# Patient Record
Sex: FEMALE | Race: BLACK OR AFRICAN AMERICAN | Employment: OTHER | URBAN - METROPOLITAN AREA
[De-identification: names, ages, dates, MRNs, and addresses within clinical notes are randomized per-mention and may not be internally consistent; named-entity substitution may affect disease eponyms.]

---

## 2017-01-18 RX ORDER — FENTANYL 25 UG/H
1 PATCH TRANSDERMAL
Qty: 10 PATCH | Refills: 0 | Status: SHIPPED | OUTPATIENT
Start: 2017-01-18 | End: 2017-03-03 | Stop reason: SDUPTHER

## 2017-01-30 ENCOUNTER — HOSPITAL ENCOUNTER (OUTPATIENT)
Dept: ENDOSCOPY | Age: 82
Discharge: HOME | End: 2017-01-30
Attending: INTERNAL MEDICINE | Admitting: INTERNAL MEDICINE

## 2017-01-30 VITALS
RESPIRATION RATE: 18 BRPM | SYSTOLIC BLOOD PRESSURE: 171 MMHG | OXYGEN SATURATION: 98 % | TEMPERATURE: 97.3 F | HEART RATE: 99 BPM | DIASTOLIC BLOOD PRESSURE: 63 MMHG | HEIGHT: 66 IN

## 2017-01-30 LAB
GLUCOSE BLD-MCNC: 71 MG/DL (ref 70–99)
PERFORMED ON: NORMAL

## 2017-01-30 RX ORDER — MULTIVIT WITH MINERALS/LUTEIN
500 TABLET ORAL DAILY
COMMUNITY

## 2017-01-30 RX ORDER — UBIDECARENONE 75 MG
50 CAPSULE ORAL DAILY
COMMUNITY

## 2017-01-30 RX ORDER — ACETAMINOPHEN 160 MG
1000 TABLET,DISINTEGRATING ORAL DAILY
COMMUNITY

## 2017-01-30 RX ORDER — OMEPRAZOLE 20 MG/1
20 CAPSULE, DELAYED RELEASE ORAL DAILY
Status: ON HOLD | COMMUNITY
End: 2020-07-14 | Stop reason: HOSPADM

## 2017-03-03 RX ORDER — FENTANYL 25 UG/H
1 PATCH TRANSDERMAL
Qty: 10 PATCH | Refills: 0 | Status: SHIPPED | OUTPATIENT
Start: 2017-03-03 | End: 2017-04-11 | Stop reason: SDUPTHER

## 2017-04-11 RX ORDER — FENTANYL 25 UG/H
1 PATCH TRANSDERMAL
Qty: 10 PATCH | Refills: 0 | Status: SHIPPED | OUTPATIENT
Start: 2017-04-11 | End: 2017-05-18 | Stop reason: SDUPTHER

## 2017-05-19 RX ORDER — FENTANYL 25 UG/H
1 PATCH TRANSDERMAL
Qty: 10 PATCH | Refills: 0 | Status: SHIPPED | OUTPATIENT
Start: 2017-05-19 | End: 2017-06-18

## 2017-06-29 ENCOUNTER — TELEPHONE (OUTPATIENT)
Dept: ORTHOPEDIC SURGERY | Age: 82
End: 2017-06-29

## 2017-07-11 ENCOUNTER — OFFICE VISIT (OUTPATIENT)
Dept: ORTHOPEDIC SURGERY | Age: 82
End: 2017-07-11

## 2017-07-11 VITALS — BODY MASS INDEX: 25.39 KG/M2 | TEMPERATURE: 97.9 F | WEIGHT: 158 LBS | HEIGHT: 66 IN

## 2017-07-11 DIAGNOSIS — Z96.651 STATUS POST TOTAL RIGHT KNEE REPLACEMENT: ICD-10-CM

## 2017-07-11 DIAGNOSIS — S82.001A CLOSED DISPLACED FRACTURE OF RIGHT PATELLA, UNSPECIFIED FRACTURE MORPHOLOGY, INITIAL ENCOUNTER: Primary | ICD-10-CM

## 2017-07-11 PROCEDURE — 99202 OFFICE O/P NEW SF 15 MIN: CPT | Performed by: PHYSICIAN ASSISTANT

## 2017-07-26 ENCOUNTER — OFFICE VISIT (OUTPATIENT)
Dept: ORTHOPEDIC SURGERY | Age: 82
End: 2017-07-26

## 2017-07-26 VITALS — BODY MASS INDEX: 25.39 KG/M2 | WEIGHT: 158 LBS | HEIGHT: 66 IN

## 2017-07-26 DIAGNOSIS — S82.001A CLOSED DISPLACED FRACTURE OF RIGHT PATELLA, UNSPECIFIED FRACTURE MORPHOLOGY, INITIAL ENCOUNTER: Primary | ICD-10-CM

## 2017-07-26 PROCEDURE — 99212 OFFICE O/P EST SF 10 MIN: CPT | Performed by: PHYSICIAN ASSISTANT

## 2017-07-27 ENCOUNTER — TELEPHONE (OUTPATIENT)
Dept: ORTHOPEDIC SURGERY | Age: 82
End: 2017-07-27

## 2017-08-01 RX ORDER — FENTANYL 25 UG/H
1 PATCH TRANSDERMAL
Qty: 10 PATCH | Refills: 0 | Status: SHIPPED | OUTPATIENT
Start: 2017-08-01 | End: 2017-10-20 | Stop reason: SDUPTHER

## 2017-08-15 ENCOUNTER — OFFICE VISIT (OUTPATIENT)
Dept: INTERNAL MEDICINE CLINIC | Age: 82
End: 2017-08-15

## 2017-08-15 DIAGNOSIS — F03.90 DEMENTIA WITHOUT BEHAVIORAL DISTURBANCE, UNSPECIFIED DEMENTIA TYPE: ICD-10-CM

## 2017-08-15 DIAGNOSIS — K92.2 GASTROINTESTINAL HEMORRHAGE, UNSPECIFIED GASTROINTESTINAL HEMORRHAGE TYPE: Primary | ICD-10-CM

## 2017-08-15 DIAGNOSIS — E11.9 TYPE 2 DIABETES MELLITUS WITHOUT COMPLICATION, WITHOUT LONG-TERM CURRENT USE OF INSULIN (HCC): ICD-10-CM

## 2017-08-15 DIAGNOSIS — I10 ESSENTIAL HYPERTENSION: ICD-10-CM

## 2017-08-15 PROCEDURE — 99309 SBSQ NF CARE MODERATE MDM 30: CPT | Performed by: INTERNAL MEDICINE

## 2017-08-21 ASSESSMENT — ENCOUNTER SYMPTOMS
RESPIRATORY NEGATIVE: 1
EYES NEGATIVE: 1
ALLERGIC/IMMUNOLOGIC NEGATIVE: 1
GASTROINTESTINAL NEGATIVE: 1

## 2017-10-02 ENCOUNTER — TELEPHONE (OUTPATIENT)
Dept: ORTHOPEDIC SURGERY | Age: 82
End: 2017-10-02

## 2017-10-02 NOTE — TELEPHONE ENCOUNTER
Chinmay is calling (nurse) to see if patient can be discharge from the brace.   Please call Chinmay at [phone]

## 2017-10-10 ENCOUNTER — OFFICE VISIT (OUTPATIENT)
Dept: INTERNAL MEDICINE CLINIC | Age: 82
End: 2017-10-10

## 2017-10-10 DIAGNOSIS — K92.2 GASTROINTESTINAL HEMORRHAGE, UNSPECIFIED GASTROINTESTINAL HEMORRHAGE TYPE: Primary | ICD-10-CM

## 2017-10-10 DIAGNOSIS — F03.90 DEMENTIA WITHOUT BEHAVIORAL DISTURBANCE, UNSPECIFIED DEMENTIA TYPE: ICD-10-CM

## 2017-10-10 DIAGNOSIS — E11.9 TYPE 2 DIABETES MELLITUS WITHOUT COMPLICATION, WITHOUT LONG-TERM CURRENT USE OF INSULIN (HCC): ICD-10-CM

## 2017-10-10 DIAGNOSIS — I10 ESSENTIAL HYPERTENSION: ICD-10-CM

## 2017-10-10 PROCEDURE — 99309 SBSQ NF CARE MODERATE MDM 30: CPT | Performed by: INTERNAL MEDICINE

## 2017-10-20 RX ORDER — FENTANYL 25 UG/H
1 PATCH TRANSDERMAL
Qty: 10 PATCH | Refills: 0 | Status: SHIPPED | OUTPATIENT
Start: 2017-10-20 | End: 2017-11-01 | Stop reason: SDUPTHER

## 2017-10-23 ASSESSMENT — ENCOUNTER SYMPTOMS
EYES NEGATIVE: 1
RESPIRATORY NEGATIVE: 1
ALLERGIC/IMMUNOLOGIC NEGATIVE: 1
GASTROINTESTINAL NEGATIVE: 1

## 2017-10-23 NOTE — PROGRESS NOTES
supple. Cardiovascular: Normal rate, regular rhythm, normal heart sounds and intact distal pulses. Pulmonary/Chest: Effort normal and breath sounds normal.   Abdominal: Soft. Bowel sounds are normal.   Musculoskeletal: Normal range of motion. Neurological: She is alert and oriented to person, place, and time. Skin: Skin is warm and dry. Psychiatric: She has a normal mood and affect. Assessment:            Plan:      Marc Barrera was seen today for 3 month follow-up. Diagnoses and all orders for this visit:    Gastrointestinal hemorrhage, unspecified gastrointestinal hemorrhage type - Resolved    Essential hypertension - Controlled well with current medications    Dementia without behavioral disturbance, unspecified dementia type - Stable    Type 2 diabetes mellitus without complication, without long-term current use of insulin ([address] - Controlled well with current medical regimen.

## 2017-10-30 ENCOUNTER — HOSPITAL ENCOUNTER (OUTPATIENT)
Dept: CT IMAGING | Age: 82
Discharge: HOME | End: 2017-10-30
Attending: INTERNAL MEDICINE | Admitting: INTERNAL MEDICINE

## 2017-10-30 DIAGNOSIS — R26.2 DIFFICULTY IN WALKING, NOT ELSEWHERE CLASSIFIED: ICD-10-CM

## 2017-10-30 DIAGNOSIS — R26.2 DIFFICULTY WALKING: ICD-10-CM

## 2017-11-01 RX ORDER — FENTANYL 25 UG/H
1 PATCH TRANSDERMAL
Qty: 10 PATCH | Refills: 0 | Status: SHIPPED | OUTPATIENT
Start: 2017-11-01

## 2018-02-28 ENCOUNTER — HOSPITAL ENCOUNTER (OUTPATIENT)
Dept: ENDOSCOPY | Age: 83
Discharge: HOME | End: 2018-02-28
Attending: INTERNAL MEDICINE | Admitting: INTERNAL MEDICINE

## 2018-02-28 VITALS
HEIGHT: 66 IN | HEART RATE: 60 BPM | DIASTOLIC BLOOD PRESSURE: 60 MMHG | RESPIRATION RATE: 16 BRPM | OXYGEN SATURATION: 100 % | TEMPERATURE: 96.3 F | WEIGHT: 163 LBS | SYSTOLIC BLOOD PRESSURE: 155 MMHG | BODY MASS INDEX: 26.2 KG/M2

## 2018-02-28 LAB
GLUCOSE BLD-MCNC: 87 MG/DL (ref 70–99)
PERFORMED ON: NORMAL

## 2018-02-28 ASSESSMENT — PAIN - FUNCTIONAL ASSESSMENT: PAIN_FUNCTIONAL_ASSESSMENT: ADVANCED DEMENTIA

## 2018-02-28 NOTE — BRIEF OP NOTE
Brief Postoperative Note    Shahriar Russell  YOB: 1933  [id number]    Pre-operative Diagnosis:  polyp    Post-operative Diagnosis: Same    Procedure: colon bx    Anesthesia: Moderate Sedation    Surgeons/Assistants: yenny    Estimated Blood Loss: less than 50     Complications: None    Specimens: Was Obtained:     Findings: polyp    Electronically signed by Radu Barraza MD on 2/28/2018 at 11:35 AM

## 2018-02-28 NOTE — PROGRESS NOTES
.History and Physical / Pre-Sedation Assessment    Patient:  Anand Bangura   :   1933     Intended Procedure:  colon  HPI: polyp    Nurses notes reviewed and agreed. Medications reviewed  Allergies: No Known Allergies    Physical Exam:  Vital Signs: BP (!) 155/60   Pulse 60   Temp 96.3 °F (35.7 °C) (Axillary)   Resp 16   Ht 5' 6\" (1.676 m)   Wt 163 lb (73.9 kg)   SpO2 100%   BMI 26.31 kg/m²    Airway: Mallampati: I (soft palate, uvula, fauces, tonsillar pillars visible)  Pulmonary:Normal  Cardiac:Normal  Abdomen:Normal    Pre-Procedure Assessment / Plan:  ASA: Class 2 - A normal healthy patient with mild systemic disease  Level of Sedation Plan: Moderate sedation  Post Procedure plan: Return to same level of care    I assessed the patient and find that the patient is in satisfactory condition to proceed with the planned procedure and sedation plan. I have explained the risk, benefits, and alternatives to the procedure; the patient understands and agrees to proceed.        Josie Steele  2018

## 2018-02-28 NOTE — PLAN OF CARE
ADMISSION PRE-PROCEDURE INTRA-PROCEDURE POST-PROCEDURE: RECOVERY/ DISCHARGE   ASSESSMENT &  EVALUATION CONSULTS [x] Verify patient identification, allergies, vital signs, NPO, IV, & SPO2  [x] Complete the EMILY SCORE  [x] Consent form to treat signed  [x] History and Physical [x] Reassess patient after pre- procedure medication given  [x] GI physician evaluates pt  [x] Verify patient's name, allergies [x] Continuous monitoring of vital  signs, SPO2, LOC  [x] Emotional status  [x] Patient comfort level [x] Total system admission assessment with appropriate intervention  [x] Pain evaluation and management  [x] Discharge criteria met  [x] Discharge assessment with appropriate intervention  [x] Compare with pre-procedure status  [x] Discharge by appropriate physician   DIAGNOSTIC / TESTS [x]  Lab work ordered  [x]  Obtain and attach lab work to patient's chart  [x]  Report abnormals and F/U with physician [x] Assure needed test results are present [x] Diagnostic testing as indicated  [x] Obtained specimens sent to lab [x] Diagnostic testing as indicated     MEDICATIONS [x]  Conscious sedation medications  explained to patient  [x]  Start IV per physician's order  and/or protocol  [x]  Verify compliance of the colon prep  []  Pre-procedure med. as ordered  [x] Verify compliance of colon prep. [x] Re-check IV access [x] Assist with administration of IV conscious sedation medication  [x] Start O2  per  nasal cannula, if needed   [x] IV fluids as indicated/ordered  [x] Administration of medications as ordered  [x] Medications as prescribed  [x] D/C O2 therapy as ordered   PROCEDURE/TREATMENT [x] Specific order by GI physician  [x] Specific procedure as scheduled  [x]  Verify procedure as ordered [x] Time out/procedure verification checklist complete.  [x] EGD/Colonoscopy and related procedures  [x] Assist physician with the procedure [x] Treatment as indicated   NUTRITION / DIET [x] NPO after midnight, as ordered [x] orientation   -  tolerate fluid with no nausea and vomiting  -  ambulate as pre-procedure ADL   - verbalize understanding of the discharge instructions     Amanda Patient  11:58 AM

## 2018-03-01 NOTE — PROCEDURES
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       Monica Jacobsen MD    D: 02/28/2018 12:22:03       T: 02/28/2018 14:12:24     [email or web address]  Job#: [id number]     Doc#: [id number]    CC:  Adela Palmer MD

## 2018-07-13 PROBLEM — K59.00 CONSTIPATION: Status: ACTIVE | Noted: 2018-07-13

## 2020-07-05 ENCOUNTER — HOSPITAL ENCOUNTER (INPATIENT)
Age: 85
LOS: 10 days | Discharge: OTHER INSTITUTION | DRG: 871 | End: 2020-07-15
Attending: EMERGENCY MEDICINE | Admitting: INTERNAL MEDICINE
Payer: MEDICARE

## 2020-07-05 ENCOUNTER — APPOINTMENT (OUTPATIENT)
Dept: GENERAL RADIOLOGY | Age: 85
DRG: 871 | End: 2020-07-05
Payer: MEDICARE

## 2020-07-05 PROBLEM — N17.9 ACUTE KIDNEY INJURY (HCC): Status: ACTIVE | Noted: 2020-07-05

## 2020-07-05 LAB
A/G RATIO: 0.7 (ref 1.1–2.2)
ALBUMIN SERPL-MCNC: 3.8 G/DL (ref 3.4–5)
ALP BLD-CCNC: 89 U/L (ref 40–129)
ALT SERPL-CCNC: 18 U/L (ref 10–40)
AMMONIA: 28 UMOL/L (ref 11–51)
ANION GAP SERPL CALCULATED.3IONS-SCNC: 26 MMOL/L (ref 3–16)
AST SERPL-CCNC: 52 U/L (ref 15–37)
BASE EXCESS VENOUS: -8.3 MMOL/L (ref -3–3)
BASOPHILS ABSOLUTE: 0.1 K/UL (ref 0–0.2)
BASOPHILS RELATIVE PERCENT: 0.5 %
BETA-HYDROXYBUTYRATE: 2 MMOL/L (ref 0–0.27)
BILIRUB SERPL-MCNC: 0.3 MG/DL (ref 0–1)
BUN BLDV-MCNC: 190 MG/DL (ref 7–20)
CALCIUM SERPL-MCNC: 10.6 MG/DL (ref 8.3–10.6)
CARBOXYHEMOGLOBIN: 3.1 % (ref 0–1.5)
CHLORIDE BLD-SCNC: 107 MMOL/L (ref 99–110)
CO2: 17 MMOL/L (ref 21–32)
CREAT SERPL-MCNC: 5 MG/DL (ref 0.6–1.2)
EOSINOPHILS ABSOLUTE: 0 K/UL (ref 0–0.6)
EOSINOPHILS RELATIVE PERCENT: 0 %
GFR AFRICAN AMERICAN: 10
GFR NON-AFRICAN AMERICAN: 8
GLOBULIN: 5.7 G/DL
GLUCOSE BLD-MCNC: 274 MG/DL (ref 70–99)
HCO3 VENOUS: 16.9 MMOL/L (ref 23–29)
HCT VFR BLD CALC: 38.9 % (ref 36–48)
HEMOGLOBIN: 12 G/DL (ref 12–16)
INR BLD: 1.14 (ref 0.86–1.14)
LACTATE DEHYDROGENASE: 552 U/L (ref 100–190)
LACTIC ACID, SEPSIS: 3.3 MMOL/L (ref 0.4–1.9)
LYMPHOCYTES ABSOLUTE: 0.6 K/UL (ref 1–5.1)
LYMPHOCYTES RELATIVE PERCENT: 2.3 %
MCH RBC QN AUTO: 28.6 PG (ref 26–34)
MCHC RBC AUTO-ENTMCNC: 30.9 G/DL (ref 31–36)
MCV RBC AUTO: 92.6 FL (ref 80–100)
METHEMOGLOBIN VENOUS: <0 %
MONOCYTES ABSOLUTE: 0.5 K/UL (ref 0–1.3)
MONOCYTES RELATIVE PERCENT: 2.1 %
NEUTROPHILS ABSOLUTE: 24.8 K/UL (ref 1.7–7.7)
NEUTROPHILS RELATIVE PERCENT: 95.1 %
O2 CONTENT, VEN: 17 VOL %
O2 SAT, VEN: 98 %
O2 THERAPY: ABNORMAL
PCO2, VEN: 33.2 MMHG (ref 40–50)
PDW BLD-RTO: 17.5 % (ref 12.4–15.4)
PH VENOUS: 7.32 (ref 7.35–7.45)
PLATELET # BLD: 407 K/UL (ref 135–450)
PMV BLD AUTO: 9.8 FL (ref 5–10.5)
PO2, VEN: 104 MMHG (ref 25–40)
POTASSIUM REFLEX MAGNESIUM: 5.4 MMOL/L (ref 3.5–5.1)
PRO-BNP: 5257 PG/ML (ref 0–449)
PROCALCITONIN: 0.85 NG/ML (ref 0–0.15)
PROTHROMBIN TIME: 13.3 SEC (ref 10–13.2)
RBC # BLD: 4.21 M/UL (ref 4–5.2)
SALICYLATE, SERUM: <0.3 MG/DL (ref 15–30)
SODIUM BLD-SCNC: 150 MMOL/L (ref 136–145)
TCO2 CALC VENOUS: 40 MMOL/L
TOTAL PROTEIN: 9.5 G/DL (ref 6.4–8.2)
TROPONIN: 0.34 NG/ML
WBC # BLD: 26.1 K/UL (ref 4–11)

## 2020-07-05 PROCEDURE — 83930 ASSAY OF BLOOD OSMOLALITY: CPT

## 2020-07-05 PROCEDURE — G0480 DRUG TEST DEF 1-7 CLASSES: HCPCS

## 2020-07-05 PROCEDURE — 87040 BLOOD CULTURE FOR BACTERIA: CPT

## 2020-07-05 PROCEDURE — 85025 COMPLETE CBC W/AUTO DIFF WBC: CPT

## 2020-07-05 PROCEDURE — 96374 THER/PROPH/DIAG INJ IV PUSH: CPT

## 2020-07-05 PROCEDURE — 93005 ELECTROCARDIOGRAM TRACING: CPT | Performed by: EMERGENCY MEDICINE

## 2020-07-05 PROCEDURE — 82010 KETONE BODYS QUAN: CPT

## 2020-07-05 PROCEDURE — 83605 ASSAY OF LACTIC ACID: CPT

## 2020-07-05 PROCEDURE — 83615 LACTATE (LD) (LDH) ENZYME: CPT

## 2020-07-05 PROCEDURE — 82140 ASSAY OF AMMONIA: CPT

## 2020-07-05 PROCEDURE — 71045 X-RAY EXAM CHEST 1 VIEW: CPT

## 2020-07-05 PROCEDURE — 84145 PROCALCITONIN (PCT): CPT

## 2020-07-05 PROCEDURE — 36415 COLL VENOUS BLD VENIPUNCTURE: CPT

## 2020-07-05 PROCEDURE — 85610 PROTHROMBIN TIME: CPT

## 2020-07-05 PROCEDURE — 6360000002 HC RX W HCPCS

## 2020-07-05 PROCEDURE — 0DB78ZX EXCISION OF STOMACH, PYLORUS, VIA NATURAL OR ARTIFICIAL OPENING ENDOSCOPIC, DIAGNOSTIC: ICD-10-PCS | Performed by: INTERNAL MEDICINE

## 2020-07-05 PROCEDURE — 99285 EMERGENCY DEPT VISIT HI MDM: CPT

## 2020-07-05 PROCEDURE — 2060000000 HC ICU INTERMEDIATE R&B

## 2020-07-05 PROCEDURE — 0DB68ZX EXCISION OF STOMACH, VIA NATURAL OR ARTIFICIAL OPENING ENDOSCOPIC, DIAGNOSTIC: ICD-10-PCS | Performed by: INTERNAL MEDICINE

## 2020-07-05 PROCEDURE — U0003 INFECTIOUS AGENT DETECTION BY NUCLEIC ACID (DNA OR RNA); SEVERE ACUTE RESPIRATORY SYNDROME CORONAVIRUS 2 (SARS-COV-2) (CORONAVIRUS DISEASE [COVID-19]), AMPLIFIED PROBE TECHNIQUE, MAKING USE OF HIGH THROUGHPUT TECHNOLOGIES AS DESCRIBED BY CMS-2020-01-R: HCPCS

## 2020-07-05 PROCEDURE — 80053 COMPREHEN METABOLIC PANEL: CPT

## 2020-07-05 PROCEDURE — 83880 ASSAY OF NATRIURETIC PEPTIDE: CPT

## 2020-07-05 PROCEDURE — 82803 BLOOD GASES ANY COMBINATION: CPT

## 2020-07-05 PROCEDURE — 84484 ASSAY OF TROPONIN QUANT: CPT

## 2020-07-05 RX ORDER — NALOXONE HYDROCHLORIDE 1 MG/ML
INJECTION INTRAMUSCULAR; INTRAVENOUS; SUBCUTANEOUS
Status: COMPLETED
Start: 2020-07-05 | End: 2020-07-05

## 2020-07-05 RX ORDER — SODIUM CHLORIDE 450 MG/100ML
INJECTION, SOLUTION INTRAVENOUS CONTINUOUS
Status: DISCONTINUED | OUTPATIENT
Start: 2020-07-05 | End: 2020-07-06

## 2020-07-05 RX ORDER — 0.9 % SODIUM CHLORIDE 0.9 %
1000 INTRAVENOUS SOLUTION INTRAVENOUS ONCE
Status: COMPLETED | OUTPATIENT
Start: 2020-07-05 | End: 2020-07-06

## 2020-07-05 RX ORDER — ASPIRIN 81 MG/1
324 TABLET, CHEWABLE ORAL ONCE
Status: DISCONTINUED | OUTPATIENT
Start: 2020-07-05 | End: 2020-07-15 | Stop reason: HOSPADM

## 2020-07-05 RX ORDER — NALOXONE HYDROCHLORIDE 1 MG/ML
2 INJECTION INTRAMUSCULAR; INTRAVENOUS; SUBCUTANEOUS ONCE
Status: COMPLETED | OUTPATIENT
Start: 2020-07-05 | End: 2020-07-05

## 2020-07-05 RX ORDER — LINEZOLID 2 MG/ML
600 INJECTION, SOLUTION INTRAVENOUS ONCE
Status: COMPLETED | OUTPATIENT
Start: 2020-07-05 | End: 2020-07-06

## 2020-07-05 RX ADMIN — NALOXONE HYDROCHLORIDE 2 MG: 1 INJECTION PARENTERAL at 21:35

## 2020-07-05 RX ADMIN — NALOXONE HYDROCHLORIDE 2 MG: 1 INJECTION INTRAMUSCULAR; INTRAVENOUS; SUBCUTANEOUS at 21:35

## 2020-07-06 PROBLEM — E10.10 DKA, TYPE 1, NOT AT GOAL (HCC): Status: ACTIVE | Noted: 2020-07-06

## 2020-07-06 LAB
A/G RATIO: 0.7 (ref 1.1–2.2)
ALBUMIN SERPL-MCNC: 3 G/DL (ref 3.4–5)
ALP BLD-CCNC: 122 U/L (ref 40–129)
ALT SERPL-CCNC: 18 U/L (ref 10–40)
ANION GAP SERPL CALCULATED.3IONS-SCNC: 21 MMOL/L (ref 3–16)
ANION GAP SERPL CALCULATED.3IONS-SCNC: 22 MMOL/L (ref 3–16)
ANION GAP SERPL CALCULATED.3IONS-SCNC: 25 MMOL/L (ref 3–16)
ANION GAP SERPL CALCULATED.3IONS-SCNC: 26 MMOL/L (ref 3–16)
ANION GAP SERPL CALCULATED.3IONS-SCNC: 30 MMOL/L (ref 3–16)
AST SERPL-CCNC: 46 U/L (ref 15–37)
BASOPHILS ABSOLUTE: 0.1 K/UL (ref 0–0.2)
BASOPHILS RELATIVE PERCENT: 0.3 %
BETA-HYDROXYBUTYRATE: 1 MMOL/L (ref 0–0.27)
BILIRUB SERPL-MCNC: 0.4 MG/DL (ref 0–1)
BILIRUBIN URINE: NEGATIVE
BLOOD, URINE: ABNORMAL
BUN BLDV-MCNC: 162 MG/DL (ref 7–20)
BUN BLDV-MCNC: 173 MG/DL (ref 7–20)
BUN BLDV-MCNC: 186 MG/DL (ref 7–20)
BUN BLDV-MCNC: 188 MG/DL (ref 7–20)
BUN BLDV-MCNC: 200 MG/DL (ref 7–20)
CALCIUM SERPL-MCNC: 7.9 MG/DL (ref 8.3–10.6)
CALCIUM SERPL-MCNC: 9.3 MG/DL (ref 8.3–10.6)
CALCIUM SERPL-MCNC: 9.3 MG/DL (ref 8.3–10.6)
CALCIUM SERPL-MCNC: 9.6 MG/DL (ref 8.3–10.6)
CALCIUM SERPL-MCNC: 9.6 MG/DL (ref 8.3–10.6)
CHLORIDE BLD-SCNC: 100 MMOL/L (ref 99–110)
CHLORIDE BLD-SCNC: 112 MMOL/L (ref 99–110)
CHLORIDE BLD-SCNC: 113 MMOL/L (ref 99–110)
CHLORIDE BLD-SCNC: 114 MMOL/L (ref 99–110)
CHLORIDE BLD-SCNC: 115 MMOL/L (ref 99–110)
CHOLESTEROL, TOTAL: 113 MG/DL (ref 0–199)
CLARITY: ABNORMAL
CO2: 13 MMOL/L (ref 21–32)
CO2: 15 MMOL/L (ref 21–32)
CO2: 16 MMOL/L (ref 21–32)
CO2: 16 MMOL/L (ref 21–32)
CO2: 18 MMOL/L (ref 21–32)
COLOR: YELLOW
CREAT SERPL-MCNC: 4.3 MG/DL (ref 0.6–1.2)
CREAT SERPL-MCNC: 4.5 MG/DL (ref 0.6–1.2)
CREAT SERPL-MCNC: 4.8 MG/DL (ref 0.6–1.2)
CREAT SERPL-MCNC: 5 MG/DL (ref 0.6–1.2)
CREAT SERPL-MCNC: 5 MG/DL (ref 0.6–1.2)
CREATININE URINE: 53.7 MG/DL (ref 28–259)
EKG ATRIAL RATE: 126 BPM
EKG DIAGNOSIS: NORMAL
EKG P AXIS: -9 DEGREES
EKG P-R INTERVAL: 134 MS
EKG Q-T INTERVAL: 340 MS
EKG QRS DURATION: 104 MS
EKG QTC CALCULATION (BAZETT): 492 MS
EKG R AXIS: 8 DEGREES
EKG T AXIS: 168 DEGREES
EKG VENTRICULAR RATE: 126 BPM
EOSINOPHILS ABSOLUTE: 0 K/UL (ref 0–0.6)
EOSINOPHILS RELATIVE PERCENT: 0 %
ESTIMATED AVERAGE GLUCOSE: 157.1 MG/DL
GFR AFRICAN AMERICAN: 10
GFR AFRICAN AMERICAN: 11
GFR AFRICAN AMERICAN: 12
GFR NON-AFRICAN AMERICAN: 10
GFR NON-AFRICAN AMERICAN: 8
GFR NON-AFRICAN AMERICAN: 8
GFR NON-AFRICAN AMERICAN: 9
GFR NON-AFRICAN AMERICAN: 9
GLOBULIN: 4.5 G/DL
GLUCOSE BLD-MCNC: 161 MG/DL (ref 70–99)
GLUCOSE BLD-MCNC: 187 MG/DL (ref 70–99)
GLUCOSE BLD-MCNC: 195 MG/DL (ref 70–99)
GLUCOSE BLD-MCNC: 202 MG/DL (ref 70–99)
GLUCOSE BLD-MCNC: 206 MG/DL (ref 70–99)
GLUCOSE BLD-MCNC: 208 MG/DL (ref 70–99)
GLUCOSE BLD-MCNC: 210 MG/DL (ref 70–99)
GLUCOSE BLD-MCNC: 212 MG/DL (ref 70–99)
GLUCOSE BLD-MCNC: 216 MG/DL (ref 70–99)
GLUCOSE BLD-MCNC: 218 MG/DL (ref 70–99)
GLUCOSE BLD-MCNC: 348 MG/DL (ref 70–99)
GLUCOSE BLD-MCNC: 384 MG/DL (ref 70–99)
GLUCOSE BLD-MCNC: 404 MG/DL (ref 70–99)
GLUCOSE BLD-MCNC: 427 MG/DL (ref 70–99)
GLUCOSE BLD-MCNC: 626 MG/DL (ref 70–99)
GLUCOSE URINE: NEGATIVE MG/DL
HBA1C MFR BLD: 7.1 %
HCT VFR BLD CALC: 37.2 % (ref 36–48)
HCT VFR BLD CALC: 39.5 % (ref 36–48)
HDLC SERPL-MCNC: 20 MG/DL (ref 40–60)
HEMOGLOBIN: 11.5 G/DL (ref 12–16)
HEMOGLOBIN: 11.5 G/DL (ref 12–16)
KETONES, URINE: NEGATIVE MG/DL
LACTIC ACID, SEPSIS: 3 MMOL/L (ref 0.4–1.9)
LACTIC ACID: 2.3 MMOL/L (ref 0.4–2)
LACTIC ACID: 3 MMOL/L (ref 0.4–2)
LACTIC ACID: 3 MMOL/L (ref 0.4–2)
LDL CHOLESTEROL CALCULATED: 49 MG/DL
LEUKOCYTE ESTERASE, URINE: ABNORMAL
LYMPHOCYTES ABSOLUTE: 0.7 K/UL (ref 1–5.1)
LYMPHOCYTES RELATIVE PERCENT: 2.4 %
MAGNESIUM: 2.4 MG/DL (ref 1.8–2.4)
MAGNESIUM: 2.4 MG/DL (ref 1.8–2.4)
MAGNESIUM: 2.6 MG/DL (ref 1.8–2.4)
MCH RBC QN AUTO: 29.2 PG (ref 26–34)
MCH RBC QN AUTO: 29.6 PG (ref 26–34)
MCHC RBC AUTO-ENTMCNC: 29.3 G/DL (ref 31–36)
MCHC RBC AUTO-ENTMCNC: 30.8 G/DL (ref 31–36)
MCV RBC AUTO: 96.1 FL (ref 80–100)
MCV RBC AUTO: 99.8 FL (ref 80–100)
MICROSCOPIC EXAMINATION: YES
MONOCYTES ABSOLUTE: 0.9 K/UL (ref 0–1.3)
MONOCYTES RELATIVE PERCENT: 3 %
NEUTROPHILS ABSOLUTE: 28.9 K/UL (ref 1.7–7.7)
NEUTROPHILS RELATIVE PERCENT: 94.3 %
NITRITE, URINE: NEGATIVE
OCCULT BLOOD DIAGNOSTIC: ABNORMAL
OSMOLALITY URINE: 440 MOSM/KG (ref 390–1070)
OSMOLALITY: 404 MOSM/KG (ref 280–301)
PDW BLD-RTO: 17.6 % (ref 12.4–15.4)
PDW BLD-RTO: 18.2 % (ref 12.4–15.4)
PERFORMED ON: ABNORMAL
PH UA: 5.5 (ref 5–8)
PHOSPHORUS: 5.1 MG/DL (ref 2.5–4.9)
PHOSPHORUS: 5.7 MG/DL (ref 2.5–4.9)
PHOSPHORUS: 6.4 MG/DL (ref 2.5–4.9)
PLATELET # BLD: 273 K/UL (ref 135–450)
PLATELET # BLD: 278 K/UL (ref 135–450)
PMV BLD AUTO: 10.3 FL (ref 5–10.5)
PMV BLD AUTO: 9.6 FL (ref 5–10.5)
POTASSIUM SERPL-SCNC: 2.9 MMOL/L (ref 3.5–5.1)
POTASSIUM SERPL-SCNC: 4.1 MMOL/L (ref 3.5–5.1)
POTASSIUM SERPL-SCNC: 4.1 MMOL/L (ref 3.5–5.1)
POTASSIUM SERPL-SCNC: 4.4 MMOL/L (ref 3.5–5.1)
POTASSIUM SERPL-SCNC: 4.8 MMOL/L (ref 3.5–5.1)
PROTEIN UA: 30 MG/DL
RBC # BLD: 3.87 M/UL (ref 4–5.2)
RBC # BLD: 3.96 M/UL (ref 4–5.2)
RBC UA: ABNORMAL /HPF (ref 0–4)
SARS-COV-2, PCR: NOT DETECTED
SODIUM BLD-SCNC: 143 MMOL/L (ref 136–145)
SODIUM BLD-SCNC: 152 MMOL/L (ref 136–145)
SODIUM BLD-SCNC: 153 MMOL/L (ref 136–145)
SODIUM BLD-SCNC: 154 MMOL/L (ref 136–145)
SODIUM BLD-SCNC: 154 MMOL/L (ref 136–145)
SODIUM URINE: 34 MMOL/L
SPECIFIC GRAVITY UA: 1.01 (ref 1–1.03)
TOTAL CK: 1552 U/L (ref 26–192)
TOTAL PROTEIN: 7.5 G/DL (ref 6.4–8.2)
TRIGL SERPL-MCNC: 218 MG/DL (ref 0–150)
TROPONIN: 0.22 NG/ML
TROPONIN: 0.22 NG/ML
TROPONIN: 0.36 NG/ML
URINE REFLEX TO CULTURE: YES
URINE TYPE: ABNORMAL
UROBILINOGEN, URINE: 0.2 E.U./DL
VLDLC SERPL CALC-MCNC: 44 MG/DL
WBC # BLD: 30.6 K/UL (ref 4–11)
WBC # BLD: 34.5 K/UL (ref 4–11)
WBC UA: >100 /HPF (ref 0–5)

## 2020-07-06 PROCEDURE — 2580000003 HC RX 258: Performed by: HOSPITALIST

## 2020-07-06 PROCEDURE — 87081 CULTURE SCREEN ONLY: CPT

## 2020-07-06 PROCEDURE — 83036 HEMOGLOBIN GLYCOSYLATED A1C: CPT

## 2020-07-06 PROCEDURE — 36569 INSJ PICC 5 YR+ W/O IMAGING: CPT

## 2020-07-06 PROCEDURE — 87186 SC STD MICRODIL/AGAR DIL: CPT

## 2020-07-06 PROCEDURE — 6360000002 HC RX W HCPCS: Performed by: INTERNAL MEDICINE

## 2020-07-06 PROCEDURE — 6370000000 HC RX 637 (ALT 250 FOR IP): Performed by: INTERNAL MEDICINE

## 2020-07-06 PROCEDURE — 36415 COLL VENOUS BLD VENIPUNCTURE: CPT

## 2020-07-06 PROCEDURE — 80061 LIPID PANEL: CPT

## 2020-07-06 PROCEDURE — 99291 CRITICAL CARE FIRST HOUR: CPT | Performed by: INTERNAL MEDICINE

## 2020-07-06 PROCEDURE — 83935 ASSAY OF URINE OSMOLALITY: CPT

## 2020-07-06 PROCEDURE — 2580000003 HC RX 258: Performed by: INTERNAL MEDICINE

## 2020-07-06 PROCEDURE — G0328 FECAL BLOOD SCRN IMMUNOASSAY: HCPCS

## 2020-07-06 PROCEDURE — 84300 ASSAY OF URINE SODIUM: CPT

## 2020-07-06 PROCEDURE — 81001 URINALYSIS AUTO W/SCOPE: CPT

## 2020-07-06 PROCEDURE — 82010 KETONE BODYS QUAN: CPT

## 2020-07-06 PROCEDURE — 80053 COMPREHEN METABOLIC PANEL: CPT

## 2020-07-06 PROCEDURE — 6370000000 HC RX 637 (ALT 250 FOR IP): Performed by: HOSPITALIST

## 2020-07-06 PROCEDURE — 02HV33Z INSERTION OF INFUSION DEVICE INTO SUPERIOR VENA CAVA, PERCUTANEOUS APPROACH: ICD-10-PCS | Performed by: HOSPITALIST

## 2020-07-06 PROCEDURE — 82570 ASSAY OF URINE CREATININE: CPT

## 2020-07-06 PROCEDURE — 2000000000 HC ICU R&B

## 2020-07-06 PROCEDURE — 2580000003 HC RX 258: Performed by: EMERGENCY MEDICINE

## 2020-07-06 PROCEDURE — 92610 EVALUATE SWALLOWING FUNCTION: CPT

## 2020-07-06 PROCEDURE — 93010 ELECTROCARDIOGRAM REPORT: CPT | Performed by: INTERNAL MEDICINE

## 2020-07-06 PROCEDURE — 83605 ASSAY OF LACTIC ACID: CPT

## 2020-07-06 PROCEDURE — 84100 ASSAY OF PHOSPHORUS: CPT

## 2020-07-06 PROCEDURE — C1751 CATH, INF, PER/CENT/MIDLINE: HCPCS

## 2020-07-06 PROCEDURE — 2500000003 HC RX 250 WO HCPCS: Performed by: HOSPITALIST

## 2020-07-06 PROCEDURE — 85027 COMPLETE CBC AUTOMATED: CPT

## 2020-07-06 PROCEDURE — 6360000002 HC RX W HCPCS: Performed by: EMERGENCY MEDICINE

## 2020-07-06 PROCEDURE — 83735 ASSAY OF MAGNESIUM: CPT

## 2020-07-06 PROCEDURE — 92526 ORAL FUNCTION THERAPY: CPT

## 2020-07-06 PROCEDURE — 84484 ASSAY OF TROPONIN QUANT: CPT

## 2020-07-06 PROCEDURE — 87086 URINE CULTURE/COLONY COUNT: CPT

## 2020-07-06 PROCEDURE — 82550 ASSAY OF CK (CPK): CPT

## 2020-07-06 PROCEDURE — 85025 COMPLETE CBC W/AUTO DIFF WBC: CPT

## 2020-07-06 PROCEDURE — 87077 CULTURE AEROBIC IDENTIFY: CPT

## 2020-07-06 RX ORDER — CALCIUM CARBONATE 500(1250)
500 TABLET ORAL DAILY
Status: DISCONTINUED | OUTPATIENT
Start: 2020-07-06 | End: 2020-07-15 | Stop reason: HOSPADM

## 2020-07-06 RX ORDER — SODIUM CHLORIDE 0.9 % (FLUSH) 0.9 %
10 SYRINGE (ML) INJECTION EVERY 12 HOURS SCHEDULED
Status: DISCONTINUED | OUTPATIENT
Start: 2020-07-06 | End: 2020-07-10 | Stop reason: SDUPTHER

## 2020-07-06 RX ORDER — 0.9 % SODIUM CHLORIDE 0.9 %
1000 INTRAVENOUS SOLUTION INTRAVENOUS ONCE
Status: COMPLETED | OUTPATIENT
Start: 2020-07-06 | End: 2020-07-06

## 2020-07-06 RX ORDER — ACETAMINOPHEN 325 MG/1
650 TABLET ORAL EVERY 6 HOURS PRN
Status: DISCONTINUED | OUTPATIENT
Start: 2020-07-06 | End: 2020-07-15 | Stop reason: HOSPADM

## 2020-07-06 RX ORDER — DONEPEZIL HYDROCHLORIDE 5 MG/1
10 TABLET, FILM COATED ORAL NIGHTLY
Status: DISCONTINUED | OUTPATIENT
Start: 2020-07-06 | End: 2020-07-15 | Stop reason: HOSPADM

## 2020-07-06 RX ORDER — POTASSIUM CHLORIDE 7.45 MG/ML
10 INJECTION INTRAVENOUS PRN
Status: DISCONTINUED | OUTPATIENT
Start: 2020-07-06 | End: 2020-07-07

## 2020-07-06 RX ORDER — PROMETHAZINE HYDROCHLORIDE 25 MG/1
12.5 TABLET ORAL EVERY 6 HOURS PRN
Status: DISCONTINUED | OUTPATIENT
Start: 2020-07-06 | End: 2020-07-15 | Stop reason: HOSPADM

## 2020-07-06 RX ORDER — SODIUM CHLORIDE 0.9 % (FLUSH) 0.9 %
10 SYRINGE (ML) INJECTION EVERY 12 HOURS SCHEDULED
Status: DISCONTINUED | OUTPATIENT
Start: 2020-07-06 | End: 2020-07-15 | Stop reason: HOSPADM

## 2020-07-06 RX ORDER — POLYETHYLENE GLYCOL 3350 17 G/17G
17 POWDER, FOR SOLUTION ORAL DAILY PRN
Status: DISCONTINUED | OUTPATIENT
Start: 2020-07-06 | End: 2020-07-15 | Stop reason: HOSPADM

## 2020-07-06 RX ORDER — ACETAMINOPHEN 650 MG/1
650 SUPPOSITORY RECTAL EVERY 6 HOURS PRN
Status: DISCONTINUED | OUTPATIENT
Start: 2020-07-06 | End: 2020-07-15 | Stop reason: HOSPADM

## 2020-07-06 RX ORDER — DEXTROSE MONOHYDRATE 50 MG/ML
INJECTION, SOLUTION INTRAVENOUS CONTINUOUS
Status: DISCONTINUED | OUTPATIENT
Start: 2020-07-06 | End: 2020-07-06

## 2020-07-06 RX ORDER — ALBUTEROL SULFATE 90 UG/1
2 AEROSOL, METERED RESPIRATORY (INHALATION) EVERY 4 HOURS PRN
Status: DISCONTINUED | OUTPATIENT
Start: 2020-07-06 | End: 2020-07-15 | Stop reason: HOSPADM

## 2020-07-06 RX ORDER — SODIUM CHLORIDE 9 MG/ML
INJECTION, SOLUTION INTRAVENOUS CONTINUOUS
Status: DISCONTINUED | OUTPATIENT
Start: 2020-07-06 | End: 2020-07-15 | Stop reason: HOSPADM

## 2020-07-06 RX ORDER — ACETAMINOPHEN 650 MG/1
650 SUPPOSITORY RECTAL EVERY 6 HOURS PRN
Status: DISCONTINUED | OUTPATIENT
Start: 2020-07-06 | End: 2020-07-14 | Stop reason: SDUPTHER

## 2020-07-06 RX ORDER — NICOTINE POLACRILEX 4 MG
15 LOZENGE BUCCAL PRN
Status: DISCONTINUED | OUTPATIENT
Start: 2020-07-06 | End: 2020-07-15 | Stop reason: HOSPADM

## 2020-07-06 RX ORDER — MAGNESIUM SULFATE 1 G/100ML
1 INJECTION INTRAVENOUS PRN
Status: DISCONTINUED | OUTPATIENT
Start: 2020-07-06 | End: 2020-07-07

## 2020-07-06 RX ORDER — DEXTROSE MONOHYDRATE 50 MG/ML
100 INJECTION, SOLUTION INTRAVENOUS PRN
Status: DISCONTINUED | OUTPATIENT
Start: 2020-07-06 | End: 2020-07-15 | Stop reason: HOSPADM

## 2020-07-06 RX ORDER — POTASSIUM CHLORIDE 7.45 MG/ML
10 INJECTION INTRAVENOUS
Status: COMPLETED | OUTPATIENT
Start: 2020-07-06 | End: 2020-07-06

## 2020-07-06 RX ORDER — HEPARIN SODIUM 5000 [USP'U]/ML
5000 INJECTION, SOLUTION INTRAVENOUS; SUBCUTANEOUS EVERY 8 HOURS SCHEDULED
Status: DISCONTINUED | OUTPATIENT
Start: 2020-07-06 | End: 2020-07-06 | Stop reason: SDUPTHER

## 2020-07-06 RX ORDER — HEPARIN SODIUM 5000 [USP'U]/ML
5000 INJECTION, SOLUTION INTRAVENOUS; SUBCUTANEOUS EVERY 8 HOURS SCHEDULED
Status: DISCONTINUED | OUTPATIENT
Start: 2020-07-06 | End: 2020-07-08

## 2020-07-06 RX ORDER — SODIUM CHLORIDE 0.9 % (FLUSH) 0.9 %
10 SYRINGE (ML) INJECTION PRN
Status: DISCONTINUED | OUTPATIENT
Start: 2020-07-06 | End: 2020-07-15 | Stop reason: HOSPADM

## 2020-07-06 RX ORDER — DULOXETIN HYDROCHLORIDE 30 MG/1
30 CAPSULE, DELAYED RELEASE ORAL DAILY
Status: DISCONTINUED | OUTPATIENT
Start: 2020-07-06 | End: 2020-07-15 | Stop reason: HOSPADM

## 2020-07-06 RX ORDER — LINEZOLID 2 MG/ML
600 INJECTION, SOLUTION INTRAVENOUS EVERY 12 HOURS
Status: DISCONTINUED | OUTPATIENT
Start: 2020-07-06 | End: 2020-07-07

## 2020-07-06 RX ORDER — LIDOCAINE HYDROCHLORIDE 10 MG/ML
5 INJECTION, SOLUTION EPIDURAL; INFILTRATION; INTRACAUDAL; PERINEURAL ONCE
Status: DISCONTINUED | OUTPATIENT
Start: 2020-07-06 | End: 2020-07-15 | Stop reason: HOSPADM

## 2020-07-06 RX ORDER — DEXTROSE MONOHYDRATE 25 G/50ML
12.5 INJECTION, SOLUTION INTRAVENOUS PRN
Status: DISCONTINUED | OUTPATIENT
Start: 2020-07-06 | End: 2020-07-15 | Stop reason: HOSPADM

## 2020-07-06 RX ORDER — BISACODYL 10 MG
10 SUPPOSITORY, RECTAL RECTAL DAILY PRN
Status: DISCONTINUED | OUTPATIENT
Start: 2020-07-06 | End: 2020-07-15 | Stop reason: HOSPADM

## 2020-07-06 RX ORDER — INSULIN LISPRO 100 [IU]/ML
0-12 INJECTION, SOLUTION INTRAVENOUS; SUBCUTANEOUS EVERY 4 HOURS
Status: DISCONTINUED | OUTPATIENT
Start: 2020-07-06 | End: 2020-07-12

## 2020-07-06 RX ORDER — SODIUM CHLORIDE 450 MG/100ML
INJECTION, SOLUTION INTRAVENOUS CONTINUOUS
Status: DISCONTINUED | OUTPATIENT
Start: 2020-07-06 | End: 2020-07-06

## 2020-07-06 RX ORDER — ASPIRIN 81 MG/1
81 TABLET ORAL DAILY
Status: DISCONTINUED | OUTPATIENT
Start: 2020-07-07 | End: 2020-07-15 | Stop reason: HOSPADM

## 2020-07-06 RX ORDER — ONDANSETRON 2 MG/ML
4 INJECTION INTRAMUSCULAR; INTRAVENOUS EVERY 6 HOURS PRN
Status: DISCONTINUED | OUTPATIENT
Start: 2020-07-06 | End: 2020-07-15 | Stop reason: HOSPADM

## 2020-07-06 RX ORDER — MEMANTINE HYDROCHLORIDE 14 MG/1
14 CAPSULE, EXTENDED RELEASE ORAL DAILY
Status: DISCONTINUED | OUTPATIENT
Start: 2020-07-06 | End: 2020-07-06

## 2020-07-06 RX ORDER — ACETAMINOPHEN 325 MG/1
650 TABLET ORAL EVERY 6 HOURS PRN
Status: DISCONTINUED | OUTPATIENT
Start: 2020-07-06 | End: 2020-07-14 | Stop reason: SDUPTHER

## 2020-07-06 RX ORDER — SODIUM CHLORIDE 0.9 % (FLUSH) 0.9 %
10 SYRINGE (ML) INJECTION PRN
Status: DISCONTINUED | OUTPATIENT
Start: 2020-07-06 | End: 2020-07-10 | Stop reason: SDUPTHER

## 2020-07-06 RX ORDER — VITAMIN B COMPLEX
1000 TABLET ORAL DAILY
Status: DISCONTINUED | OUTPATIENT
Start: 2020-07-06 | End: 2020-07-15 | Stop reason: HOSPADM

## 2020-07-06 RX ORDER — POLYVINYL ALCOHOL 14 MG/ML
2 SOLUTION/ DROPS OPHTHALMIC 2 TIMES DAILY
Status: DISCONTINUED | OUTPATIENT
Start: 2020-07-06 | End: 2020-07-06

## 2020-07-06 RX ORDER — UBIDECARENONE 75 MG
50 CAPSULE ORAL DAILY
Status: DISCONTINUED | OUTPATIENT
Start: 2020-07-06 | End: 2020-07-15 | Stop reason: HOSPADM

## 2020-07-06 RX ORDER — DEXTROSE, SODIUM CHLORIDE, AND POTASSIUM CHLORIDE 5; .45; .15 G/100ML; G/100ML; G/100ML
INJECTION INTRAVENOUS CONTINUOUS PRN
Status: DISCONTINUED | OUTPATIENT
Start: 2020-07-06 | End: 2020-07-07

## 2020-07-06 RX ORDER — SODIUM CHLORIDE 9 MG/ML
1000 INJECTION, SOLUTION INTRAVENOUS CONTINUOUS
Status: DISCONTINUED | OUTPATIENT
Start: 2020-07-06 | End: 2020-07-10

## 2020-07-06 RX ORDER — ATORVASTATIN CALCIUM 40 MG/1
40 TABLET, FILM COATED ORAL DAILY
Status: DISCONTINUED | OUTPATIENT
Start: 2020-07-06 | End: 2020-07-15 | Stop reason: HOSPADM

## 2020-07-06 RX ORDER — DEXTROSE MONOHYDRATE 25 G/50ML
12.5 INJECTION, SOLUTION INTRAVENOUS PRN
Status: DISCONTINUED | OUTPATIENT
Start: 2020-07-06 | End: 2020-07-06 | Stop reason: SDUPTHER

## 2020-07-06 RX ORDER — INSULIN LISPRO 100 [IU]/ML
0-6 INJECTION, SOLUTION INTRAVENOUS; SUBCUTANEOUS EVERY 4 HOURS
Status: DISCONTINUED | OUTPATIENT
Start: 2020-07-06 | End: 2020-07-06

## 2020-07-06 RX ORDER — 0.9 % SODIUM CHLORIDE 0.9 %
15 INTRAVENOUS SOLUTION INTRAVENOUS ONCE
Status: DISCONTINUED | OUTPATIENT
Start: 2020-07-06 | End: 2020-07-15 | Stop reason: HOSPADM

## 2020-07-06 RX ADMIN — LINEZOLID 600 MG: 600 INJECTION, SOLUTION INTRAVENOUS at 02:35

## 2020-07-06 RX ADMIN — Medication 10 ML: at 21:58

## 2020-07-06 RX ADMIN — POTASSIUM CHLORIDE 10 MEQ: 7.46 INJECTION, SOLUTION INTRAVENOUS at 15:55

## 2020-07-06 RX ADMIN — INSULIN LISPRO 6 UNITS: 100 INJECTION, SOLUTION INTRAVENOUS; SUBCUTANEOUS at 04:40

## 2020-07-06 RX ADMIN — Medication 10 ML: at 21:57

## 2020-07-06 RX ADMIN — SODIUM CHLORIDE 1000 ML: 9 INJECTION, SOLUTION INTRAVENOUS at 12:22

## 2020-07-06 RX ADMIN — SODIUM CHLORIDE: 4.5 INJECTION, SOLUTION INTRAVENOUS at 08:45

## 2020-07-06 RX ADMIN — POTASSIUM CHLORIDE 10 MEQ: 7.46 INJECTION, SOLUTION INTRAVENOUS at 15:12

## 2020-07-06 RX ADMIN — HEPARIN SODIUM 5000 UNITS: 5000 INJECTION INTRAVENOUS; SUBCUTANEOUS at 15:54

## 2020-07-06 RX ADMIN — SODIUM CHLORIDE 3.03 UNITS/HR: 9 INJECTION, SOLUTION INTRAVENOUS at 17:57

## 2020-07-06 RX ADMIN — SODIUM CHLORIDE 1000 ML: 9 INJECTION, SOLUTION INTRAVENOUS at 08:45

## 2020-07-06 RX ADMIN — INSULIN LISPRO 12 UNITS: 100 INJECTION, SOLUTION INTRAVENOUS; SUBCUTANEOUS at 11:56

## 2020-07-06 RX ADMIN — POTASSIUM CHLORIDE 10 MEQ: 7.46 INJECTION, SOLUTION INTRAVENOUS at 12:45

## 2020-07-06 RX ADMIN — SODIUM CHLORIDE: 4.5 INJECTION, SOLUTION INTRAVENOUS at 04:34

## 2020-07-06 RX ADMIN — HEPARIN SODIUM 5000 UNITS: 5000 INJECTION INTRAVENOUS; SUBCUTANEOUS at 06:31

## 2020-07-06 RX ADMIN — Medication 10 ML: at 08:50

## 2020-07-06 RX ADMIN — SODIUM CHLORIDE 1000 ML: 9 INJECTION, SOLUTION INTRAVENOUS at 01:16

## 2020-07-06 RX ADMIN — POTASSIUM CHLORIDE, DEXTROSE MONOHYDRATE AND SODIUM CHLORIDE: 150; 5; 450 INJECTION, SOLUTION INTRAVENOUS at 23:27

## 2020-07-06 RX ADMIN — INSULIN LISPRO 12 UNITS: 100 INJECTION, SOLUTION INTRAVENOUS; SUBCUTANEOUS at 08:50

## 2020-07-06 RX ADMIN — POTASSIUM CHLORIDE, DEXTROSE MONOHYDRATE AND SODIUM CHLORIDE: 150; 5; 450 INJECTION, SOLUTION INTRAVENOUS at 17:29

## 2020-07-06 RX ADMIN — LINEZOLID 600 MG: 600 INJECTION, SOLUTION INTRAVENOUS at 15:54

## 2020-07-06 RX ADMIN — PIPERACILLIN AND TAZOBACTAM 2.25 G: 2; .25 INJECTION, POWDER, LYOPHILIZED, FOR SOLUTION INTRAVENOUS at 19:07

## 2020-07-06 RX ADMIN — POTASSIUM CHLORIDE 10 MEQ: 7.46 INJECTION, SOLUTION INTRAVENOUS at 13:49

## 2020-07-06 RX ADMIN — HEPARIN SODIUM 5000 UNITS: 5000 INJECTION INTRAVENOUS; SUBCUTANEOUS at 21:46

## 2020-07-06 RX ADMIN — PIPERACILLIN AND TAZOBACTAM 2.25 G: 2; .25 INJECTION, POWDER, LYOPHILIZED, FOR SOLUTION INTRAVENOUS at 10:03

## 2020-07-06 RX ADMIN — PIPERACILLIN SODIUM AND TAZOBACTAM SODIUM 4.5 G: 4; .5 INJECTION, POWDER, LYOPHILIZED, FOR SOLUTION INTRAVENOUS at 01:23

## 2020-07-06 ASSESSMENT — PAIN SCALES - WONG BAKER
WONGBAKER_NUMERICALRESPONSE: 0

## 2020-07-06 NOTE — PROGRESS NOTES
CLINICAL BEDSIDE SWALLOWING EVALUATION  Speech Therapy Department    Patient Name:  Eulalia Walker  :  1933  Pain level:denies   Medical Diagnosis:   Acute kidney injury ([facility] [address] [N17.9]  Acute kidney injury ([facility] [address] [N17.9]    HPI:\"This is a pleasant 80 y.o. female with history of dementia (without behavioral disturbance), diabetes type 2 with hyperglycemia, gastroesophageal reflux disease, hyperlipidemia, essential hypertension, history of CVA, CKD stage III (baseline creatinine 1.2-1.3), who presents from nursing home with reports that patient was found unresponsive in bed. It is unclear how long patient had been unresponsive, suspected to have been anywhere from 1 to 1.5 hours prior to the time he was found. Paramedics were called and patient was brought to the emergency room, noted to respond to painful stimuli with eyes open in the emergency room, moving all extremities. He did not have much improvement with Narcan. Paramedics also report some abnormal ST depression on telemetry prior to arrival in the emergency room. Patient has multiple electrolyte abnormalities on presentation to the emergency room, notably creatinine of 5.0 (baseline 1.2-1.3), sodium 150, potassium 5.4, bicarb 17, anion gap 27, leukocytosis of 26.1, troponin 0 0.34, lactic acid 3.3, proBNP 5257. Chest x-ray did not reveal any consolidation; revealed increased paratracheal opacity for which recommendation was made to rule out malignancy. \"  CXR revealed:  Impression   1. No acute airspace consolidation. 2. Increased right paratracheal opacity, and enlargement of the right hilar   shadow, both possibly representing normal vascular anatomy.  However,   lymphadenopathy or mass lesions in these locations are less likely diagnostic   considerations, and recommend further characterization with a dedicated chest   CT with contrast.     SLP eval and treat orders received. Pt NPO pending assessment.  Per chart, pt with prior CVA and baseline dementia. It does not appear the pt was on a restricted diet prior to admission. Treatment Diagnosis:  Dysphagia    Impressions: Pt was positioned upright in bed on O2 via nasal cannula. She was lethargic and assessment was limited this date. Trials of liquids and puree were able to be provided. Pt demonstrated reduced oral cavity opening for PO assessment. Oral holding was assessed with decreased bolus manipulation. Pharyngeal pooling was suspected with delayed swallow initiation and decreased laryngeal elevation. With limited trials delayed pharyngeal clearing was suspected. No overt clinical s/s of aspiration/penetration were assessed however, pt demonstrates increased risk due to mental status. At this time recommend continuation of NPO with allowance of PO meds with puree as necessary. Dietary Recommendations: NPO with ongoing assessment of swallow function. Allow meds with puree when pt is awake and alert     Strategies: 90 degree positioning with all p.o. intake; small bites/sips; alternate textures through meal; reduce rate of intake    Treatment/Goals: Speech therapy for dysphagia tx 3-5 times per week. ST.) Pt will tolerate ongoing assessment of swallow function with diet to be determined as adequate     Oral motor Exam:  Dentition: adequate   Labial/Facial: limited ROM, decreased oral cavity opening   Lingual: decreased ROM   Voice: limited verbalizations     Oral Phase:   Oral holding  Limited bolus manipulation     Pharyngeal Phase:  Apparent pharyngeal pooling  Delayed swallow initiation  Decreased laryngeal elevation via palpation   ? decreased pharyngeal clearing    Patient/Family Education:Education, results and recommendations given to the Pt and nurse, who verbalized understanding    Timed Code Treatment: 0 minutes     Total Treatment Time: 30 minutes     Discharge Recommendations: Speech Therapy for Speech/Dysphagia treatment at discharge.     If patient discharges prior to next session this note will serve as a discharge summary.       Madeline Ricks, [facility]  Speech Language Pathologist

## 2020-07-06 NOTE — PROGRESS NOTES
Nephrology ok with PICC line placement. Unable to reach daughter for consent. Will place midline if unable to reach daughter for consent.

## 2020-07-06 NOTE — ED PROVIDER NOTES
 CHOLECYSTECTOMY      COLONOSCOPY      DILATATION, ESOPHAGUS      ENDOSCOPY, COLON, DIAGNOSTIC      EYE SURGERY      HYSTERECTOMY      JOINT REPLACEMENT      SIGMOIDOSCOPY  3/18/15    TONSILLECTOMY      UPPER GASTROINTESTINAL ENDOSCOPY  3/18/15         CURRENT MEDICATIONS       Previous Medications    ACETAMINOPHEN (TYLENOL) 500 MG TABLET    Take 500 mg by mouth 3 times daily. ALBUTEROL (PROVENTIL) (2.5 MG/3ML) 0.083% NEBULIZER SOLUTION    Take 2.5 mg by nebulization every 4 hours as needed for Wheezing. ASCORBIC ACID (VITAMIN C) 250 MG TABLET    Take 500 mg by mouth daily    ASPIRIN 81 MG EC TABLET    Take 81 mg by mouth daily. ATORVASTATIN (LIPITOR) 40 MG TABLET    Take 40 mg by mouth daily. BISACODYL (BISAC-EVAC) 10 MG SUPPOSITORY    Place 10 mg rectally daily as needed for Constipation. CHOLECALCIFEROL (VITAMIN D3) 2000 UNITS CAPS    Take 1,000 Units by mouth daily    DONEPEZIL (ARICEPT) 10 MG TABLET    Take 10 mg by mouth nightly. DULOXETINE (CYMBALTA) 30 MG CAPSULE    Take 30 mg by mouth daily. FENTANYL (DURAGESIC) 25 MCG/HR    Place 1 patch onto the skin every 72 hours . Earliest Fill Date: 11/1/17    FERROUS SULFATE 325 (65 FE) MG TABLET    Take 325 mg by mouth daily (with breakfast)    GUAIFENESIN (ROBITUSSIN) 100 MG/5ML LIQUID    Take 200 mg by mouth every 4 hours as needed for Cough. INSULIN DETEMIR (LEVEMIR) 100 UNIT/ML INJECTION    Inject 18 Units into the skin nightly. LOSARTAN (COZAAR) 50 MG TABLET    Take 1 tablet by mouth daily. MEMANTINE ER (NAMENDA XR) 14 MG CP24 EXTENDED RELEASE CAPSULE    Take 28 mg by mouth daily    METFORMIN (GLUCOPHAGE) 500 MG TABLET    Take 500 mg by mouth 2 times daily (with meals). METOPROLOL (LOPRESSOR) 25 MG TABLET    Take 25 mg by mouth 2 times daily. Hold for bp less than 110/60    NYSTATIN (MYCOSTATIN) 098165 UNIT/GM POWDER    Apply  topically 2 times daily.  Under bilateral breasts with peleverus spray    OMEPRAZOLE  None   Social History Narrative    None       SCREENINGS             PHYSICAL EXAM    (up to 7 for level 4, 8 or more for level 5)     ED Triage Vitals [07/05/20 2137]   BP Temp Temp src Pulse Resp SpO2 Height Weight   (!) 152/65 -- -- 117 (!) 32 100 % 5' 5\" (1.651 m) 170 lb (77.1 kg)       Physical Exam  Vitals signs and nursing note reviewed. Constitutional:       Appearance: Normal appearance. She is well-developed. She is not ill-appearing. HENT:      Head: Normocephalic and atraumatic. Right Ear: External ear normal.      Left Ear: External ear normal.      Nose: Nose normal.      Mouth/Throat:      Mouth: Mucous membranes are moist.   Eyes:      General: No scleral icterus. Right eye: No discharge. Left eye: No discharge. Conjunctiva/sclera: Conjunctivae normal.   Neck:      Musculoskeletal: Neck supple. Cardiovascular:      Rate and Rhythm: Tachycardia present. Pulmonary:      Effort: No respiratory distress. Comments: Rapid shallow respirations. Lungs are clear. No sonorous respirations. No stridor. No drooling. Abdominal:      Palpations: Abdomen is soft. Skin:     Coloration: Skin is not pale. Neurological:      Mental Status: She is alert. Comments: Patient is minimally responsive to painful stimuli. Psychiatric:         Mood and Affect: Mood normal.         Behavior: Behavior normal.             DIAGNOSTIC RESULTS     EKG: All EKG's are interpreted by the Emergency Department Physician who either signs or Co-signs this chart in the absence of a cardiologist.    12 lead EKG shows sinus tachycardia with premature atrial complexes with a rate of 126 bpm, OK interval QRS and QTc normal.  Is widespread ST segment depression with T wave inversion. Questionable ST segment elevation in aVR. When compared to the previous EKG 2018 similar morphology except the rate is increased.     RADIOLOGY:   Non-plain film images such as CT, Ultrasound and MRI are read by the radiologist. Plain radiographic images are visualized and preliminarily interpreted by the emergency physician with the below findings:      Interpretation per the Radiologist below, if available at the time of this note:    XR CHEST PORTABLE   Final Result   1. No acute airspace consolidation. 2. Increased right paratracheal opacity, and enlargement of the right hilar   shadow, both possibly representing normal vascular anatomy.   However,   lymphadenopathy or mass lesions in these locations are less likely diagnostic   considerations, and recommend further characterization with a dedicated chest   CT with contrast.               ED BEDSIDE ULTRASOUND:   Performed by ED Physician - none    LABS:  Labs Reviewed   CBC WITH AUTO DIFFERENTIAL - Abnormal; Notable for the following components:       Result Value    WBC 26.1 (*)     MCHC 30.9 (*)     RDW 17.5 (*)     Neutrophils Absolute 24.8 (*)     Lymphocytes Absolute 0.6 (*)     All other components within normal limits    Narrative:     Performed at:  [facility] [address],  [employer]   Phone [phone]   COMPREHENSIVE METABOLIC PANEL W/ REFLEX TO MG FOR LOW K - Abnormal; Notable for the following components:    Sodium 150 (*)     Potassium reflex Magnesium 5.4 (*)     CO2 17 (*)     Anion Gap 26 (*)     Glucose 274 (*)      (*)     CREATININE 5.0 (*)     GFR Non- 8 (*)     GFR  10 (*)     Total Protein 9.5 (*)     Albumin/Globulin Ratio 0.7 (*)     AST 52 (*)     All other components within normal limits    Narrative:     CALL  [facility] tel. [phone],  Chemistry results called to and read back by Dr. Lenka Chaudhary, 07/05/2020  22:36, by Esteban Collins  Performed at:  [facility]  [address],  [place], [employer]   Phone [phone]   LACTATE, SEPSIS - Abnormal; Notable for the following components:    Lactic Acid, Sepsis 3.3 (*)     All other components within normal limits    Narrative:     Performed at:  [facility] [address], [place]   Phone [phone]   BLOOD GAS, VENOUS - Abnormal; Notable for the following components:    pH, Mikey 7.316 (*)     pCO2, Mikey 33.2 (*)     pO2, Mikey 104.0 (*)     HCO3, Venous 16.9 (*)     Base Excess, Mikey -8.3 (*)     Carboxyhemoglobin 3.1 (*)     All other components within normal limits    Narrative:     Performed at:  [facility] [address],  [employer], [address] [employer]   Phone [phone]   BETA-HYDROXYBUTYRATE - Abnormal; Notable for the following components:    Beta-Hydroxybutyrate 2.00 (*)     All other components within normal limits    Narrative:     Performed at:  [facility] [address]  [employer], [place]   Phone [phone]   SALICYLATE LEVEL - Abnormal; Notable for the following components:    Salicylate, Serum <2.8 (*)     All other components within normal limits    Narrative:     Performed at:  [facility] [address]  [employer], [place]   Phone [phone]  - Abnormal; Notable for the following components:    Pro-BNP 5,257 (*)     All other components within normal limits    Narrative:     Performed at:  [facility] [address], [employer], [place]   Phone [phone]   TROPONIN - Abnormal; Notable for the following components:    Troponin 0.34 (*)     All other components within normal limits    Narrative:     Performed at:  [facility] [address]  [employer], [place]   Phone [phone]   PROTIME-INR - Abnormal; Notable for the following components:    Protime 13.3 (*)     All other components within normal limits    Narrative:     Performed at:  [facility]  [address] [facility], [facility], [address] [facility]   Phone [phone]   PROCALCITONIN - Abnormal; Notable for the following components:    Procalcitonin 0.85 (*)     All other components within normal limits    Narrative:     Performed at:  [facility]  [address] [facility], [address]   Phone [phone]   LACTATE DEHYDROGENASE - Abnormal; Notable for the following components:     (*)     All other components within normal limits    Narrative:     Performed at:  [facility]  [address] [facility], [address] [facility]   Phone [phone]   CULTURE, BLOOD 1   CULTURE, BLOOD 2   AMMONIA    Narrative:     Performed at:  [facility]  [address] [facility], [address] [facility]   Phone [phone]   LACTATE, SEPSIS   OSMOLALITY   [id number]   BASIC METABOLIC PANEL   URINE RT REFLEX TO CULTURE   LIPID PANEL       All other labs were within normal range or not returned as of this dictation. EMERGENCY DEPARTMENT COURSE and DIFFERENTIAL DIAGNOSIS/MDM:   Vitals:    Vitals:    07/05/20 2137 07/05/20 2200   BP: (!) 152/65 (!) 158/61   Pulse: 117 130   Resp: (!) 32 (!) 31   SpO2: 100% 99%   Weight: 170 lb (77.1 kg)    Height: 5' 5\" (1.651 m)      Elderly female who comes in of altered mental status. Laboratory studies revealed marked leukocytosis with neutrophilic predominance. She has significant electrolyte derangement of hypernatremia 150, she has an anion gap that is elevated. She has hyperglycemia. Decreased CO2. She has uremia with a creatinine of 5 which is increased from 1.2. She has lactic acidosis at 3.3. She has an elevated BNP as well as her troponin. LDH is elevated as well as. Patient was initially on a nonrebreather but her oxygen saturation and her venous blood gas is high therefore I believe that the patient can be weaned down. She is transition to a nasal cannula.   This patient will need to be admitted to the hospital for further care. She is DNR CC. She is given in the emergency room IV fluids as well as broad-spectrum antibiotics. CRITICAL CARE TIME   Total Critical Care time was 50 minutes, excluding separately reportable procedures. There was a high probability of clinically significant/life threatening deterioration in the patient's condition which required my urgent intervention. CONSULTS:  IP CONSULT TO HOSPITALIST  IP CONSULT TO NEPHROLOGY    PROCEDURES:  Unless otherwise noted above, none     Procedures    FINAL IMPRESSION      1. CYNTHIA (acute kidney injury) ([facility] [address]    2. Uremia    3. Septicemia ([facility] [address]    4.  Suspected COVID-19 virus infection          DISPOSITION/PLAN   DISPOSITION Admitted 07/05/2020 11:32:40 PM      PATIENT REFERREDTO:  Patricia Bravo MD  [address]            DISCHARGEMEDICATIONS:  New Prescriptions    No medications on file          (Please note that portions of this note were completed with a voice recognition program.  Efforts were made to edit the dictations but occasionally words are mis-transcribed.)    Hipolito Graves MD (electronically signed)  Attending Emergency Physician       Hipolito Graves MD  07/06/20 1119

## 2020-07-06 NOTE — H&P
Hospital Medicine History and Physical    7/5/2020    Date of Admission: 7/5/2020    Date of Service: Pt seen/examined on 7/5/2020 and admitted to inpatient. Assessment/plan:  1. Acute metabolic encephalopathy. Could be secondary to multiple electrolyte abnormalities, acute kidney injury, underlying infectious process. Will treat these underlying medical conditions and monitor for improvement. Patient does have underlying dementia at baseline. 2. Acute kidney injury on CKD stage III. Baseline creatinine 1.2-1.3; presents with creatinine 5.0. Suspect possible prerenal azotemia, cannot exclude obstructive nephropathy. Check CK, urinalysis, renal ultrasound. Will consult nephrology to assist with evaluation and management. Dose medications renally. Avoid nephrotoxic medications. 3. Sepsis, etiology currently unclear. Check urinalysis to rule out urinary source (urinalysis not available at the time of admission). Could also be underlying pneumonia, rule out COVID. COVID testing currently pending. Consider repeat chest x-ray in the morning (post hydration). Continue Zyvox and Zosyn. Continue gentle intravenous fluid. Follow cultures. 4. Acute respiratory failure with hypoxia. Suspect secondary to underlying pulmonary infectious process. As noted above, consider repeat chest x-ray in the morning (post hydration). Rule out COVID-19. PRN albuterol inhaler in place. Maintain on droplet precautions. 5. Elevated troponin of 0.34, abnormal EKG. Could be secondary to underlying sepsis. Will obtain serial troponin, echocardiogram.  Continue aspirin, statin. Check lipid profile in the morning. Depends on troponin trend, consider cardiology consult to assist with evaluation. 6. Hypernatremia. Could be secondary to dehydration. Continue 0.45% normal saline and recheck sodium in the morning. Nephrology has been consulted to assist with management. 7. Hyperkalemia, mild, potassium 5.4. Likely secondary to diminished renal excretion in the setting of acute kidney injury. Expect improvement with correction of underlying acute kidney injury. Continue gentle intravenous fluid. 8. Anion gap metabolic acidosis. Likely secondary to underlying kidney disease. Continue management of underlying acute kidney injury, monitor for improvement. 9. Lactic acidosis. Likely secondary to underlying sepsis (as noted above). On gentle intravenous fluid, antibiotics. Trend lactic acid level. Follow cultures. 10. Abnormal chest x-ray with increased paratracheal opacity, rule out malignancy. Consider CT- chest with contrast once renal function stable, especially if family willing to pursue further evaluation to rule out malignancy. 11. Other comorbidities: History of dementia without behavioral disturbance, diabetes type 2 with hyperglycemia, gastroesophageal reflux disease, hyperlipidemia, essential hypertension, history of CVA. Activities: Up with assist  Prophylaxis: Subcutaneous heparin  Code status: DNR-CC. I discussed with patient's sister at bedside was verified the patient's CODE STATUS is DNR CC, as supported by documents sent from nursing home.    ==========================================================  Chief complaint:  Chief Complaint   Patient presents with    Altered Mental Status     pt brought by [facility] from [facility] with being found unresponsive by nurse; last seen normal approximately 1.5 hours ago; nurse came in and found patient like this; 35 mcg Fentanyl patch on patient and removed by EMS; given Narcan 2 mg IV by EMS; per EMS, pt has ST-elevation in v2, v3, and aVf     History of Presenting Illness:   This is a pleasant 80 y.o. female with history of dementia (without behavioral disturbance), diabetes type 2 with hyperglycemia, gastroesophageal reflux disease, hyperlipidemia, essential hypertension, history of CVA, CKD stage III (baseline creatinine 1.2-1.3), who 2 times daily. Historical Provider, MD   senna-docusate (PERICOLACE) 8.6-50 MG per tablet Take 2 tablets by mouth daily. Historical Provider, MD   donepezil (ARICEPT) 10 MG tablet Take 10 mg by mouth nightly. Historical Provider, MD   metformin (GLUCOPHAGE) 500 MG tablet Take 500 mg by mouth 2 times daily (with meals). Historical Provider, MD   metoprolol (LOPRESSOR) 25 MG tablet Take 25 mg by mouth 2 times daily. Hold for bp less than 110/60    Historical Provider, MD   insulin detemir (LEVEMIR) 100 UNIT/ML injection Inject 18 Units into the skin nightly. Historical Provider, MD   TRAZODONE HCL Take 25 mg by mouth nightly     Historical Provider, MD   atorvastatin (LIPITOR) 40 MG tablet Take 40 mg by mouth daily. Historical Provider, MD   DULoxetine (CYMBALTA) 30 MG capsule Take 30 mg by mouth daily. Historical Provider, MD       Allergy(ies):  Patient has no known allergies. Social History:  TOBACCO:  reports that she has quit smoking. She has a 3.75 pack-year smoking history. She has never used smokeless tobacco.  ETOH:  reports no history of alcohol use. Family History:  Unable to obtain as patient has underlying dementia. Review of Systems:  Unable to obtain as patient has underlying dementia. Vitals and physical examination:  BP (!) 158/61   Pulse 130   Resp (!) 31   Ht 5' 5\" (1.651 m)   Wt 170 lb (77.1 kg)   SpO2 99%   BMI 28.29 kg/m²   Gen/overall appearance: Not in acute distress. Head: Normocephalic, atraumatic  Eyes: Difficult to assess as patient is somnolent. Neck: No JVD, thyromegaly  CVS: Nml S1S2, no MRG. Tachycardic. Pulm: Clear bilaterally. No crackles/wheezes  Gastrointestinal: Soft, NT/ND, +BS  Musculoskeletal: No edema. Warm  Neuro: Difficult to assess as patient is somnolent  Psychiatry: Unable to assess at this time. Skin: Warm, dry with normal turgor.  No rash  Capillary refill: Brisk,< 3 seconds   Peripheral Pulses: +2 palpable, equal bilaterally

## 2020-07-06 NOTE — PROGRESS NOTES
Pt. Sent to ICU for DKA protocol insulin gtt. Pt. With poor venous. Multiple lines needed. Labs not obtained, lab not able to draw blood poor veins. RN attempt to place a second PIV and draw blood unsuccessful X2.

## 2020-07-06 NOTE — PROGRESS NOTES
[facility]    7/6/2020 8:00 AM        Name: Sonia Leahy . Admitted: 7/5/2020  Primary Care Provider: Shannan Padgett MD (Tel: [phone])                        Hospital course:   80 y.o. female with history of dementia (without behavioral disturbance), diabetes type 2 with hyperglycemia, gastroesophageal reflux disease, hyperlipidemia, essential hypertension, history of CVA, CKD stage III (baseline creatinine 1.2-1.3), who presents from nursing home with reports that patient was found unresponsive in bed. It is unclear how long patient had been unresponsive, suspected to have been anywhere from 1 to 1.5 hours prior to the time he was found. Paramedics were called and patient was brought to the emergency room      Subjective: The patient making eye contact and answering questions however very limited    No acute events overnight. Resting well. Pain control. Diet ok. Labs reviewed  Denies any chest pain sob.      Reviewed interval ancillary notes    Current Medications  aspirin EC tablet 81 mg, Daily  atorvastatin (LIPITOR) tablet 40 mg, Daily  bisacodyl (DULCOLAX) suppository 10 mg, Daily PRN  Vitamin D (CHOLECALCIFEROL) tablet 1,000 Units, Daily  donepezil (ARICEPT) tablet 10 mg, Nightly  DULoxetine (CYMBALTA) extended release capsule 30 mg, Daily  memantine ER (NAMENDA XR) extended release capsule 14 mg, Daily  metoprolol tartrate (LOPRESSOR) tablet 25 mg, BID  oyster calcium (OYST-DICKSON) 500 MG tablet 500 mg, Daily  polyvinyl alcohol (LIQUIFILM TEARS) 1.4 % ophthalmic solution 2 drop, BID  psyllium (HYDROCIL) 95 % packet 1 packet, Daily  vitamin B-12 (CYANOCOBALAMIN) tablet 50 mcg, Daily  albuterol sulfate  (90 Base) MCG/ACT inhaler 2 puff, Q4H PRN  sodium chloride flush 0.9 % injection 10 mL, 2 times per day  sodium chloride flush 0.9 % injection 10 mL, PRN  acetaminophen (TYLENOL) tablet 650 mg, Q6H PRN    Or  acetaminophen (TYLENOL) suppository 650 mg, Q6H PRN  heparin (porcine) injection 5,000 Units, 3 times per day  piperacillin-tazobactam (ZOSYN) 2.25 g in dextrose 5 % 50 mL IVPB (mini-bag), Q8H  linezolid (ZYVOX) IVPB 600 mg, Q12H  glucose (GLUTOSE) 40 % oral gel 15 g, PRN  dextrose 50 % IV solution, PRN  glucagon (rDNA) injection 1 mg, PRN  dextrose 5 % solution, PRN  dextrose 5 % solution, Continuous  insulin lispro (1 Unit Dial) 0-12 Units, Q4H  aspirin chewable tablet 324 mg, Once        Objective:  /79   Pulse 114   Temp 97.4 °F (36.3 °C) (Temporal)   Resp 22   Ht 5' 5\" (1.651 m)   Wt 170 lb 3.2 oz (77.2 kg)   SpO2 99%   BMI 28.32 kg/m²   No intake or output data in the 24 hours ending 07/06/20 0800   Wt Readings from Last 3 Encounters:   07/06/20 170 lb 3.2 oz (77.2 kg)   07/13/18 170 lb 14.4 oz (77.5 kg)   02/28/18 163 lb (73.9 kg)       General appearance: -American elderly lady, making eye contact, not much verbal with me however in the morning, follow commands by nursing staff appears comfortable  Eyes: Sclera clear. Pupils equal.  ENT: Moist oral mucosa. Trachea midline, no adenopathy. Cardiovascular: Regular rhythm, normal S1, S2. No murmur. No edema in lower extremities  Respiratory: Not using accessory muscles. Good inspiratory effort. Clear to auscultation bilaterally, no wheeze or crackles. GI: Abdomen soft, no tenderness, not distended, normal bowel sounds, Garrett catheter intact  Musculoskeletal: No cyanosis in digits, neck supple  Neurology: CN 2-12 grossly intact. No speech or motor deficits  Psych: Normal affect.  Alert and oriented in time, place and person  Skin: Warm, dry, normal turgor  Extremities no peripheral edema noted  Labs and Tests:  CBC:   Recent Labs     07/05/20  2154 07/06/20  0438   WBC 26.1* 30.6*   HGB 12.0 11.5*   HCT 38.9 37.2   MCV 92.6 96.1    273     BMP:    Recent Labs 07/05/20 2154 07/06/20 0228 07/06/20  0438   * 154* 154*   K 5.4* 4.4 4.8    112* 113*   CO2 17* 16* 16*   * 186* 200*   CREATININE 5.0* 4.8* 5.0*   GLUCOSE 274* 348* 384*     Hepatic:   Recent Labs     07/05/20 2154 07/06/20  0438   AST 52* 46*   ALT 18 18   BILITOT 0.3 0.4   ALKPHOS 89 122         Problem List  Active Problems:    Acute kidney injury ([facility] [address]  Resolved Problems:    * No resolved hospital problems. *       Assessment & Plan:   1. Acute metabolic encephalopathy. Could be secondary to multiple electrolyte abnormalities, acute kidney injury, underlying infectious process. Will treat these underlying medical conditions and monitor for improvement. Patient does have underlying dementia at baseline. 2. Acute kidney injury on CKD stage III. Baseline creatinine 1.2-1.3; creatinine 5.0 today. Suspect possible prerenal azotemia, cannot exclude obstructive nephropathy. Nephrology service consulted   3. Urosepsis,significant pyuria noted, significant leukocytosis noted, COVID testing currently pending. Continue empiric Zyvox and Zosyn. Continue gentle intravenous fluid. Follow cultures. 4. Acute respiratory failure with hypoxia. Suspect secondary to underlying pulmonary infectious process. Currently 96% and room air, posterior right-sided crackles noted COVID-19 pending . Maintain on droplet precautions. 5. Elevated troponin of 0.34, abnormal EKG. Could be secondary to underlying sepsis and acute kidney injury. Downtrending, continue with aspirin, statin. Check lipid profile in the morning. Depends on troponin trend, consider cardiology consult to assist with evaluation. 6. Hypernatremia. Could be secondary to dehydration. Continue sodium chloride 0.45% 100 ml/h. Nephrology has been consulted to assist with management. 7. Hyperkalemia, resolved anion gap metabolic acidosis. Likely secondary to underlying kidney disease.   Continue management of underlying acute kidney injury, monitor for improvement. 8. Lactic acidosis. Likely secondary to underlying sepsis (as noted above). On gentle intravenous fluid, antibiotics. Lactic acid stable around 3.0   9. Abnormal chest x-ray with increased paratracheal opacity, rule out malignancy. Consider CT- chest with contrast once renal function stable, especially if family willing to pursue further evaluation to rule out malignancy. 10.  History of dementia without behavioral disturbance, continue donezepil  11.  diabetes type 2 with hyperglycemia: Blood sugar up to 400, we will stop D5 water switch to half-normal saline continue medium dose sliding scale insulin  12.  Other comorbidities include gastroesophageal reflux disease, hyperlipidemia, essential hypertension, history of CVA.         Diet: Diet NPO Effective Now  Code:DNR-CC  DVT PPX lovenox       Shobha Cha MD   7/6/2020 8:00 AM

## 2020-07-06 NOTE — PROGRESS NOTES
Admission assessment complete. Pt responsive to pain. Blood glucose 427, 6 units of lispro given subcutaneous, hospitalist notified. Pt had large dark formed BM, notified night shift hospitalist, occult stool collected and sent to lab. Vital signs stable.

## 2020-07-06 NOTE — CONSULTS
P Pulmonary and Critical Care   Consult Note      Reason for Consult: DKA, renal failure  Requesting Physician: Dr Hanson [facility]:   [address] [facility] / HPI:                The patient is a 80 y.o. female with significant past medical history of:      Diagnosis Date    Arthritis     Chronic kidney disease     Constipation     D[facility] ([facility] [address]     Depression     Diabetes mellitus ([facility] [address]     GERD (gastroesophageal reflux disease)     Glaucoma     Hyperlipidemia     Hypertension     MI (myocardial infarction) ([facility] [address]     Muscle weakness (generalized)     Unspecified cerebral artery occlusion with cerebral infarction     UTI (lower urinary tract infection)      Patient brought from the nursing home to the emergency department yesterday after being found unresponsive. It had been about 1.5 hours since the patient had been previously seen in her normal state. She does have a history of dementia, CVA and stage III kidney disease.     Past Surgical History:        Procedure Laterality Date    APPENDECTOMY      BREAST SURGERY      CHOLECYSTECTOMY      COLONOSCOPY      DILATATION, ESOPHAGUS      ENDOSCOPY, COLON, DIAGNOSTIC      EYE SURGERY      HYSTERECTOMY      JOINT REPLACEMENT      SIGMOIDOSCOPY  3/18/15    TONSILLECTOMY      UPPER GASTROINTESTINAL ENDOSCOPY  3/18/15     Current Medications:    Current Facility-Administered Medications: [START ON 7/7/2020] aspirin EC tablet 81 mg, 81 mg, Oral, Daily  atorvastatin (LIPITOR) tablet 40 mg, 40 mg, Oral, Daily  bisacodyl (DULCOLAX) suppository 10 mg, 10 mg, Rectal, Daily PRN  Vitamin D (CHOLECALCIFEROL) tablet 1,000 Units, 1,000 Units, Oral, Daily  [Held by provider] donepezil (ARICEPT) tablet 10 mg, 10 mg, Oral, Nightly  [Held by provider] DULoxetine (CYMBALTA) extended release capsule 30 mg, 30 mg, Oral, Daily  metoprolol tartrate (LOPRESSOR) tablet 25 mg, 25 mg, Oral, BID  calcium elemental (OSCAL) tablet 500 mg, 500 mg, Oral, Daily  vitamin hepatospleenomegaly  LYMPHADENOPATHY:  no axillary or supraclavicular adenopathy. No cervical adnenopathy  PSYCHIATRIC: Encephalopathic. MUSCULOSKELETAL: No obvious misalignment or effusion of the joints. No clubbing, cyanosis of the digits. SKIN:  normal skin color, texture, turgor and no redness, warmth, or swelling. No palpable nodules    DATA:    Old records have been reviewed    CBC:  Recent Labs     07/05/20  2154 07/06/20  0438   WBC 26.1* 30.6*   RBC 4.21 3.87*   HGB 12.0 11.5*   HCT 38.9 37.2    273   MCV 92.6 96.1   MCH 28.6 29.6   MCHC 30.9* 30.8*   RDW 17.5* 17.6*      BMP:  Recent Labs     07/06/20  0438 07/06/20  1056 07/06/20  1605   * 143 153*   K 4.8 2.9* 4.1   * 100 114*   CO2 16* 13* 18*   * 162* 188*   CREATININE 5.0* 4.3* 5.0*   CALCIUM 9.6 7.9* 9.6   GLUCOSE 384* 626* 218*      ABG:  No results for input(s): PHART, HMH9XFK, PO2ART, DYA6ZCT, P3VQZAZV, BEART in the last 72 hours. No results found for: BNP  Lab Results   Component Value Date    CKTOTAL 1,552 (H) 07/06/2020    TROPONINI 0.22 (H) 07/06/2020       Cultures:     Abx:    Radiology Review:  All pertinent images / reports were reviewed as a part of this visit. Assessment:     1. Acute encephalopathy  · I have reviewed laboratories, medical records and images for this visit  · Most likely metabolic with blood sugar that has gotten as high as 600, creatinine of 5 and pH 7.32.  · Also the possibility of sepsis exists as her procalcitonin is elevated and her white count is markedly elevated. · She did have fever as high as 102.6 degrees  · Serum lactate is also elevated at 3.3 and improving.     2.  Severe sepsis  · Elevated lactate  · Fever 102  · WBC 31,000  · Procalcitonin is elevated but chest x-ray is clear  · Clear chest x-ray could be on the basis of dehydration  · Cultures pending  · COVID negative  · Hemodynamically stable not requiring pressors  · She is on Zosyn/Zyvox, appropriate pending culture results    3. Acute on chronic kidney disease  · Serum creatinine was 5 on presentation. This came down to 4.3 but is back up to 5.0.  · However, she is not profoundly acidemic. Serum bicarb is increased to 18. Her potassium is 4.1. She does not appear to need urgent dialysis. · However she may ultimately need dialysis and nephrology is following her closely    3. DKA  · She did have a blood sugar as high as 626  · However, that is down to 218 just with fluid resuscitation  · Serum bicarb is up to 18  · I do not think she needs an insulin drip  · Could maintain her with sliding scale insulin  · Continue gentle rehydration as able    Total critical care time caring for this patient with life threatening illness, including direct patient contact, management of life support systems, review of data including imaging and labs, discussions with other team members and physicians is at least 32 minutes so far today, excluding procedures.

## 2020-07-06 NOTE — ED NOTES
Pt has remained the same as per initial note throughout ED stay. Opens eyes when oral care performed but then closes when not stimulated. Both arms drawing inward intermittently. Dr. Erika Lazo aware; no new orders received. Sinus tachycardia remains on monitor with rate in 110s with marked ST depression in lead II. Oxygen remains @ 4L/NC with Sp02 = 100%; turned down to 2L/NC. Other interventions as noted. Pt to be admitted. Pt's sister here earlier to visit with patient.      Jorge Snow RN  07/06/20 1793

## 2020-07-06 NOTE — PROGRESS NOTES
Pt. Transferred from . Poor venous access. Lab not able to draw serial labs for DKA protocol. HOspitalist Orlando notified that I need immediate IV access to carry out pt labs. Abx due as well as insulin, maintenance IVF;s and electrolyte replacement.   Thank-You

## 2020-07-06 NOTE — PROGRESS NOTES
[address] [facility] [facility]      Orders received for PT/OT evaluation. Chart reviewed. Patient currently being ruled out for COVID-19. Will hold therapy at this time per infection control and PPE conservation effort and will follow up with pt pending results of testing.  Thanks, Shannan De La Vega, PT, DPT [id number], Aleida Giles, OTR/L [id number]

## 2020-07-06 NOTE — CONSULTS
Office : [phone]     Fax :[phone]       Nephrology Consult Note      Patient's Name: Gustavo Hernandez  9:45 AM  7/6/2020    Reason for Consult:  CYNTHIA, hypernatremia       Requesting Physician:  Naya Floyd MD      Chief Complaint:    Chief Complaint   Patient presents with    Altered Mental Status     pt brought by [facility] from [facility] with being found unresponsive by nurse; last seen normal approximately 1.5 hours ago; nurse came in and found patient like this; 35 mcg Fentanyl patch on patient and removed by EMS; given Narcan 2 mg IV by EMS; per EMS, pt has ST-elevation in v2, v3, and aVf         History of Present Ilness:    Gustavo Hernandez is a 80 y.o. female with history of dementia  diabetes type 2 with hyperglycemia, gastroesophageal reflux disease, hyperlipidemia, essential hypertension, history of CVA, CKD stage III (baseline creatinine 1.2-1.3), who presents from nursing home with reports that patient was found unresponsive in bed. It is unclear how long patient had been unresponsive, suspected to have been anywhere from 1 to 1.5 hours prior to the time he was found. Paramedics were called and patient was brought to the emergency room, noted to respond to painful stimuli with eyes open in the emergency room, moving all extremities. He did not have much improvement with Narcan. Paramedics also report some abnormal ST depression on telemetry prior to arrival in the emergency room.     Patient has multiple electrolyte abnormalities on presentation to the emergency room, notably creatinine of 5.0 (baseline 1.2-1.3), sodium 150, potassium 5.4, bicarb 17, anion gap 27, leukocytosis of 26.1, troponin 0 0.34, lactic acid 3.3, proBNP 5257.   Chest x-ray did not reveal any consolidation; revealed increased paratracheal opacity for which recommendation was made to rule out malignancy. All history is gathered from medical records     Past Medical History:   Diagnosis Date    Arthritis     Chronic kidney disease     Constipation     [facility] [address]     Depression     Diabetes mellitus ([facility] [address]     GERD (gastroesophageal reflux disease)     Glaucoma     Hyperlipidemia     Hypertension     MI (myocardial infarction) ([facility] [address]     Muscle weakness (generalized)     Unspecified cerebral artery occlusion with cerebral infarction     UTI (lower urinary tract infection)        Past Surgical History:   Procedure Laterality Date    APPENDECTOMY      BREAST SURGERY      CHOLECYSTECTOMY      COLONOSCOPY      DILATATION, ESOPHAGUS      ENDOSCOPY, COLON, DIAGNOSTIC      EYE SURGERY      HYSTERECTOMY      JOINT REPLACEMENT      SIGMOIDOSCOPY  3/18/15    TONSILLECTOMY      UPPER GASTROINTESTINAL ENDOSCOPY  3/18/15       History reviewed. No pertinent family history. reports that she has quit smoking. She has a 3.75 pack-year smoking history. She has never used smokeless tobacco. She reports that she does not drink alcohol or use drugs. Allergies:  Patient has no known allergies.     Current Medications:    aspirin EC tablet 81 mg, Daily  atorvastatin (LIPITOR) tablet 40 mg, Daily  bisacodyl (DULCOLAX) suppository 10 mg, Daily PRN  Vitamin D (CHOLECALCIFEROL) tablet 1,000 Units, Daily  donepezil (ARICEPT) tablet 10 mg, Nightly  DULoxetine (CYMBALTA) extended release capsule 30 mg, Daily  memantine ER (NAMENDA XR) extended release capsule 14 mg, Daily  metoprolol tartrate (LOPRESSOR) tablet 25 mg, BID  oyster calcium (OYST-DICKSON) 500 MG tablet 500 mg, Daily  polyvinyl alcohol (LIQUIFILM TEARS) 1.4 % ophthalmic solution 2 drop, BID  psyllium (HYDROCIL) 95 % packet 1 packet, Daily  vitamin B-12 (CYANOCOBALAMIN) tablet 50 mcg, Daily  albuterol sulfate  (90 Base) MCG/ACT inhaler 2 puff, Q4H PRN  sodium chloride flush 0.9 % injection 10 mL, 2 times per day  sodium chloride flush 0.9 % injection 10 mL, PRN  acetaminophen (TYLENOL) tablet 650 mg, Q6H PRN    Or  acetaminophen (TYLENOL) suppository 650 mg, Q6H PRN  heparin (porcine) injection 5,000 Units, 3 times per day  piperacillin-tazobactam (ZOSYN) 2.25 g in dextrose 5 % 50 mL IVPB (mini-bag), Q8H  linezolid (ZYVOX) IVPB 600 mg, Q12H  glucose (GLUTOSE) 40 % oral gel 15 g, PRN  dextrose 50 % IV solution, PRN  glucagon (rDNA) injection 1 mg, PRN  dextrose 5 % solution, PRN  insulin lispro (1 Unit Dial) 0-12 Units, Q4H  0.45 % sodium chloride infusion, Continuous  0.9 % sodium chloride bolus, Once  aspirin chewable tablet 324 mg, Once        Review of Systems:   Could not be obtained because of AMS      Physical exam:     Vitals:  /78   Pulse 94   Temp 97.8 °F (36.6 °C) (Axillary)   Resp 20   Ht 5' 5\" (1.651 m)   Wt 170 lb 3.2 oz (77.2 kg)   SpO2 99%   BMI 28.32 kg/m²   Constitutional:  Spontaneous eye opening   Skin: no rash, turgor - oral mucosa dry   Heent:  eomi, mmm  Neck: no bruits or jvd noted  Cardiovascular:  S1, S2 without m/r/g  Respiratory: CTA B without w/r/r  Abdomen:  +bs, soft, nt, nd  Ext: no  lower extremity edema  Psychiatric: calm  Musculoskeletal:  Limited exam because of AMS.   Labs:  CBC:   Recent Labs     07/05/20 2154 07/06/20 0438   WBC 26.1* 30.6*   HGB 12.0 11.5*    273     BMP:    Recent Labs     07/05/20 2154 07/06/20 0228 07/06/20 0438   * 154* 154*   K 5.4* 4.4 4.8    112* 113*   CO2 17* 16* 16*   * 186* 200*   CREATININE 5.0* 4.8* 5.0*   GLUCOSE 274* 348* 384*     Ca/Mg/Phos:   Recent Labs     07/05/20 2154 07/06/20 0228 07/06/20  0438   CALCIUM 10.6 9.3 9.6   MG  --   --  2.60*   PHOS  --   --  6.4*     Hepatic:   Recent Labs     07/05/20 2154 07/06/20 0438   AST 52* 46*   ALT 18 18   BILITOT 0.3 0.4   ALKPHOS 89 122     Troponin:   Recent Labs 07/05/20 2154 07/06/20 0228 07/06/20  0702   TROPONINI 0.34* 0.36* 0.22*     BNP: No results for input(s): BNP in the last 72 hours. Lipids: No results for input(s): CHOL, TRIG, HDL, LDLCALC, LABVLDL in the last 72 hours. ABGs: No results for input(s): PHART, PO2ART, KGC9FMS in the last 72 hours. INR:   Recent Labs     07/05/20 2154   INR 1.14     UA:  Recent Labs     07/06/20 0228   COLORU YELLOW   CLARITYU TURBID*   GLUCOSEU Negative   BILIRUBINUR Negative   KETUA Negative   SPECGRAV 1.015   BLOODU MODERATE*   PHUR 5.5   PROTEINU 30*   UROBILINOGEN 0.2   NITRU Negative   LEUKOCYTESUR LARGE*   LABMICR YES   URINETYPE NotGiven      Urine Microscopic:   Recent Labs     07/06/20 0228   WBCUA >100*   RBCUA see below*     Urine Culture: No results for input(s): LABURIN in the last 72 hours. Urine Chemistry: No results for input(s): Jan Bunde, PROTEINUR, NAUR in the last 72 hours. IMAGING:  XR CHEST PORTABLE   Final Result   1. No acute airspace consolidation. 2. Increased right paratracheal opacity, and enlargement of the right hilar   shadow, both possibly representing normal vascular anatomy. However,   lymphadenopathy or mass lesions in these locations are less likely diagnostic   considerations, and recommend further characterization with a dedicated chest   CT with contrast.         US RENAL COMPLETE    (Results Pending)       No intake/output data recorded. Assessment/Plan :      1. CYNTHIA. likely pre renal   Hold losartan, metformin   Check urine studies   Check ck level   ? Hyperosmolar state / DKA  Beta hydroxy level elevated   Venous  ph 7.31     Recommend to dose adjust all medications  based on renal functions  Maintain SBP> 90 mmHg   Daily weights   AVOID NSAIDs  Avoid Nephrotoxins  Monitor Intake/Output  Call if significant decrease in urine output     2. HTN. Controlled BP     3. High anion gap acidosis 2/2 CYNTHIA   lactic acid level is 3  Hold metformin     4.   AMS 2/2 to

## 2020-07-06 NOTE — ED NOTES
Pt in from [facility] by [facility] being unresponsive for approximately 1.5 hours (approximate time given pt was last seen well although precise isn't known). Per EMS, nurse came and found pt in this state. Squad gave Narcan 2 mg IV with no response. Upon arrival, pt with eyes open briefly to painful stimulus; has some spontaneous movement of bilateral upper extremities. Respirations shallow and tachypneic @ high 20s - low 30s on 15L/NRB. Sinus tachycardia noted on monitor with rate in 130s. HOB elevated. On cyclical BPS every 15 minutes and continuous pulse oximetry.   BP stable     [address], RN  07/05/20 2207

## 2020-07-06 NOTE — DISCHARGE INSTR - COC
Continuity of Care Form    Patient Name: Caridad Ordaz   :  1933  MRN:  [id number]    Admit date:  2020  Discharge date:  7/15/20    Code Status Order: DNR-CC   Advance Directives:     Admitting Physician:  Argenis Dean MD  PCP: Karine Awan MD    Discharging Nurse: [facility] Unit/Room#: BJ-1153/4585-03  Discharging Unit Phone Number: [phone]    Emergency Contact:   Extended Emergency Contact Information  Primary Emergency Contact: Tayler Sims[address] Phone: [phone]  Mobile Phone: [phone]  Relation: Brother/Sister  Secondary Emergency Contact: [address] Phone: [phone]  Mobile Phone: [phone]  Relation: Child    Past Surgical History:  Past Surgical History:   Procedure Laterality Date    APPENDECTOMY      BREAST SURGERY      CHOLECYSTECTOMY      COLONOSCOPY      DILATATION, ESOPHAGUS      ENDOSCOPY, COLON, DIAGNOSTIC      EYE SURGERY      HYSTERECTOMY      JOINT REPLACEMENT      SIGMOIDOSCOPY  3/18/15    TONSILLECTOMY      UPPER GASTROINTESTINAL ENDOSCOPY  3/18/15       Immunization History: There is no immunization history on file for this patient.     Active Problems:  Patient Active Problem List   Diagnosis Code    Acute lower GI bleeding K92.2    Chronic blood loss anemia D50.0    Dementia (HCC) F03.90    Morbid obesity (HCC) E66.01    Diabetes mellitus (HCC) E11.9    Closed displaced fracture of right patella S82.001A    Status post total right knee replacement Z96.651    Constipation K59.00    Acute kidney injury ([facility] [address] N17.9    DKA, type 1, not at goal [facility]) E10.10       Isolation/Infection:   Isolation          C Diff Contact        Patient Infection Status     Infection Onset Added Last Indicated Last Indicated By Review Planned Expiration Resolved Resolved By    C-diff Rule Out 20 Clostridium difficile toxin/antigen (Ordered)        Resolved    COVID-19 Rule Out 07/05/20 07/05/20 07/05/20 COVID-19 (Ordered)   07/06/20 Rule-Out Test Resulted          Nurse Assessment:  Last Vital Signs: BP (!) 126/54   Pulse 110   Temp 97.4 °F (36.3 °C) (Axillary)   Resp 16   Ht 5' 5\" (1.651 m)   Wt 165 lb 9.1 oz (75.1 kg)   SpO2 98%   BMI 27.55 kg/m²     Last documented pain score (0-10 scale):    Last Weight:   Wt Readings from Last 1 Encounters:   07/07/20 165 lb 9.1 oz (75.1 kg)     Mental Status:  disoriented and alert    IV Access:  - None    Nursing Mobility/ADLs:  Walking   Dependent  Transfer  Dependent  Bathing  Dependent  Dressing  Dependent  Toileting  Dependent  Feeding  Dependent  Med Admin  Dependent  Med Delivery   crushed and prefers mixed with applesauce    Wound Care Documentation and Therapy:  Wound 07/06/20 Buttocks Right skin tear 1 cm x 2.5 cm (Active)   Wound Pressure Stage  2 7/7/2020  8:00 AM   Dressing Status Clean;Dry; Intact 7/7/2020  8:00 AM   Dressing Changed Changed/New 7/6/2020  8:00 PM   Dressing/Treatment Foam 7/7/2020  8:00 AM   Wound Length (cm) 2.5 cm 7/7/2020  8:00 AM   Wound Width (cm) 1 cm 7/7/2020  8:00 AM   Wound Surface Area (cm^2) 2.5 cm^2 7/7/2020  8:00 AM   Change in Wound Size % (l*w) 0 7/7/2020  8:00 AM   Wound Assessment Clean;Pink 7/7/2020  8:00 AM   Drainage Amount None 7/7/2020  8:00 AM   Odor None 7/7/2020  8:00 AM   Margins Defined edges 7/7/2020  8:00 AM   June-wound Assessment Intact; Clean 7/7/2020  8:00 AM   Red%Wound Bed 100 7/6/2020  4:00 PM   Number of days: 0        Elimination:  Continence:   · Bowel: No  · Bladder: No  Urinary Catheter: None   Colostomy/Ileostomy/Ileal Conduit: No       Date of Last BM: 7/14/2020    Intake/Output Summary (Last 24 hours) at 7/7/2020 1118  Last data filed at 7/7/2020 0614  Gross per 24 hour   Intake 4873 ml   Output 875 ml   Net 3998 ml     I/O last 3 completed shifts:   In: 3469 [I.V.:3783; IV Piggyback:2090]  Out: 318 [Urine:875]    Safety Concerns: At Risk for Falls and Aspiration Risk    Impairments/Disabilities:      Speech and Contractures - hands    Nutrition Therapy:  Current Nutrition Therapy:   - Oral Diet:  Carb Control 4 carbs/meal (1800kcals/day) and Dysphagia 1 pureed    Routes of Feeding: Oral  Liquids: Nectar Thick- no drinking straws  Daily Fluid Restriction: no  Last Modified Barium Swallow with Video (Video Swallowing Test): not done    Treatments at the Time of Hospital Discharge:   Respiratory Treatments: none  Oxygen Therapy:  is not on home oxygen therapy. Ventilator:    - No ventilator support    Rehab Therapies: Physical Therapy, Occupational Therapy and Speech/Language Therapy  Weight Bearing Status/Restrictions: No weight bearing restirctions  Other Medical Equipment (for information only, NOT a DME order):  Caroline Daniel with sling  Other Treatments: none      Patient's personal belongings (please select all that are sent with patient):  clothes    RN SIGNATURE:  Electronically signed by Mandi Estrada RN on 7/15/20 at 1:07 PM EDT    CASE MANAGEMENT/SOCIAL WORK SECTION    Inpatient Status Date: 7-6-2020    Readmission Risk Assessment Score:  Readmission Risk              Risk of Unplanned Readmission:        24           Discharging to [facility]  Phone: [phone]  Fax: [phone]      / signature:  Electronically signed by Lore Bernard RN on 7/14/2020 at 3:44 PM    PHYSICIAN SECTION    Prognosis: Good    Condition at Discharge: Stable    Rehab Potential (if transferring to Rehab): Good    Recommended Labs or Other Treatments After Discharge: BMP in 1 week- fax it to dr Eric bhat's office(nephrology)   use SSI only for now.  F/u BS closely and pt may need to be started back on lantus nightly dose    Physician Certification: I certify the above information and transfer of Tony Bitters  is necessary for the continuing treatment of the diagnosis listed and that she requires [facility] for less 30 days.      Update Admission H&P: No change in H&P    PHYSICIAN SIGNATURE:  Electronically signed by Alicia Johns MD on 7/14/20 at 3:36 PM EDT

## 2020-07-07 ENCOUNTER — APPOINTMENT (OUTPATIENT)
Dept: ULTRASOUND IMAGING | Age: 85
DRG: 871 | End: 2020-07-07
Payer: MEDICARE

## 2020-07-07 LAB
ANION GAP SERPL CALCULATED.3IONS-SCNC: 16 MMOL/L (ref 3–16)
ANION GAP SERPL CALCULATED.3IONS-SCNC: 17 MMOL/L (ref 3–16)
BASOPHILS ABSOLUTE: 0 K/UL (ref 0–0.2)
BASOPHILS RELATIVE PERCENT: 0 %
BUN BLDV-MCNC: 144 MG/DL (ref 7–20)
BUN BLDV-MCNC: 150 MG/DL (ref 7–20)
BUN BLDV-MCNC: 156 MG/DL (ref 7–20)
BUN BLDV-MCNC: 164 MG/DL (ref 7–20)
C DIFF TOXIN/ANTIGEN: NORMAL
CALCIUM SERPL-MCNC: 9.1 MG/DL (ref 8.3–10.6)
CALCIUM SERPL-MCNC: 9.2 MG/DL (ref 8.3–10.6)
CALCIUM SERPL-MCNC: 9.3 MG/DL (ref 8.3–10.6)
CALCIUM SERPL-MCNC: 9.4 MG/DL (ref 8.3–10.6)
CHLORIDE BLD-SCNC: 117 MMOL/L (ref 99–110)
CHLORIDE BLD-SCNC: 118 MMOL/L (ref 99–110)
CHLORIDE BLD-SCNC: 118 MMOL/L (ref 99–110)
CHLORIDE BLD-SCNC: 119 MMOL/L (ref 99–110)
CO2: 14 MMOL/L (ref 21–32)
CO2: 15 MMOL/L (ref 21–32)
CO2: 17 MMOL/L (ref 21–32)
CO2: 17 MMOL/L (ref 21–32)
CREAT SERPL-MCNC: 4.2 MG/DL (ref 0.6–1.2)
CREAT SERPL-MCNC: 4.3 MG/DL (ref 0.6–1.2)
CREAT SERPL-MCNC: 4.4 MG/DL (ref 0.6–1.2)
CREAT SERPL-MCNC: 4.5 MG/DL (ref 0.6–1.2)
EOSINOPHILS ABSOLUTE: 0 K/UL (ref 0–0.6)
EOSINOPHILS RELATIVE PERCENT: 0 %
GFR AFRICAN AMERICAN: 11
GFR AFRICAN AMERICAN: 11
GFR AFRICAN AMERICAN: 12
GFR AFRICAN AMERICAN: 12
GFR NON-AFRICAN AMERICAN: 10
GFR NON-AFRICAN AMERICAN: 10
GFR NON-AFRICAN AMERICAN: 9
GFR NON-AFRICAN AMERICAN: 9
GLUCOSE BLD-MCNC: 104 MG/DL (ref 70–99)
GLUCOSE BLD-MCNC: 113 MG/DL (ref 70–99)
GLUCOSE BLD-MCNC: 123 MG/DL (ref 70–99)
GLUCOSE BLD-MCNC: 131 MG/DL (ref 70–99)
GLUCOSE BLD-MCNC: 134 MG/DL (ref 70–99)
GLUCOSE BLD-MCNC: 134 MG/DL (ref 70–99)
GLUCOSE BLD-MCNC: 137 MG/DL (ref 70–99)
GLUCOSE BLD-MCNC: 140 MG/DL (ref 70–99)
GLUCOSE BLD-MCNC: 143 MG/DL (ref 70–99)
GLUCOSE BLD-MCNC: 151 MG/DL (ref 70–99)
GLUCOSE BLD-MCNC: 152 MG/DL (ref 70–99)
GLUCOSE BLD-MCNC: 185 MG/DL (ref 70–99)
GLUCOSE BLD-MCNC: 86 MG/DL (ref 70–99)
GLUCOSE BLD-MCNC: 93 MG/DL (ref 70–99)
GLUCOSE BLD-MCNC: 98 MG/DL (ref 70–99)
HCT VFR BLD CALC: 30.9 % (ref 36–48)
HEMOGLOBIN: 9.4 G/DL (ref 12–16)
LACTIC ACID: 1.5 MMOL/L (ref 0.4–2)
LEFT VENTRICULAR EJECTION FRACTION MODE: NORMAL
LV EF: 60 %
LV EF: 60 %
LVEF MODALITY: NORMAL
LYMPHOCYTES ABSOLUTE: 0.6 K/UL (ref 1–5.1)
LYMPHOCYTES RELATIVE PERCENT: 1.8 %
MAGNESIUM: 2.1 MG/DL (ref 1.8–2.4)
MAGNESIUM: 2.2 MG/DL (ref 1.8–2.4)
MCH RBC QN AUTO: 29.3 PG (ref 26–34)
MCHC RBC AUTO-ENTMCNC: 30.3 G/DL (ref 31–36)
MCV RBC AUTO: 96.9 FL (ref 80–100)
MONOCYTES ABSOLUTE: 0.6 K/UL (ref 0–1.3)
MONOCYTES RELATIVE PERCENT: 1.9 %
NEUTROPHILS ABSOLUTE: 31.7 K/UL (ref 1.7–7.7)
NEUTROPHILS RELATIVE PERCENT: 96.3 %
PDW BLD-RTO: 18.3 % (ref 12.4–15.4)
PERFORMED ON: ABNORMAL
PERFORMED ON: NORMAL
PERFORMED ON: NORMAL
PHOSPHORUS: 4.1 MG/DL (ref 2.5–4.9)
PHOSPHORUS: 4.2 MG/DL (ref 2.5–4.9)
PLATELET # BLD: 227 K/UL (ref 135–450)
PMV BLD AUTO: 9.3 FL (ref 5–10.5)
POTASSIUM SERPL-SCNC: 3.7 MMOL/L (ref 3.5–5.1)
POTASSIUM SERPL-SCNC: 4.2 MMOL/L (ref 3.5–5.1)
POTASSIUM SERPL-SCNC: 4.3 MMOL/L (ref 3.5–5.1)
POTASSIUM SERPL-SCNC: 4.8 MMOL/L (ref 3.5–5.1)
RBC # BLD: 3.19 M/UL (ref 4–5.2)
SODIUM BLD-SCNC: 150 MMOL/L (ref 136–145)
SODIUM BLD-SCNC: 152 MMOL/L (ref 136–145)
WBC # BLD: 33 K/UL (ref 4–11)

## 2020-07-07 PROCEDURE — 97162 PT EVAL MOD COMPLEX 30 MIN: CPT

## 2020-07-07 PROCEDURE — 93306 TTE W/DOPPLER COMPLETE: CPT

## 2020-07-07 PROCEDURE — 83605 ASSAY OF LACTIC ACID: CPT

## 2020-07-07 PROCEDURE — 2000000000 HC ICU R&B

## 2020-07-07 PROCEDURE — 97535 SELF CARE MNGMENT TRAINING: CPT

## 2020-07-07 PROCEDURE — 87324 CLOSTRIDIUM AG IA: CPT

## 2020-07-07 PROCEDURE — 2580000003 HC RX 258: Performed by: INTERNAL MEDICINE

## 2020-07-07 PROCEDURE — 97530 THERAPEUTIC ACTIVITIES: CPT

## 2020-07-07 PROCEDURE — 36592 COLLECT BLOOD FROM PICC: CPT

## 2020-07-07 PROCEDURE — 87040 BLOOD CULTURE FOR BACTERIA: CPT

## 2020-07-07 PROCEDURE — 87449 NOS EACH ORGANISM AG IA: CPT

## 2020-07-07 PROCEDURE — 76770 US EXAM ABDO BACK WALL COMP: CPT

## 2020-07-07 PROCEDURE — 83735 ASSAY OF MAGNESIUM: CPT

## 2020-07-07 PROCEDURE — 6360000002 HC RX W HCPCS: Performed by: HOSPITALIST

## 2020-07-07 PROCEDURE — 6360000002 HC RX W HCPCS: Performed by: PHYSICIAN ASSISTANT

## 2020-07-07 PROCEDURE — 6360000002 HC RX W HCPCS: Performed by: INTERNAL MEDICINE

## 2020-07-07 PROCEDURE — 99223 1ST HOSP IP/OBS HIGH 75: CPT | Performed by: INTERNAL MEDICINE

## 2020-07-07 PROCEDURE — 94760 N-INVAS EAR/PLS OXIMETRY 1: CPT

## 2020-07-07 PROCEDURE — C9113 INJ PANTOPRAZOLE SODIUM, VIA: HCPCS | Performed by: PHYSICIAN ASSISTANT

## 2020-07-07 PROCEDURE — 2580000003 HC RX 258: Performed by: HOSPITALIST

## 2020-07-07 PROCEDURE — 97166 OT EVAL MOD COMPLEX 45 MIN: CPT

## 2020-07-07 PROCEDURE — 84100 ASSAY OF PHOSPHORUS: CPT

## 2020-07-07 PROCEDURE — 99232 SBSQ HOSP IP/OBS MODERATE 35: CPT | Performed by: INTERNAL MEDICINE

## 2020-07-07 PROCEDURE — 80048 BASIC METABOLIC PNL TOTAL CA: CPT

## 2020-07-07 PROCEDURE — 87081 CULTURE SCREEN ONLY: CPT

## 2020-07-07 PROCEDURE — 36415 COLL VENOUS BLD VENIPUNCTURE: CPT

## 2020-07-07 PROCEDURE — 85025 COMPLETE CBC W/AUTO DIFF WBC: CPT

## 2020-07-07 RX ORDER — SODIUM CHLORIDE 450 MG/100ML
INJECTION, SOLUTION INTRAVENOUS CONTINUOUS
Status: DISCONTINUED | OUTPATIENT
Start: 2020-07-07 | End: 2020-07-10

## 2020-07-07 RX ORDER — PANTOPRAZOLE SODIUM 40 MG/10ML
40 INJECTION, POWDER, LYOPHILIZED, FOR SOLUTION INTRAVENOUS 2 TIMES DAILY
Status: DISCONTINUED | OUTPATIENT
Start: 2020-07-07 | End: 2020-07-15 | Stop reason: HOSPADM

## 2020-07-07 RX ADMIN — PANTOPRAZOLE SODIUM 40 MG: 40 INJECTION, POWDER, FOR SOLUTION INTRAVENOUS at 20:13

## 2020-07-07 RX ADMIN — PANTOPRAZOLE SODIUM 40 MG: 40 INJECTION, POWDER, FOR SOLUTION INTRAVENOUS at 14:04

## 2020-07-07 RX ADMIN — POTASSIUM CHLORIDE 10 MEQ: 7.46 INJECTION, SOLUTION INTRAVENOUS at 03:04

## 2020-07-07 RX ADMIN — SODIUM CHLORIDE: 4.5 INJECTION, SOLUTION INTRAVENOUS at 20:15

## 2020-07-07 RX ADMIN — INSULIN LISPRO 2 UNITS: 100 INJECTION, SOLUTION INTRAVENOUS; SUBCUTANEOUS at 05:57

## 2020-07-07 RX ADMIN — LINEZOLID 600 MG: 600 INJECTION, SOLUTION INTRAVENOUS at 03:55

## 2020-07-07 RX ADMIN — PIPERACILLIN AND TAZOBACTAM 3.38 G: 3; .375 INJECTION, POWDER, LYOPHILIZED, FOR SOLUTION INTRAVENOUS at 22:42

## 2020-07-07 RX ADMIN — SODIUM CHLORIDE: 4.5 INJECTION, SOLUTION INTRAVENOUS at 08:58

## 2020-07-07 RX ADMIN — Medication 10 ML: at 20:14

## 2020-07-07 RX ADMIN — POTASSIUM CHLORIDE 10 MEQ: 7.46 INJECTION, SOLUTION INTRAVENOUS at 01:34

## 2020-07-07 RX ADMIN — PIPERACILLIN AND TAZOBACTAM 2.25 G: 2; .25 INJECTION, POWDER, LYOPHILIZED, FOR SOLUTION INTRAVENOUS at 03:03

## 2020-07-07 RX ADMIN — PIPERACILLIN AND TAZOBACTAM 3.38 G: 3; .375 INJECTION, POWDER, LYOPHILIZED, FOR SOLUTION INTRAVENOUS at 14:10

## 2020-07-07 RX ADMIN — INSULIN LISPRO 2 UNITS: 100 INJECTION, SOLUTION INTRAVENOUS; SUBCUTANEOUS at 14:27

## 2020-07-07 RX ADMIN — HEPARIN SODIUM 5000 UNITS: 5000 INJECTION INTRAVENOUS; SUBCUTANEOUS at 05:57

## 2020-07-07 RX ADMIN — Medication 10 ML: at 20:13

## 2020-07-07 RX ADMIN — HEPARIN SODIUM 5000 UNITS: 5000 INJECTION INTRAVENOUS; SUBCUTANEOUS at 21:32

## 2020-07-07 RX ADMIN — INSULIN LISPRO 2 UNITS: 100 INJECTION, SOLUTION INTRAVENOUS; SUBCUTANEOUS at 18:36

## 2020-07-07 RX ADMIN — Medication 10 ML: at 09:05

## 2020-07-07 ASSESSMENT — PAIN SCALES - WONG BAKER
WONGBAKER_NUMERICALRESPONSE: 0

## 2020-07-07 ASSESSMENT — PAIN SCALES - GENERAL
PAINLEVEL_OUTOF10: 0

## 2020-07-07 NOTE — PROGRESS NOTES
response, occasional head nod with max stimulation. Vitals seated in recliner: 133/45, 97%, 102 BPM       Patient Diagnosis(es): The primary encounter diagnosis was CYNTHIA (acute kidney injury) ([facility] [address] Diagnoses of Uremia, Septicemia ([address] and Suspected COVID-19 virus infection were also pertinent to this visit. has a past medical history of Arthritis, Chronic kidney disease, Constipation, Dementia ([address] Depression, Diabetes mellitus ([address] GERD (gastroesophageal reflux disease), Glaucoma, Hyperlipidemia, Hypertension, MI (myocardial infarction) ([address] Muscle weakness (generalized), Unspecified cerebral artery occlusion with cerebral infarction, and UTI (lower urinary tract infection). has a past surgical history that includes Appendectomy; Cholecystectomy; Breast surgery; Colonoscopy; Endoscopy, colon, diagnostic; eye surgery; joint replacement; Dilatation, esophagus; Hysterectomy; Tonsillectomy; Upper gastrointestinal endoscopy (3/18/15); and Sigmoidoscopy (3/18/15). Restrictions  Restrictions/Precautions  Restrictions/Precautions: Contact Precautions, Fall Risk(c-diff; high fall risk)  Position Activity Restriction  Other position/activity restrictions: This is a pleasant 80 y.o. female with history of dementia (without behavioral disturbance), diabetes type 2 with hyperglycemia, gastroesophageal reflux disease, hyperlipidemia, essential hypertension, history of CVA, CKD stage III (baseline creatinine 1.2-1.3), who presents from nursing home with reports that patient was found unresponsive in bed. It is unclear how long patient had been unresponsive, suspected to have been anywhere from 1 to 1.5 hours prior to the time he was found. Paramedics were called and patient was brought to the emergency room, noted to respond to painful stimuli with eyes open in the emergency room, moving all extremities. He did not have much improvement with Narcan.   Paramedics also report some abnormal ST depression on telemetry prior to arrival in the emergency room. Vision/Hearing  Vision: (Unable to formally test vision secondary to level of arousal)  Hearing: Within functional limits(eyes consistently open to verbal stimulation. Unable to formally test secondary to lack of verbal response)       Subjective  General  Chart Reviewed: Yes  Patient assessed for rehabilitation services?: Yes  Family / Caregiver Present: No  Referral Date : 07/06/20  Diagnosis: Acute Kidney Injury  Follows Commands: Within Functional Limits  General Comment  Comments: Patient supine in bed upon arrival on room air. Subjective  Subjective: Patient non verbal throughout session. Pain Screening  Patient Currently in Pain: No     Orientation  Orientation  Overall Orientation Status: Impaired  Orientation Level: Unable to assess    Social/Functional History  Social/Functional History  Type of Home: Facility([facility])  ADL Assistance: Needs assistance  Homemaking Assistance: Needs assistance  Ambulation Assistance: Needs assistance  Transfer Assistance: Needs assistance  Additional Comments: Patient non verbal, unable to provided detailed report on baseline level. Cognition   Cognition  Overall Cognitive Status: Exceptions  Arousal/Alertness: Inconsistent responses to stimuli  Following Commands: Does not follow commands  Cognition Comment: non verbal, pt opens eyes when name called. inconsistent and minimal yes-nodding    Objective  Observation/Palpation  Observation: Patient non verbal throughout session. No AROM to stimulation in supine position. Brief (R) LE muscle activation in response to verbal commands upon initial transition to seated position but quickly returns to inability to follow commands.     PROM RLE (degrees)  RLE PROM: Exceptions  RLE General PROM: (R) ankle lacking ~ 20* DF from neutral position  PROM LLE (degrees)  LLE PROM: Exceptions  LLE General PROM: (L) ankle lacking ~ 30* DF from neutral position  Strength Minutes   Total Treatment Time: 39 Minutes     co-tx completed on this date to maximize functional mobility and maintain patient safety    Saul Moser PT, DPT - [id number]

## 2020-07-07 NOTE — CONSULTS
Gastroenterology Consult Note      Patient: Zamzam Zamudio  : 1933  Acct#:      Date:  2020    Subjective:       History of Present Illness  Patient is a 80 y.o.  female admitted with Acute kidney injury ([facility] [address] [N17.9]  Acute kidney injury ([facility] [address] [N17.9]  DKA, type 1, not at goal [facility]) [E10.10] who is seen in consult for melena. H/o dementia  And cannot provide any history. She was admitted with altered mental status, CNYTHIA on CKD, sepsis from [facility] UTI. She had a black stool today. No vomiting. She is on PO iron outpatient as well as daily prilosec. Past Medical History:   Diagnosis Date    Arthritis     Chronic kidney disease     Constipation     Dementia ([facility] [address] [facility]     Depression     Diabetes mellitus (HCC)     GERD (gastroesophageal reflux disease)     Glaucoma     Hyperlipidemia     Hypertension     MI (myocardial infarction) ([facility] [address] [facility]     Muscle weakness (generalized)     Unspecified cerebral artery occlusion with cerebral infarction     UTI (lower urinary tract infection)       Past Surgical History:   Procedure Laterality Date    APPENDECTOMY      BREAST SURGERY      CHOLECYSTECTOMY      COLONOSCOPY      DILATATION, ESOPHAGUS      ENDOSCOPY, COLON, DIAGNOSTIC      EYE SURGERY      HYSTERECTOMY      JOINT REPLACEMENT      SIGMOIDOSCOPY  3/18/15    TONSILLECTOMY      UPPER GASTROINTESTINAL ENDOSCOPY  3/18/15      Past Endoscopic History:  Colonoscopy with Dr Mary Jane Aguayo 2018 for h/o polyps     POSTOPERATIVE DIAGNOSES:  1. Ascending colon polyps. 2.  Transverse colon polyps. 3.  Rectal polyps. EGD with Dr Ellin Hodgkins  for GI bleed  Impression:  -Normal upper endoscopy, with no endoscopic evidence of neoplasia or mucosal abnormality. Admission Meds  No current facility-administered medications on file prior to encounter.       Current Outpatient Medications on File Prior to Encounter   Medication Sig Dispense Refill    psyllium for bp less than 110/60      insulin detemir (LEVEMIR) 100 UNIT/ML injection Inject 18 Units into the skin nightly.  TRAZODONE HCL Take 25 mg by mouth nightly       atorvastatin (LIPITOR) 40 MG tablet Take 40 mg by mouth daily.  DULoxetine (CYMBALTA) 30 MG capsule Take 30 mg by mouth daily. Allergies  No Known Allergies   Social   Social History     Tobacco Use    Smoking status: Former Smoker     Packs/day: 0.25     Years: 15.00     Pack years: 3.75    Smokeless tobacco: Never Used   Substance Use Topics    Alcohol use: No        History reviewed. No pertinent family history. Review of Systems  Review of systems not obtained due to patient factors. Physical Exam  Blood pressure (!) 126/54, pulse 110, temperature 97.4 °F (36.3 °C), temperature source Axillary, resp. rate 16, height 5' 5\" (1.651 m), weight 165 lb 9.1 oz (75.1 kg), SpO2 98 %, not currently breastfeeding. General appearance: opens eyes, does not follow commands, no distress, appears stated age  Eyes: Anicteric  Head: Normocephalic, without obvious abnormality  Lungs: clear to auscultation bilaterally, Normal Effort  Heart: regular rate and rhythm, normal S1 and S2, no murmurs or rubs  Abdomen: soft, non-tender. Bowel sounds normal. No masses,  no organomegaly.    Extremities: atraumatic, no cyanosis or edema  Skin: warm and dry, no jaundice  Neuro: awakens   Musculoskeletal: no muscle wasting      Data Review:    Recent Labs     07/05/20 2154 07/06/20  0438 07/06/20  0838   WBC 26.1* 30.6* 34.5*   HGB 12.0 11.5* 11.5*   HCT 38.9 37.2 39.5   MCV 92.6 96.1 99.8    273 278     Recent Labs     07/06/20 2133 07/07/20  0059 07/07/20  0500   * 152* 150*   K 4.1 3.7 4.8   * 118* 117*   CO2 15* 17* 17*   PHOS 5.1* 4.2 4.1   * 164* 156*   CREATININE 4.5* 4.5* 4.4*     Recent Labs     07/05/20 2154 07/06/20  0438   AST 52* 46*   ALT 18 18   BILITOT 0.3 0.4   ALKPHOS 89 122     No results for input(s): LIPASE, AMYLASE in the last 72 hours. Recent Labs     20  2154   PROTIME 13.3*   INR 1.14     No results for input(s): PTT in the last 72 hours. No results for input(s): OCCULTBLD in the last 72 hours. Assessment:     Active Problems:    Acute kidney injury ([address]    DKA, type 1, not at goal [facility])  Resolved Problems:    * No resolved hospital problems. *    Black stool - she is on iron outpatient which can cause black stools. Awaiting repeat hgb. Sepsis - E.coli UTI. CYNTHIA on CKD    Recommendations:   - Protonix 40 mg IV BID  - repeat H/H  - monitor for GI bleeding    Discussed with Dr. Jamaica Russell, PA-C  [address]    I have personally performed a face to face diagnostic evaluation on this patient. I have interviewed and examined the patient and I agree with the findings and recommended plan of care. In summary, my findings and plan are the followin/F with dementia, CYNTHIA on CKD, E.coli urosepsis. GI was consulted for black stools for 1 day. She is on oral iron at home. No abdominal pain. VSS abdomen soft and nontender. Hb dropped from 11.5 --> 9.4  A/P> Melena and anemia. She could have an upper GI bleed. With her advanced age and comorbidities, we will manage her conservatively. IV PPI, monitor H/H and for further bleeding. Depending on clinical course, we can potentially do an EGD tomorrow (if patient/family consent to the procedure).      Perez Navarro MD, MSc  [address]

## 2020-07-07 NOTE — PROGRESS NOTES
notified; Left in chair;Call light within reach; Chair alarm in place; All fall risk precautions in place           Patient Diagnosis(es): The primary encounter diagnosis was CYNTHIA (acute kidney injury) ([facility] [address] Diagnoses of Uremia, Septicemia ([address] and Suspected COVID-19 virus infection were also pertinent to this visit. has a past medical history of Arthritis, Chronic kidney disease, Constipation, Dementia ([facility] [address] Depression, Diabetes mellitus ([address] GERD (gastroesophageal reflux disease), Glaucoma, Hyperlipidemia, Hypertension, MI (myocardial infarction) ([facility] [address] Muscle weakness (generalized), Unspecified cerebral artery occlusion with cerebral infarction, and UTI (lower urinary tract infection). has a past surgical history that includes Appendectomy; Cholecystectomy; Breast surgery; Colonoscopy; Endoscopy, colon, diagnostic; eye surgery; joint replacement; Dilatation, esophagus; Hysterectomy; Tonsillectomy; Upper gastrointestinal endoscopy (3/18/15); and Sigmoidoscopy (3/18/15). Treatment Diagnosis: multiple performance deficits associated w/ renal failure and AMS      Restrictions  Restrictions/Precautions  Restrictions/Precautions: Contact Precautions, Fall Risk(c-diff; high fall risk)  Position Activity Restriction  Other position/activity restrictions: This is a pleasant 80 y.o. female with history of dementia (without behavioral disturbance), diabetes type 2 with hyperglycemia, gastroesophageal reflux disease, hyperlipidemia, essential hypertension, history of CVA, CKD stage III (baseline creatinine 1.2-1.3), who presents from nursing home with reports that patient was found unresponsive in bed. It is unclear how long patient had been unresponsive, suspected to have been anywhere from 1 to 1.5 hours prior to the time he was found. Paramedics were called and patient was brought to the emergency room, noted to respond to painful stimuli with eyes open in the emergency room, moving all extremities.   He did not have much improvement with Narcan. Paramedics also report some abnormal ST depression on telemetry prior to arrival in the emergency room. Subjective   General  Chart Reviewed: Yes  Patient assessed for rehabilitation services?: Yes  Family / Caregiver Present: No  Diagnosis: renal failure; AMS  Subjective  Subjective: RN okay for therapy reporting MD requesting increased stimuli and okay for getting OOB. Pt w/ no vocalizations and very limited responses, opening eyes for name and inconsistently nodding yes/no to questions. Patient Currently in Pain: No  Pain Assessment  Pain Assessment: Faces(Simultaneous filing. User may not have seen previous data.)  Hilario-Baker Pain Rating: No hurt(Simultaneous filing. User may not have seen previous data.)  Vital Signs  Patient Currently in Pain: No    Social/Functional History  Social/Functional History  Type of Home: Facility[facility])  ADL Assistance: Needs assistance  Homemaking Assistance: Needs assistance  Ambulation Assistance: Needs assistance  Transfer Assistance: Needs assistance  Additional Comments: Patient non verbal, unable to provided detailed report on baseline level. Objective           Observation/Palpation  Observation: Patient non verbal throughout session. No AROM to stimulation in supine position. Brief (R) LE muscle activation in response to verbal commands upon initial transition to seated position but quickly returns to inability to follow commands. Balance  Sitting Balance: Unable to assess(comment)(unsafe to attempt EOB d/t arousal level)  Functional Mobility  Assist Level: Dependent/Total  Functional Mobility Comments: use of maxi sunny lift for bed to recliner  ADL  UE Bathing: Dependent/Total  Toileting: Dependent/Total(2 person assist)  Additional Comments: bed level fito hygiene and brief change- pt incontinent of BM. fp;ey cath presents. TD for washing arms w/ warm wipes bed level.    Tone RUE  RUE Tone: Hypotonic  Tone LUE  LUE Tone: (appears normotonic w/ exception of minor tone in hand and elbow)  Coordination  Movements Are Fluid And Coordinated: No  Quality of Movement Other  Comment: pt with limited responses to stimuli and very limitied purposeful movements at this time. Transfers  Transfer Comments: use of maxi-sunny for EOB to recliner     Cognition  Overall Cognitive Status: Exceptions  Arousal/Alertness: Inconsistent responses to stimuli  Following Commands: Does not follow commands  Cognition Comment: non verbal, pt opens eyes when name called.  inconsistent and minimal yes-nodding        Sensation  Overall Sensation Status: Impaired(unable to foramlly assess d/t cognition/arousal)        LUE PROM (degrees)  LUE PROM: Exceptions  LUE General PROM: shoulder ROM ~100*  Left Hand PROM (degrees)  Left Hand PROM: WFL  RUE PROM (degrees)  RUE PROM: Exceptions  Right Hand PROM (degrees)  Right Hand PROM: Exceptions  Right Hand General PROM: unable to achieve full extension of digits at PIPs/DIPs  LUE Strength  LUE Strength Comment: unable to foramlly assess d/t level of arousal   RUE Strength  RUE Strength Comment: unable to foramlly assess d/t level of arousal                    Plan   Plan  Times per week: 3-5x  Times per day: Daily  Specific instructions for Next Treatment: cotx  Current Treatment Recommendations: Strengthening, Cognitive Reorientation, Functional Mobility Training, Self-Care / ADL, Positioning, Neuromuscular Re-education, ROM      AM-PAC Score        -[facility] Inpatient Daily Activity Raw Score: 6 (07/07/20 1201)  AM-PAC Inpatient ADL T-Scale Score : 17.07 (07/07/20 1201)  ADL Inpatient CMS 0-100% Score: 100 (07/07/20 1201)  ADL Inpatient CMS G-Code Modifier : CN (07/07/20 1201)    Goals  Short term goals  Time Frame for Short term goals: discharge  Short term goal 1: Grooming w/ ModA  Short term goal 2: EOB sitting w/ ModA for functional task  Short term goal 3: EOB sitting for 5+mins during functional

## 2020-07-07 NOTE — CARE COORDINATION
Discharge planning note:    Patient is in ICU on IV Zosyn at this time. Consults with Pulmonary & Nephrology. Patient is from [facility] and can return there at discharge.     Raheel Noble RN BSN  Case Management  [phone]

## 2020-07-07 NOTE — PLAN OF CARE
Problem: Falls - Risk of:  Goal: Will remain free from falls  Description: Will remain free from falls  7/7/2020 1941 by Yan Aguayo RN  Outcome: Ongoing  7/7/2020 0953 by Shelah Bosworth, RN  Outcome: Ongoing  Goal: Absence of physical injury  Description: Absence of physical injury  7/7/2020 1941 by Yan Aguayo RN  Outcome: Ongoing  7/7/2020 0953 by Shelah Bosworth, RN  Outcome: Ongoing     Problem: Skin Integrity:  Goal: Will show no infection signs and symptoms  Description: Will show no infection signs and symptoms  7/7/2020 1941 by Yan Aguayo RN  Outcome: Ongoing  7/7/2020 0953 by Shelah Bosworth, RN  Outcome: Ongoing  Goal: Absence of new skin breakdown  Description: Absence of new skin breakdown  7/7/2020 1941 by Yan Aguayo RN  Outcome: Ongoing  7/7/2020 0953 by Shelah Bosworth, RN  Outcome: Ongoing     Problem: Confusion - Acute:  Goal: Absence of continued neurological deterioration signs and symptoms  Description: Absence of continued neurological deterioration signs and symptoms  7/7/2020 1941 by Yan Aguayo RN  Outcome: Ongoing  7/7/2020 0953 by Shelah Bosworth, RN  Outcome: Ongoing  Goal: Mental status will be restored to baseline  Description: Mental status will be restored to baseline  7/7/2020 1941 by Yan Aguayo RN  Outcome: Ongoing  7/7/2020 0953 by Shelah Bosworth, RN  Outcome: Ongoing     Problem: Discharge Planning:  Goal: Ability to perform activities of daily living will improve  Description: Ability to perform activities of daily living will improve  7/7/2020 1941 by Yan Aguayo RN  Outcome: Ongoing  7/7/2020 0953 by Shelah Bosworth, RN  Outcome: Ongoing  Goal: Participates in care planning  Description: Participates in care planning  7/7/2020 1941 by Yan Aguayo RN  Outcome: Ongoing  7/7/2020 0953 by Shelah Bosworth, RN  Outcome: Ongoing     Problem: Injury - Risk of, Physical Injury:  Goal: Will remain free from falls  Description: Will remain free from Adryan Cui RN  Outcome: Ongoing  7/7/2020 0953 by Swapnil Reeves RN  Outcome: Ongoing  Goal: Demonstrates accurate environmental perceptions  Description: Demonstrates accurate environmental perceptions  7/7/2020 1941 by Hipolito Greenberg RN  Outcome: Ongoing  7/7/2020 0953 by Swapnil Reeves RN  Outcome: Ongoing  Goal: Able to distinguish between reality-based and nonreality-based thinking  Description: Able to distinguish between reality-based and nonreality-based thinking  7/7/2020 1941 by Hipolito Greenberg RN  Outcome: Ongoing  7/7/2020 0953 by Swapnil Reeves RN  Outcome: Ongoing  Goal: Able to interrupt nonreality-based thinking  Description: Able to interrupt nonreality-based thinking  7/7/2020 1941 by Hipolito Greenberg RN  Outcome: Ongoing  7/7/2020 0953 by Swapnil Reeves RN  Outcome: Ongoing     Problem: Sleep Pattern Disturbance:  Goal: Appears well-rested  Description: Appears well-rested  7/7/2020 1941 by Hipolito Greenberg RN  Outcome: Ongoing  7/7/2020 0953 by Swapnil Reeves RN  Outcome: Ongoing

## 2020-07-07 NOTE — PROGRESS NOTES
Pt up in chair per PT/OT  Reassessed, no acute changes. Pt now following simple commands, lightly squeezing with hands & following commands. VSS  Monitor NSR  Lungs remain diminished. Nods head no when asked if having pain. See flow sheets for complete assessment.

## 2020-07-07 NOTE — PROGRESS NOTES
Back from US  Pt reassessed. Alert to name, following simple commands. VSS  Monitor ST  Lungs remain diminished t/o. Nods head no when asked if having any pain. See flow sheets for complete assessment.

## 2020-07-07 NOTE — FLOWSHEET NOTE
Dr Laura Pearce updated on latest BUN and creatinine, as well as K+ of 4.1 which per DKA protocol would require 30 meq KCL to be given. Will monitor for now and if next K+ is less than 4 will given replacement then.

## 2020-07-07 NOTE — CONSULTS
P Pulmonary and Critical Care   Consult Note      Reason for Consult: DKA, renal failure  Requesting Physician: Dr Cyn Day:   [address] [facility] / HPI:                The patient is a 80 y.o. female with significant past medical history of:      Diagnosis Date    Arthritis     Chronic kidney disease     Constipation     Dementia ([facility] [address]     Depression     [facility]     GERD (gastroesophageal reflux disease)     Glaucoma     Hyperlipidemia     Hypertension     MI [facility]     Muscle weakness (generalized)     Unspecified cerebral artery occlusion with cerebral infarction     UTI (lower urinary tract infection)      Interval history: Patient remains unresponsive. Blood sugar is better. Kidney function is poor but stable. .    Past Surgical History:        Procedure Laterality Date    APPENDECTOMY      BREAST SURGERY      CHOLECYSTECTOMY      COLONOSCOPY      DILATATION, ESOPHAGUS      ENDOSCOPY, COLON, DIAGNOSTIC      EYE SURGERY      HYSTERECTOMY      JOINT REPLACEMENT      SIGMOIDOSCOPY  3/18/15    TONSILLECTOMY      UPPER GASTROINTESTINAL ENDOSCOPY  3/18/15     Current Medications:    Current Facility-Administered Medications: 0.45 % sodium chloride infusion, , Intravenous, Continuous  aspirin EC tablet 81 mg, 81 mg, Oral, Daily  atorvastatin (LIPITOR) tablet 40 mg, 40 mg, Oral, Daily  bisacodyl (DULCOLAX) suppository 10 mg, 10 mg, Rectal, Daily PRN  Vitamin D (CHOLECALCIFEROL) tablet 1,000 Units, 1,000 Units, Oral, Daily  [Held by provider] donepezil (ARICEPT) tablet 10 mg, 10 mg, Oral, Nightly  [Held by provider] DULoxetine (CYMBALTA) extended release capsule 30 mg, 30 mg, Oral, Daily  metoprolol tartrate (LOPRESSOR) tablet 25 mg, 25 mg, Oral, BID  calcium elemental (OSCAL) tablet 500 mg, 500 mg, Oral, Daily  vitamin B-12 (CYANOCOBALAMIN) tablet 50 mcg, 50 mcg, Oral, Daily  albuterol sulfate  (90 Base) MCG/ACT inhaler 2 puff, 2 puff, Intravenous, 2 times per day  sodium chloride flush 0.9 % injection 10 mL, 10 mL, Intravenous, PRN  aspirin chewable tablet 324 mg, 324 mg, Oral, Once    No Known Allergies    Social History:    TOBACCO:   reports that she has quit smoking. She has a 3.75 pack-year smoking history. She has never used smokeless tobacco.  ETOH:   reports no history of alcohol use. Patient currently lives independently  Environmental/chemical exposure: None known    Family History:   History reviewed. No pertinent family history. REVIEW OF SYSTEMS:    ALLYN is unobtainable due to his critical illness. Objective:   PHYSICAL EXAM:      VITALS:  BP (!) 126/54   Pulse 110   Temp 97.4 °F (36.3 °C) (Axillary)   Resp 16   Ht 5' 5\" (1.651 m)   Wt 165 lb 9.1 oz (75.1 kg)   SpO2 98%   BMI 27.55 kg/m²      24HR INTAKE/OUTPUT:      Intake/Output Summary (Last 24 hours) at 7/7/2020 1033  Last data filed at 7/7/2020 5375  Gross per 24 hour   Intake 4873 ml   Output 875 ml   Net 3998 ml     CONSTITUTIONAL: Awake canal appears arousable   nECK:  Supple, symmetrical, trachea midline, no adenopathy, thyroid symmetric, not enlarged and no tenderness, skin normal  LUNGS:  no increased work of breathing and clear to auscultation. No accessory muscle use  CARDIOVASCULAR: S1 and S2, no edema and no JVD  ABDOMEN:  normal bowel sounds, non-distended and no masses palpated, and no tenderness to palpation. No hepatospleenomegaly  LYMPHADENOPATHY:  no axillary or supraclavicular adenopathy. No cervical adnenopathy  PSYCHIATRIC: Encephalopathic. MUSCULOSKELETAL: No obvious misalignment or effusion of the joints. No clubbing, cyanosis of the digits. SKIN:  normal skin color, texture, turgor and no redness, warmth, or swelling.  No palpable nodules    DATA:    Old records have been reviewed    CBC:  Recent Labs     07/05/20  2154 07/06/20  0438 07/06/20  0838   WBC 26.1* 30.6* 34.5*   RBC 4.21 3.87* 3.96*   HGB 12.0 11.5* 11.5*   HCT 38.9 37.2 39.5  273 278   MCV 92.6 96.1 99.8   MCH 28.6 29.6 29.2   MCHC 30.9* 30.8* 29.3*   RDW 17.5* 17.6* 18.2*      BMP:  Recent Labs     07/06/20  2133 07/07/20  0059 07/07/20  0500   * 152* 150*   K 4.1 3.7 4.8   * 118* 117*   CO2 15* 17* 17*   * 164* 156*   CREATININE 4.5* 4.5* 4.4*   CALCIUM 9.3 9.3 9.4   GLUCOSE 212* 134* 93      ABG:  No results for input(s): PHART, QNA5MWJ, PO2ART, KOV4SNU, K9BFGJTL, BEART in the last 72 hours. No results found for: BNP  Lab Results   Component Value Date    CKTOTAL 1,552 (H) 07/06/2020    TROPONINI 0.22 (H) 07/06/2020       Cultures:     Abx:    Radiology Review:  All pertinent images / reports were reviewed as a part of this visit. Assessment:     1. Acute encephalopathy  · I have reviewed laboratories, medical records and images for this visit  · Most likely metabolic with blood sugar that has gotten as high as 600, creatinine of 5 and pH 7.32.  · Also the possibility of sepsis exists as her procalcitonin is elevated and her white count is markedly elevated. · She did have fever as high as 102.6 degrees  · Serum lactate is also elevated at 3.3 and improving. 2.  Severe sepsis  · Hemodynamically stable  · Growing E. coli from urine  · Can stop Zyvox    3. Acute on chronic kidney disease  · Creatinine down to 4.4  · She is making some urine  · Nephrology is following  · Sodium is up to 150  · Likely needs a bit of free water.     3.  DKA  · Not in DKA  · Sliding scale insulin    I will sign off

## 2020-07-07 NOTE — PLAN OF CARE
Problem: Falls - Risk of:  Goal: Will remain free from falls  Description: Will remain free from falls  Outcome: Ongoing   Fall risk protocol in place  Traceell Fell fall risk assessed q shift      Problem: Skin Integrity:  Goal: Will show no infection signs and symptoms  Description: Will show no infection signs and symptoms  Outcome: Ongoing  Wound prevention protocol in place  Walt assessed q shift

## 2020-07-07 NOTE — CONSULTS
Infectious Diseases   Consult Note        Admission Date: 7/5/2020  Hospital Day: Hospital Day: 3   Attending: Gillian Paulino MD  Date of service: 7/7/20     Reason for admission: Acute kidney injury [facility]) [N17.9]  Acute kidney injury ([facility] [N17.9]  DKA, type 1, not at goal [facility]) [E10.10]    Chief complaint/ Reason for consult: Severe sepsis, complicated E. coli UTI    Microbiology:        I have reviewed allavailable micro lab data and cultures    · Blood culture (2/2) - collected on 7/5/2020: In process    · Urine culture  - collected on 7/6/2020: 50,000 CFU per mL of E. coli      Antibiotics and immunizations:       Current antibiotics: All antibiotics and their doses were reviewed by me    Recent Abx Admin                   piperacillin-tazobactam (ZOSYN) 3.375 g in dextrose 5 % 50 mL IVPB extended infusion (mini-bag) (g) 3.375 g New Bag 07/07/20 1410    linezolid (ZYVOX) IVPB 600 mg (mg) 600 mg New Bag 07/07/20 0355    piperacillin-tazobactam (ZOSYN) 2.25 g in dextrose 5 % 50 mL IVPB (mini-bag) (g) 2.25 g New Bag 07/07/20 0303     2.25 g New Bag 07/06/20 1907                  Immunization History: All immunization history was reviewed by me today. There is no immunization history on file for this patient. Known drug allergies: All allergies were reviewed and updated    No Known Allergies    Social history:     Social History:  All social andepidemiologic history was reviewed and updated by me today as needed. · Tobacco use:   reports that she has quit smoking. She has a 3.75 pack-year smoking history. She has never used smokeless tobacco.  · Alcohol use:   reports no history of alcohol use. · Currently lives in: [address] [place] [address] Road  ·  reports no history of drug use.        Assessment:     The patient is a 80 y.o. old female who  has a past medical history of Arthritis, Chronic kidney disease, Constipation, Dementia ([facility] Depression, Diabetes mellitus ([facility] GERD (gastroesophageal reflux disease), Glaucoma, Hyperlipidemia, Hypertension, MI (myocardial infarction) ([address] Muscle weakness (generalized), Unspecified cerebral artery occlusion with cerebral infarction, and UTI (lower urinary tract infection). with following problems:    · Severe sepsis with high fever, metabolic encephalopathy, acute renal failure  · Complicated E. coli UTI  · Right renal cyst on ultrasound  · Hypernatremia  · History of stroke  · Nursing home resident  · Essential hypertension  · Mixed hyperlipidemia  · History of depression  · Diarrhea      Discussion:      Her blood cultures from 7/5/2020 are negative so far. Urine culture is growing 50,000 CFU per mL of E. coli, susceptibilities pending. The patient has been on IV linezolid and IV Zosyn. White cell count is 33,000 today. It was 34,500 yesterday    She had a COVID 19 PCR test done on 7/5/2020, which was negative    Plan:     Diagnostic Workup:    · Follow-up on E. coli susceptibility  · Follow up on liver and renal function  · Continue to follow fever curve, WBC count and blood cultures  · Follow up on liverand renal functions closely  · Follow-up on stool for C. difficile   · Monitor hemoglobin hematocrit  · Repeat portable chest x-ray tomorrow  · Nasal MRSA screen    Antimicrobials:    · Will continue IV Zosyn 3.375 g at renal adjusted dose of every 12 hours  · Continue to monitor her vitals closely  · Will we will follow-up on E. coli susceptibility data and will make further changes in the antibiotics accordingly  · Continue to watch for new fever or diarrhea  · Aspiration precaution  · Fall precaution  · Maintain good glycemic control  · Had some melena.   GI following  · DVT prophylaxis  · Discussed the above plan with ICU RN       Drug Monitoring:    · Continue serial monitoring for antibiotic toxicity as follows: CBC, CMP  · Continue to watch for following: new or worsening fever, hypotension, hives, lip swelling and redness or purulence at vascular access sites.    I/v access Management:    · Continue to monitor i.v access sites for erythema, induration, discharge or tenderness. · As always, continue efforts to minimizetubes/lines/drains as clinically appropriate to reduce chances of line associated infections. Current isolation precautions:    Currently active isolation(s): C Diff Contact       Level of complexity of consult: High     Risk of Complications/Morbidity: High     · Illness(es)/ Infection present that pose threat to life/bodily function. · There is potential for severe exacerbation of infection/side effects of treatment. · Therapy requires intensive monitoring for antimicrobial agent toxicity. Thank you for involving me in the care of your patient. I will continue to follow. If you have any additional questions, please do not hesitate to contact me. Subjective:     Presenting complaint in ER:     Chief Complaint   Patient presents with    Altered Mental Status     pt brought by [facility] from [facility] with being found unresponsive by nurse; last seen normal approximately 1.5 hours ago; nurse came in and found patient like this; 35 mcg Fentanyl patch on patient and removed by EMS; given Narcan 2 mg IV by EMS; per EMS, pt has ST-elevation in v2, v3, and aVf        HPI: Elda Puentes is a 80 y.o. female patient, who was seen at the request of Dr. Ludy Barcenas MD.    History was obtained from chart review as the patient is encephalopathic    The patient was admitted on 7/5/2020. I have been consulted to see the patient for above mentioned reason(s). The patient has multiple medical comorbidities, and presented to the ER for altered mental status. The patient is a nursing home resident and has chronic dementia. He was noted to have a high fever of 102.6 on presentation to the ER with white cell count of 30,600. The patient was admitted. She was started on IV linezolid and IV Zosyn empirically by primary.   Blood cultures and urine culture were sent. Urine cultures come back positive for 50,000 CFU per mL of E. coli    I have been asked for my opinion for management for this patient. Past Medical History: All past medical history reviewed today. Past Medical History:   Diagnosis Date    Arthritis     Chronic kidney disease     Constipation     Dementia ([facility] [address]     Depression     [facility]     GERD (gastroesophageal reflux disease)     Glaucoma     Hyperlipidemia     Hypertension     [facility]     Muscle weakness (generalized)     Unspecified cerebral artery occlusion with cerebral infarction     UTI (lower urinary tract infection)          Past Surgical History: All pastsurgical history was reviewed today. Past Surgical History:   Procedure Laterality Date    APPENDECTOMY      BREAST SURGERY      CHOLECYSTECTOMY      COLONOSCOPY      DILATATION, ESOPHAGUS      ENDOSCOPY, COLON, DIAGNOSTIC      EYE SURGERY      HYSTERECTOMY      JOINT REPLACEMENT      SIGMOIDOSCOPY  3/18/15    TONSILLECTOMY      UPPER GASTROINTESTINAL ENDOSCOPY  3/18/15         Family History: All family history was reviewed today. History reviewed. No pertinent family history. Medications: All current and past medications were reviewed. Medications Prior to Admission: psyllium (HYDROCIL) 95 % PACK packet, Take 1 packet by mouth daily  fentaNYL (DURAGESIC) 25 MCG/HR, Place 1 patch onto the skin every 72 hours .  Earliest Fill Date: 11/1/17  ferrous sulfate 325 (65 Fe) MG tablet, Take 325 mg by mouth daily (with breakfast)  memantine ER (NAMENDA XR) 14 MG CP24 extended release capsule, Take 28 mg by mouth daily  oyster calcium (OYST-DICKSON) 500 MG TABS, Take 500 mg by mouth daily  omeprazole (PRILOSEC) 20 MG delayed release capsule, Take 20 mg by mouth daily  Cholecalciferol (VITAMIN D3) 2000 UNITS CAPS, Take 1,000 Units by mouth daily  Ascorbic Acid (VITAMIN C) 250 MG tablet, Take 500 mg by mouth daily  vitamin B-12 (CYANOCOBALAMIN) 100 MCG tablet, Take 50 mcg by mouth daily  losartan (COZAAR) 50 MG tablet, Take 1 tablet by mouth daily. acetaminophen (TYLENOL) 500 MG tablet, Take 500 mg by mouth 3 times daily. polyethylene glycol (GAVILAX) powder, Take 17 g by mouth daily. nystatin (MYCOSTATIN) 928830 UNIT/GM powder, Apply  topically 2 times daily. Under bilateral breasts with peleverus spray  bisacodyl (BISAC-EVAC) 10 MG suppository, Place 10 mg rectally daily as needed for Constipation. aspirin 81 MG EC tablet, Take 81 mg by mouth daily. albuterol (PROVENTIL) (2.5 MG/3ML) 0.083% nebulizer solution, Take 2.5 mg by nebulization every 4 hours as needed for Wheezing. guaiFENesin (ROBITUSSIN) 100 MG/5ML liquid, Take 200 mg by mouth every 4 hours as needed for Cough. polyvinyl alcohol (LIQUIFILM TEARS) 1.4 % ophthalmic solution, Place 2 drops into the right eye 2 times daily. senna-docusate (PERICOLACE) 8.6-50 MG per tablet, Take 2 tablets by mouth daily. donepezil (ARICEPT) 10 MG tablet, Take 10 mg by mouth nightly. metformin (GLUCOPHAGE) 500 MG tablet, Take 500 mg by mouth 2 times daily (with meals). metoprolol (LOPRESSOR) 25 MG tablet, Take 25 mg by mouth 2 times daily. Hold for bp less than 110/60  insulin detemir (LEVEMIR) 100 UNIT/ML injection, Inject 18 Units into the skin nightly. TRAZODONE HCL, Take 25 mg by mouth nightly   atorvastatin (LIPITOR) 40 MG tablet, Take 40 mg by mouth daily. DULoxetine (CYMBALTA) 30 MG capsule, Take 30 mg by mouth daily.      piperacillin-tazobactam  3.375 g Intravenous Q12H    pantoprazole  40 mg Intravenous BID    aspirin  81 mg Oral Daily    atorvastatin  40 mg Oral Daily    Vitamin D  1,000 Units Oral Daily    [Held by provider] donepezil  10 mg Oral Nightly    [Held by provider] DULoxetine  30 mg Oral Daily    metoprolol tartrate  25 mg Oral BID    calcium elemental  500 mg Oral Daily    vitamin B-12  50 mcg Oral Daily  sodium chloride flush  10 mL Intravenous 2 times per day    heparin (porcine)  5,000 Units Subcutaneous 3 times per day    insulin lispro  0-12 Units Subcutaneous Q4H    sodium chloride flush  10 mL Intravenous 2 times per day    sodium chloride  15 mL/kg Intravenous Once    insulin regular  0.1 Units/kg Intravenous Once    lidocaine 1 % injection  5 mL Intradermal Once    sodium chloride flush  10 mL Intravenous 2 times per day    lidocaine 1 % injection  5 mL Intradermal Once    sodium chloride flush  10 mL Intravenous 2 times per day    aspirin  324 mg Oral Once          REVIEW OF SYSTEMS:       Review of Systems   Unable to perform ROS: Mental status change          Objective:       PHYSICAL EXAM:      Vitals:   Vitals:    07/07/20 0800 07/07/20 1000 07/07/20 1200 07/07/20 1400   BP: (!) 126/54 129/82 (!) 124/39 (!) 129/44   Pulse: 110 100 99 110   Resp: 16 15 14 14   Temp: 97.4 °F (36.3 °C)  97 °F (36.1 °C)    TempSrc: Axillary  Axillary    SpO2: 98% 100% 100%    Weight:       Height:           Physical Exam     General: Encephalopathic but protecting the airway so far,   HEENT: normocephalic, atraumatic, sclera clear, pupils equal, light reflex preserved bilaterally  Cardiovascular: RRR, no murmurs/rubs/gallops detected  Pulmonary: CTABL, no rhonchi/rales   Abdomen/GI: soft, no organomegaly, bowel sounds positive  Neuro: Encephalopathic, pupils are equal and reactive to light, moves all extremities  Skin: no rash,  Musculoskeletal:  No obvious joint swelling, redness. No limitation of range of passive motion  Genitourinary: Garrett's catheter in place   Psych: could not assess   Lymphatic/Immunologic: No obvious bruising, no cervical lymphadenopathy    Lines: All vascular access sites are healthy with no local erythema, discharge or tenderness    Intake and output:     I/O last 3 completed shifts:   In: 8506 [I.V.:3859; IV Piggyback:1090]  Out: 950 [Urine:950]    Lab Data:   All available labs were reviewed by me today. CBC:   Recent Labs     07/06/20  0438 07/06/20  0838 07/07/20  1421   WBC 30.6* 34.5* 33.0*   RBC 3.87* 3.96* 3.19*   HGB 11.5* 11.5* 9.4*   HCT 37.2 39.5 30.9*    278 227   MCV 96.1 99.8 96.9   MCH 29.6 29.2 29.3   MCHC 30.8* 29.3* 30.3*   RDW 17.6* 18.2* 18.3*        BMP:  Recent Labs     07/07/20  0059 07/07/20  0500 07/07/20  1420   * 150* 150*   K 3.7 4.8 4.2   * 117* 119*   CO2 17* 17* 14*   * 156* 150*   CREATININE 4.5* 4.4* 4.3*   CALCIUM 9.3 9.4 9.1   GLUCOSE 134* 93 143*        Hepatic FunctionPanel:   Lab Results   Component Value Date    ALKPHOS 122 07/06/2020    ALT 18 07/06/2020    AST 46 07/06/2020    PROT 7.5 07/06/2020    BILITOT 0.4 07/06/2020    LABALBU 3.0 07/06/2020       CPK:   Lab Results   Component Value Date    CKTOTAL 1,552 (H) 07/06/2020     ESR:   Lab Results   Component Value Date    SEDRATE 105 (H) 06/29/2017     CRP: No results found for: CRP      Imaging: All pertinent images and reports for the current visit were reviewed by meduring this visit. US RENAL COMPLETE   Final Result   Kidneys are bilaterally small. No gross hydronephrosis. 2.2 cm complex appearing cyst at the lower pole of the right kidney. XR CHEST PORTABLE   Final Result   1. No acute airspace consolidation. 2. Increased right paratracheal opacity, and enlargement of the right hilar   shadow, both possibly representing normal vascular anatomy. However,   lymphadenopathy or mass lesions in these locations are less likely diagnostic   considerations, and recommend further characterization with a dedicated chest   CT with contrast.             Outside records:    Labs, Microbiology, Radiology and pertinent results from Care everywhere, if available, were reviewed as a part ofthe consultation.       Problem list:       Patient Active Problem List   Diagnosis Code    Acute lower GI bleeding K92.2    Chronic blood loss anemia D50.0    Dementia ([facility] F03.90    Morbid obesity (HCC) E66.01    Diabetes mellitus ([facility] E11.9    Closed displaced fracture of right patella S82.001A    Status post total right knee replacement Z96.651    Constipation K59.00    CYNTHIA (acute kidney injury) ([facility] N17.9    DKA, type 1, not at goal [facility]) E10.10    Suspected COVID-19 virus infection Z20.828    Severe sepsis (HCC) A41.9, D87.61    Metabolic encephalopathy Z27.55    Hypernatremia E87.0    Nursing home resident Z59.3    History of arterial ischemic stroke Z86.73    Essential hypertension I10    Mixed hyperlipidemia E78.2    History of depression Z86.59    Diarrhea R19.7         Please note that this chart was generated using Dragon dictation software. Although every effort was made to ensure the accuracy of this automated transcription, some errors in transcription may have occurred inadvertently. If you may need any clarification, please do not hesitate to contact me through EPIC or at the phone number provided below with my electronic signature. Any pictures or media included in this note were obtained after taking informed verbal consent from the patient and with their approval to include those in the patient's medical record.       Sussy Ramos MD, MPH  7/7/20, 5:18 PM EDT   [facility] Infectious Disease   Office: [phone]  Fax: [phone]  Tuesday AM clinic:   [address] [address]  Thursday AM clinic: [address] [facility]

## 2020-07-07 NOTE — PROGRESS NOTES
Office : [phone]     Fax :[phone]       Nephrology  Note          Chief Complaint:    Chief Complaint   Patient presents with    Altered Mental Status     pt brought by [facility] from [facility] with being found unresponsive by nurse; last seen normal approximately 1.5 hours ago; nurse came in and found patient like this; 35 mcg Fentanyl patch on patient and removed by EMS; given Narcan 2 mg IV by EMS; per EMS, pt has ST-elevation in v2, v3, and aVf         History of Present Ilness:    Sri Perez is a 80 y.o. female with history of dementia  diabetes type 2 with hyperglycemia, gastroesophageal reflux disease, hyperlipidemia, essential hypertension, history of CVA, CKD stage III (baseline creatinine 1.2-1.3), who presents from nursing home with reports that patient was found unresponsive in bed. It is unclear how long patient had been unresponsive, suspected to have been anywhere from 1 to 1.5 hours prior to the time he was found. Paramedics were called and patient was brought to the emergency room, noted to respond to painful stimuli with eyes open in the emergency room, moving all extremities. He did not have much improvement with Narcan. Paramedics also report some abnormal ST depression on telemetry prior to arrival in the emergency room.     Patient has multiple electrolyte abnormalities on presentation to the emergency room, notably creatinine of 5.0 (baseline 1.2-1.3), sodium 150, potassium 5.4, bicarb 17, anion gap 27, leukocytosis of 26.1, troponin 0 0.34, lactic acid 3.3, proBNP 5257. Chest x-ray did not reveal any consolidation; revealed increased paratracheal opacity for which recommendation was made to rule out malignancy.     All history is gathered Q8H  linezolid (ZYVOX) IVPB 600 mg, Q12H  glucose (GLUTOSE) 40 % oral gel 15 g, PRN  dextrose 50 % IV solution, PRN  glucagon (rDNA) injection 1 mg, PRN  dextrose 5 % solution, PRN  insulin lispro (1 Unit Dial) 0-12 Units, Q4H  0.9 % sodium chloride infusion, Continuous  sodium chloride flush 0.9 % injection 10 mL, 2 times per day  sodium chloride flush 0.9 % injection 10 mL, PRN  acetaminophen (TYLENOL) tablet 650 mg, Q6H PRN    Or  acetaminophen (TYLENOL) suppository 650 mg, Q6H PRN  polyethylene glycol (GLYCOLAX) packet 17 g, Daily PRN  promethazine (PHENERGAN) tablet 12.5 mg, Q6H PRN    Or  ondansetron (ZOFRAN) injection 4 mg, Q6H PRN  0.9 % sodium chloride bolus, Once    Followed by  0.9 % sodium chloride infusion, Continuous  insulin regular (HUMULIN R;NOVOLIN R) injection 8 Units, Once  lidocaine PF 1 % injection 5 mL, Once  sodium chloride flush 0.9 % injection 10 mL, 2 times per day  sodium chloride flush 0.9 % injection 10 mL, PRN  lidocaine PF 1 % injection 5 mL, Once  sodium chloride flush 0.9 % injection 10 mL, 2 times per day  sodium chloride flush 0.9 % injection 10 mL, PRN  aspirin chewable tablet 324 mg, Once      Physical exam:     Vitals:  BP (!) 126/54   Pulse 110   Temp 97.4 °F (36.3 °C) (Axillary)   Resp 16   Ht 5' 5\" (1.651 m)   Wt 165 lb 9.1 oz (75.1 kg)   SpO2 98%   BMI 27.55 kg/m²   Constitutional:  Spontaneous eye opening , responding to simple commands   Skin: no rash, turgor - oral mucosa dry   Heent:  eomi, mmm  Neck: no bruits or jvd noted  Cardiovascular:  S1, S2 without m/r/g  Respiratory: CTA B without w/r/r  Abdomen:  +bs, soft, nt, nd  Ext: no  lower extremity edema    Labs:  CBC:   Recent Labs     07/05/20  2154 07/06/20  0438 07/06/20  0838   WBC 26.1* 30.6* 34.5*   HGB 12.0 11.5* 11.5*    273 278     BMP:    Recent Labs     07/06/20  2133 07/07/20  0059 07/07/20  0500   * 152* 150*   K 4.1 3.7 4.8   * 118* 117*   CO2 15* 17* 17*   * 164* 156* fluids   AG is better  UOP is improving   Recommend to dose adjust all medications  based on renal functions  Maintain SBP> 90 mmHg   Daily weights   AVOID NSAIDs  Avoid Nephrotoxins  Monitor Intake/Output      2. HTN. Controlled BP     3. High anion gap acidosis 2/2 CYNTHIA   AG  30----> 16   lactic acid level is 3  Hold metformin     4. AMS 2/2 to metabolic encephalopathy   Hold namenda , aricept , cymbalata till more awake and alert     5. Hypernatremia - 2/2 hypovolemia.  Continue iv fluids     Strict I/O    continue 1/2 NS   Check BMP every 6 hrs             Thank you for allowing us to participate in care of Anderson Toro         Electronically signed by: Latasha Freeman MD, 7/7/2020, 10:08 AM    Cc time 35 minutes     [facility] [address]  Office : [phone]  Fax :[phone]

## 2020-07-07 NOTE — PROGRESS NOTES
Assisted back to bed using lift. Incontinent of medium amt of loose black stool. June care, vgea care provided. Repositioned.

## 2020-07-07 NOTE — PROGRESS NOTES
[facility]    7/7/2020 8:50 AM        Name: Louise Kelly . Admitted: 7/5/2020  Primary Care Provider: Luis E Morales MD (Tel: [phone])                        Hospital course:   80 y. o. female with history of dementia (without behavioral disturbance), diabetes type 2 with hyperglycemia, gastroesophageal reflux disease,PUD, colon ploys,  hyperlipidemia, essential hypertension, history of CVA, CKD stage III (baseline creatinine 1.2-1.3), who presents from nursing home with reports that patient was found unresponsive in bed.  It is unclear how long patient had been unresponsive, suspected to have been anywhere from 1 to 1.5 hours prior to the time he was found.  Paramedics were called and patient was brought to the emergency room. The patient on presentation was acute encephalopathy due to metabolic problems such as hypernatremia and acute kidney injury. And also developed increased beta hydroxybutyrate and hyperglycemia and developed DKA and transferred to ICU on 7/6/2020, started insulin drip. Anion gap closed. Insulin drip stopped on 7/7/2020     Subjective: Patient remained in acute encephalopathy but answering my questions today. No acute events overnight. Resting well. Pain control. Diet ok. Labs reviewed  Denies any chest pain sob.      Reviewed interval ancillary notes    Current Medications  0.45 % sodium chloride infusion, Continuous  aspirin EC tablet 81 mg, Daily  atorvastatin (LIPITOR) tablet 40 mg, Daily  bisacodyl (DULCOLAX) suppository 10 mg, Daily PRN  Vitamin D (CHOLECALCIFEROL) tablet 1,000 Units, Daily  [Held by provider] donepezil (ARICEPT) tablet 10 mg, Nightly  [Held by provider] DULoxetine (CYMBALTA) extended release capsule 30 mg, Daily  metoprolol tartrate (LOPRESSOR) tablet 25 mg, BID  calcium elemental (OSCAL) tablet 500 mg, Daily  vitamin B-12 (CYANOCOBALAMIN) tablet 50 mcg, Daily  albuterol sulfate  (90 Base) MCG/ACT inhaler 2 puff, Q4H PRN  sodium chloride flush 0.9 % injection 10 mL, 2 times per day  sodium chloride flush 0.9 % injection 10 mL, PRN  acetaminophen (TYLENOL) tablet 650 mg, Q6H PRN    Or  acetaminophen (TYLENOL) suppository 650 mg, Q6H PRN  heparin (porcine) injection 5,000 Units, 3 times per day  piperacillin-tazobactam (ZOSYN) 2.25 g in dextrose 5 % 50 mL IVPB (mini-bag), Q8H  linezolid (ZYVOX) IVPB 600 mg, Q12H  glucose (GLUTOSE) 40 % oral gel 15 g, PRN  dextrose 50 % IV solution, PRN  glucagon (rDNA) injection 1 mg, PRN  dextrose 5 % solution, PRN  insulin lispro (1 Unit Dial) 0-12 Units, Q4H  0.9 % sodium chloride infusion, Continuous  sodium chloride flush 0.9 % injection 10 mL, 2 times per day  sodium chloride flush 0.9 % injection 10 mL, PRN  acetaminophen (TYLENOL) tablet 650 mg, Q6H PRN    Or  acetaminophen (TYLENOL) suppository 650 mg, Q6H PRN  polyethylene glycol (GLYCOLAX) packet 17 g, Daily PRN  promethazine (PHENERGAN) tablet 12.5 mg, Q6H PRN    Or  ondansetron (ZOFRAN) injection 4 mg, Q6H PRN  0.9 % sodium chloride bolus, Once    Followed by  0.9 % sodium chloride infusion, Continuous  insulin regular (HUMULIN R;NOVOLIN R) injection 8 Units, Once  lidocaine PF 1 % injection 5 mL, Once  sodium chloride flush 0.9 % injection 10 mL, 2 times per day  sodium chloride flush 0.9 % injection 10 mL, PRN  lidocaine PF 1 % injection 5 mL, Once  sodium chloride flush 0.9 % injection 10 mL, 2 times per day  sodium chloride flush 0.9 % injection 10 mL, PRN  aspirin chewable tablet 324 mg, Once        Objective:  BP (!) 118/45   Pulse 102   Temp 98.1 °F (36.7 °C) (Axillary)   Resp 14   Ht 5' 5\" (1.651 m)   Wt 165 lb 9.1 oz (75.1 kg)   SpO2 98%   BMI 27.55 kg/m²     Intake/Output Summary (Last 24 hours) at 7/7/2020 0850  Last data filed at 7/7/2020 0614  Gross per 24 hour   Intake 4873 ml   Output 875 ml   Net 3998 ml      Wt Readings from Last 3 Encounters:   07/07/20 165 lb 9.1 oz (75.1 kg)   07/13/18 170 lb 14.4 oz (77.5 kg)   02/28/18 163 lb (73.9 kg)       General appearance: -American elderly lady, making eye contact, not much verbal with me however in the morning, follow commands by nursing staff appears comfortable  Eyes: Sclera clear. Pupils equal.  ENT: Moist oral mucosa. Trachea midline, no adenopathy. Cardiovascular: Regular rhythm, normal S1, S2. No murmur. No edema in lower extremities  Respiratory: Not using accessory muscles. Good inspiratory effort. Clear to auscultation bilaterally, no wheeze or crackles. GI: Abdomen soft, no tenderness, not distended, normal bowel sounds, Garrett catheter intact  Musculoskeletal: No cyanosis in digits, neck supple  Neurology: CN 2-12 grossly intact. No speech or motor deficits  Psych: Normal affect. Alert and oriented in time, place and person  Skin: Warm, dry, normal turgor  Extremities no peripheral edema noted    Labs and Tests:  CBC:   Recent Labs     07/06/20  0438 07/06/20  0838 07/07/20  1421   WBC 30.6* 34.5* 33.0*   HGB 11.5* 11.5* 9.4*   HCT 37.2 39.5 30.9*   MCV 96.1 99.8 96.9    278 227     BMP:    Recent Labs     07/07/20  0059 07/07/20  0500 07/07/20  1420   * 150* 150*   K 3.7 4.8 4.2   * 117* 119*   CO2 17* 17* 14*   * 156* 150*   CREATININE 4.5* 4.4* 4.3*   GLUCOSE 134* 93 143*     Hepatic:   Recent Labs     07/05/20  2154 07/06/20  0438   AST 52* 46*   ALT 18 18   BILITOT 0.3 0.4   ALKPHOS 89 122         Problem List  Active Problems:    Acute kidney injury ([facility] [address]    DKA, type 1, not at goal [facility])  Resolved Problems:    * No resolved hospital problems. *       Assessment & Plan:   1. Acute metabolic encephalopathy.  Could be secondary to multiple electrolyte abnormalities, acute kidney injury, underlying infectious process.   I am able to make a conversation with her she is sleepy however she is answering has history of remote peptic ulcer disease, multiple colonoscopies due to colon polyps will consult gastroenterology for recommendations, continue Protonix intravenously  14. Other comorbidities include gastroesophageal reflux disease, hyperlipidemia, essential hypertension, history of CVA.                Diet: Diet NPO Effective Now  Code:DNR-CC  DVT PPX lovenox       Rony Holbrook MD   7/7/2020 8:50 AM

## 2020-07-08 ENCOUNTER — ANESTHESIA EVENT (OUTPATIENT)
Dept: ENDOSCOPY | Age: 85
DRG: 871 | End: 2020-07-08
Payer: MEDICARE

## 2020-07-08 ENCOUNTER — ANESTHESIA (OUTPATIENT)
Dept: ENDOSCOPY | Age: 85
DRG: 871 | End: 2020-07-08
Payer: MEDICARE

## 2020-07-08 ENCOUNTER — APPOINTMENT (OUTPATIENT)
Dept: CT IMAGING | Age: 85
DRG: 871 | End: 2020-07-08
Payer: MEDICARE

## 2020-07-08 ENCOUNTER — APPOINTMENT (OUTPATIENT)
Dept: GENERAL RADIOLOGY | Age: 85
DRG: 871 | End: 2020-07-08
Payer: MEDICARE

## 2020-07-08 VITALS
DIASTOLIC BLOOD PRESSURE: 52 MMHG | SYSTOLIC BLOOD PRESSURE: 102 MMHG | RESPIRATION RATE: 16 BRPM | OXYGEN SATURATION: 99 %

## 2020-07-08 LAB
ANION GAP SERPL CALCULATED.3IONS-SCNC: 17 MMOL/L (ref 3–16)
ANION GAP SERPL CALCULATED.3IONS-SCNC: 17 MMOL/L (ref 3–16)
BUN BLDV-MCNC: 123 MG/DL (ref 7–20)
BUN BLDV-MCNC: 138 MG/DL (ref 7–20)
CALCIUM SERPL-MCNC: 9.1 MG/DL (ref 8.3–10.6)
CALCIUM SERPL-MCNC: 9.3 MG/DL (ref 8.3–10.6)
CHLORIDE BLD-SCNC: 115 MMOL/L (ref 99–110)
CHLORIDE BLD-SCNC: 115 MMOL/L (ref 99–110)
CO2: 15 MMOL/L (ref 21–32)
CO2: 16 MMOL/L (ref 21–32)
CREAT SERPL-MCNC: 3.7 MG/DL (ref 0.6–1.2)
CREAT SERPL-MCNC: 4.2 MG/DL (ref 0.6–1.2)
GFR AFRICAN AMERICAN: 12
GFR AFRICAN AMERICAN: 14
GFR NON-AFRICAN AMERICAN: 10
GFR NON-AFRICAN AMERICAN: 12
GLUCOSE BLD-MCNC: 120 MG/DL (ref 70–99)
GLUCOSE BLD-MCNC: 126 MG/DL (ref 70–99)
GLUCOSE BLD-MCNC: 128 MG/DL (ref 70–99)
GLUCOSE BLD-MCNC: 166 MG/DL (ref 70–99)
GLUCOSE BLD-MCNC: 166 MG/DL (ref 70–99)
GLUCOSE BLD-MCNC: 177 MG/DL (ref 70–99)
HCT VFR BLD CALC: 26.4 % (ref 36–48)
HCT VFR BLD CALC: 27.4 % (ref 36–48)
HEMOGLOBIN: 8.2 G/DL (ref 12–16)
HEMOGLOBIN: 8.4 G/DL (ref 12–16)
MAGNESIUM: 1.8 MG/DL (ref 1.8–2.4)
MCH RBC QN AUTO: 28.3 PG (ref 26–34)
MCHC RBC AUTO-ENTMCNC: 30.6 G/DL (ref 31–36)
MCV RBC AUTO: 92.5 FL (ref 80–100)
MRSA CULTURE ONLY: NORMAL
ORGANISM: ABNORMAL
PDW BLD-RTO: 17.1 % (ref 12.4–15.4)
PERFORMED ON: ABNORMAL
PHOSPHORUS: 4.4 MG/DL (ref 2.5–4.9)
PLATELET # BLD: 226 K/UL (ref 135–450)
PMV BLD AUTO: 9.3 FL (ref 5–10.5)
POTASSIUM SERPL-SCNC: 3.6 MMOL/L (ref 3.5–5.1)
POTASSIUM SERPL-SCNC: 3.9 MMOL/L (ref 3.5–5.1)
RBC # BLD: 2.96 M/UL (ref 4–5.2)
SODIUM BLD-SCNC: 147 MMOL/L (ref 136–145)
SODIUM BLD-SCNC: 148 MMOL/L (ref 136–145)
URINE CULTURE, ROUTINE: ABNORMAL
WBC # BLD: 32.2 K/UL (ref 4–11)

## 2020-07-08 PROCEDURE — 80048 BASIC METABOLIC PNL TOTAL CA: CPT

## 2020-07-08 PROCEDURE — 3700000000 HC ANESTHESIA ATTENDED CARE: Performed by: INTERNAL MEDICINE

## 2020-07-08 PROCEDURE — 6360000002 HC RX W HCPCS: Performed by: INTERNAL MEDICINE

## 2020-07-08 PROCEDURE — 71045 X-RAY EXAM CHEST 1 VIEW: CPT

## 2020-07-08 PROCEDURE — 1200000000 HC SEMI PRIVATE

## 2020-07-08 PROCEDURE — 2580000003 HC RX 258: Performed by: HOSPITALIST

## 2020-07-08 PROCEDURE — C9113 INJ PANTOPRAZOLE SODIUM, VIA: HCPCS | Performed by: PHYSICIAN ASSISTANT

## 2020-07-08 PROCEDURE — 71250 CT THORAX DX C-: CPT

## 2020-07-08 PROCEDURE — 85014 HEMATOCRIT: CPT

## 2020-07-08 PROCEDURE — 99233 SBSQ HOSP IP/OBS HIGH 50: CPT | Performed by: INTERNAL MEDICINE

## 2020-07-08 PROCEDURE — 6370000000 HC RX 637 (ALT 250 FOR IP): Performed by: INTERNAL MEDICINE

## 2020-07-08 PROCEDURE — 3700000001 HC ADD 15 MINUTES (ANESTHESIA): Performed by: INTERNAL MEDICINE

## 2020-07-08 PROCEDURE — 7100000000 HC PACU RECOVERY - FIRST 15 MIN: Performed by: INTERNAL MEDICINE

## 2020-07-08 PROCEDURE — 7100000001 HC PACU RECOVERY - ADDTL 15 MIN: Performed by: INTERNAL MEDICINE

## 2020-07-08 PROCEDURE — 84100 ASSAY OF PHOSPHORUS: CPT

## 2020-07-08 PROCEDURE — 83735 ASSAY OF MAGNESIUM: CPT

## 2020-07-08 PROCEDURE — 2580000003 HC RX 258: Performed by: NURSE ANESTHETIST, CERTIFIED REGISTERED

## 2020-07-08 PROCEDURE — 85018 HEMOGLOBIN: CPT

## 2020-07-08 PROCEDURE — 85027 COMPLETE CBC AUTOMATED: CPT

## 2020-07-08 PROCEDURE — 88305 TISSUE EXAM BY PATHOLOGIST: CPT

## 2020-07-08 PROCEDURE — 3609012400 HC EGD TRANSORAL BIOPSY SINGLE/MULTIPLE: Performed by: INTERNAL MEDICINE

## 2020-07-08 PROCEDURE — 6360000002 HC RX W HCPCS: Performed by: PHYSICIAN ASSISTANT

## 2020-07-08 PROCEDURE — 2580000003 HC RX 258: Performed by: INTERNAL MEDICINE

## 2020-07-08 PROCEDURE — 92526 ORAL FUNCTION THERAPY: CPT

## 2020-07-08 PROCEDURE — 6360000002 HC RX W HCPCS: Performed by: NURSE ANESTHETIST, CERTIFIED REGISTERED

## 2020-07-08 PROCEDURE — 97129 THER IVNTJ 1ST 15 MIN: CPT

## 2020-07-08 PROCEDURE — 36592 COLLECT BLOOD FROM PICC: CPT

## 2020-07-08 PROCEDURE — 2709999900 HC NON-CHARGEABLE SUPPLY: Performed by: INTERNAL MEDICINE

## 2020-07-08 PROCEDURE — 2000000000 HC ICU R&B

## 2020-07-08 PROCEDURE — 36415 COLL VENOUS BLD VENIPUNCTURE: CPT

## 2020-07-08 RX ORDER — SODIUM CHLORIDE 9 MG/ML
INJECTION, SOLUTION INTRAVENOUS CONTINUOUS PRN
Status: DISCONTINUED | OUTPATIENT
Start: 2020-07-08 | End: 2020-07-08 | Stop reason: SDUPTHER

## 2020-07-08 RX ORDER — PROPOFOL 10 MG/ML
INJECTION, EMULSION INTRAVENOUS CONTINUOUS PRN
Status: DISCONTINUED | OUTPATIENT
Start: 2020-07-08 | End: 2020-07-08 | Stop reason: SDUPTHER

## 2020-07-08 RX ORDER — PROPOFOL 10 MG/ML
INJECTION, EMULSION INTRAVENOUS PRN
Status: DISCONTINUED | OUTPATIENT
Start: 2020-07-08 | End: 2020-07-08 | Stop reason: SDUPTHER

## 2020-07-08 RX ADMIN — Medication 10 ML: at 20:53

## 2020-07-08 RX ADMIN — PROPOFOL 50 MCG/KG/MIN: 10 INJECTION, EMULSION INTRAVENOUS at 10:05

## 2020-07-08 RX ADMIN — Medication 10 ML: at 20:51

## 2020-07-08 RX ADMIN — PANTOPRAZOLE SODIUM 40 MG: 40 INJECTION, POWDER, FOR SOLUTION INTRAVENOUS at 12:47

## 2020-07-08 RX ADMIN — HEPARIN SODIUM 5000 UNITS: 5000 INJECTION INTRAVENOUS; SUBCUTANEOUS at 06:14

## 2020-07-08 RX ADMIN — PIPERACILLIN AND TAZOBACTAM 3.38 G: 3; .375 INJECTION, POWDER, LYOPHILIZED, FOR SOLUTION INTRAVENOUS at 22:25

## 2020-07-08 RX ADMIN — INSULIN LISPRO 2 UNITS: 100 INJECTION, SOLUTION INTRAVENOUS; SUBCUTANEOUS at 21:40

## 2020-07-08 RX ADMIN — INSULIN LISPRO 2 UNITS: 100 INJECTION, SOLUTION INTRAVENOUS; SUBCUTANEOUS at 17:17

## 2020-07-08 RX ADMIN — SODIUM CHLORIDE: 4.5 INJECTION, SOLUTION INTRAVENOUS at 06:15

## 2020-07-08 RX ADMIN — PROPOFOL 30 MG: 10 INJECTION, EMULSION INTRAVENOUS at 10:04

## 2020-07-08 RX ADMIN — PANTOPRAZOLE SODIUM 40 MG: 40 INJECTION, POWDER, FOR SOLUTION INTRAVENOUS at 20:51

## 2020-07-08 RX ADMIN — METOPROLOL TARTRATE 25 MG: 25 TABLET, FILM COATED ORAL at 20:51

## 2020-07-08 RX ADMIN — Medication 10 ML: at 20:52

## 2020-07-08 RX ADMIN — Medication 10 ML: at 12:47

## 2020-07-08 RX ADMIN — PIPERACILLIN AND TAZOBACTAM 3.38 G: 3; .375 INJECTION, POWDER, LYOPHILIZED, FOR SOLUTION INTRAVENOUS at 12:46

## 2020-07-08 RX ADMIN — SODIUM CHLORIDE: 9 INJECTION, SOLUTION INTRAVENOUS at 09:37

## 2020-07-08 RX ADMIN — Medication 10 ML: at 12:51

## 2020-07-08 ASSESSMENT — PAIN SCALES - GENERAL
PAINLEVEL_OUTOF10: 0

## 2020-07-08 ASSESSMENT — ENCOUNTER SYMPTOMS: SHORTNESS OF BREATH: 1

## 2020-07-08 NOTE — ANESTHESIA POSTPROCEDURE EVALUATION
Department of Anesthesiology  Postprocedure Note    Patient: Sri Perez  MRN: [id number]  YOB: 1933  Date of evaluation: 7/8/2020  Time:  10:25 AM     Procedure Summary     Date:  07/08/20 Room / Location:  [address] [place] [address]    Anesthesia Start:  4506 Anesthesia Stop:  6962    Procedure:  EGD BIOPSY (N/A Abdomen) Diagnosis:  (Anemia)    Surgeon:  Pam Alatorre MD Responsible Provider:  Cherie Guan MD    Anesthesia Type:  MAC ASA Status:  4          Anesthesia Type: MAC    Chaim Phase I:      Chaim Phase II:      Last vitals: Reviewed and per EMR flowsheets.        Anesthesia Post Evaluation    Patient location during evaluation: PACU  Patient participation: complete - patient participated  Level of consciousness: awake  Airway patency: patent  Nausea & Vomiting: no vomiting and no nausea  Complications: no  Cardiovascular status: hemodynamically stable  Respiratory status: acceptable  Hydration status: stable

## 2020-07-08 NOTE — ANESTHESIA PRE PROCEDURE
Department of Anesthesiology  Preprocedure Note       Name:  Jessica Etienne   Age:  80 y.o.  :  1933                                          MRN:  [id number]         Date:  2020      Surgeon: Franco Frost):  Clarence Amador MD    Procedure: Procedure(s):  EGD DIAGNOSTIC ONLY    Medications prior to admission:   Prior to Admission medications    Medication Sig Start Date End Date Taking? Authorizing Provider   psyllium (HYDROCIL) 95 % PACK packet Take 1 packet by mouth daily 18   LEROY May - CNP   fentaNYL (DURAGESIC) 25 MCG/HR Place 1 patch onto the skin every 72 hours . Earliest Fill Date: 17   Poncho San MD   ferrous sulfate 325 (65 Fe) MG tablet Take 325 mg by mouth daily (with breakfast)    Historical Provider, MD   memantine ER (NAMENDA XR) 14 MG CP24 extended release capsule Take 28 mg by mouth daily    Historical Provider, MD   oyster calcium (OYST-DICKSON) 500 MG TABS Take 500 mg by mouth daily    Historical Provider, MD   omeprazole (PRILOSEC) 20 MG delayed release capsule Take 20 mg by mouth daily    Historical Provider, MD   Cholecalciferol (VITAMIN D3) 2000 UNITS CAPS Take 1,000 Units by mouth daily    Historical Provider, MD   Ascorbic Acid (VITAMIN C) 250 MG tablet Take 500 mg by mouth daily    Historical Provider, MD   vitamin B-12 (CYANOCOBALAMIN) 100 MCG tablet Take 50 mcg by mouth daily    Historical Provider, MD   losartan (COZAAR) 50 MG tablet Take 1 tablet by mouth daily. 3/19/15   Yamel Jackson MD   acetaminophen (TYLENOL) 500 MG tablet Take 500 mg by mouth 3 times daily. Historical Provider, MD   polyethylene glycol (GAVILAX) powder Take 17 g by mouth daily. Historical Provider, MD   nystatin (MYCOSTATIN) 132513 UNIT/GM powder Apply  topically 2 times daily. Under bilateral breasts with peleverus spray    Historical Provider, MD   bisacodyl (BISAC-EVAC) 10 MG suppository Place 10 mg rectally daily as needed for Constipation.     Historical Provider, MD   aspirin 81 MG EC tablet Take 81 mg by mouth daily. Historical Provider, MD   albuterol (PROVENTIL) (2.5 MG/3ML) 0.083% nebulizer solution Take 2.5 mg by nebulization every 4 hours as needed for Wheezing. Historical Provider, MD   guaiFENesin (ROBITUSSIN) 100 MG/5ML liquid Take 200 mg by mouth every 4 hours as needed for Cough. Historical Provider, MD   polyvinyl alcohol (LIQUIFILM TEARS) 1.4 % ophthalmic solution Place 2 drops into the right eye 2 times daily. Historical Provider, MD   senna-docusate (PERICOLACE) 8.6-50 MG per tablet Take 2 tablets by mouth daily. Historical Provider, MD   donepezil (ARICEPT) 10 MG tablet Take 10 mg by mouth nightly. Historical Provider, MD   metformin (GLUCOPHAGE) 500 MG tablet Take 500 mg by mouth 2 times daily (with meals). Historical Provider, MD   metoprolol (LOPRESSOR) 25 MG tablet Take 25 mg by mouth 2 times daily. Hold for bp less than 110/60    Historical Provider, MD   insulin detemir (LEVEMIR) 100 UNIT/ML injection Inject 18 Units into the skin nightly. Historical Provider, MD   TRAZODONE HCL Take 25 mg by mouth nightly     Historical Provider, MD   atorvastatin (LIPITOR) 40 MG tablet Take 40 mg by mouth daily. Historical Provider, MD   DULoxetine (CYMBALTA) 30 MG capsule Take 30 mg by mouth daily.     Historical Provider, MD       Current medications:    Current Facility-Administered Medications   Medication Dose Route Frequency Provider Last Rate Last Dose    0.45 % sodium chloride infusion   Intravenous Continuous Adan Lei  mL/hr at 07/08/20 0615      piperacillin-tazobactam (ZOSYN) 3.375 g in dextrose 5 % 50 mL IVPB extended infusion (mini-bag)  3.375 g Intravenous Q12H Soco Pichardo MD   Stopped at 07/08/20 0245    pantoprazole (PROTONIX) injection 40 mg  40 mg Intravenous BID Chloe Saldaña PA-C   40 mg at 07/07/20 2013    aspirin EC tablet 81 mg  81 mg Oral Daily MD Celsa Blanchard atorvastatin (LIPITOR) tablet 40 mg  40 mg Oral Daily Taylor Sotomayor MD        bisacodyl (DULCOLAX) suppository 10 mg  10 mg Rectal Daily PRN Taylor Sotomayor MD        Vitamin D (CHOLECALCIFEROL) tablet 1,000 Units  1,000 Units Oral Daily Taylor Sotomayor MD        [Held by provider] donepezil (ARICEPT) tablet 10 mg  10 mg Oral Nightly Taylor Sotomayor MD        [Held by provider] DULoxetine (CYMBALTA) extended release capsule 30 mg  30 mg Oral Daily Taylor Sotomayor MD        metoprolol tartrate (LOPRESSOR) tablet 25 mg  25 mg Oral BID Taylor Sotomayor MD        calcium elemental (OSCAL) tablet 500 mg  500 mg Oral Daily Taylor Sotomayor MD        vitamin B-12 (CYANOCOBALAMIN) tablet 50 mcg  50 mcg Oral Daily Taylor Sotomayor MD        albuterol sulfate  (90 Base) MCG/ACT inhaler 2 puff  2 puff Inhalation Q4H PRN Taylor Sotomayor MD        sodium chloride flush 0.9 % injection 10 mL  10 mL Intravenous 2 times per day Taylor Sotomayor MD   10 mL at 07/07/20 2014    sodium chloride flush 0.9 % injection 10 mL  10 mL Intravenous PRN Taylor Sotomayor MD        acetaminophen (TYLENOL) tablet 650 mg  650 mg Oral Q6H PRN Taylor Sotomayor MD        Or    acetaminophen (TYLENOL) suppository 650 mg  650 mg Rectal Q6H PRN Taylor Sotomayor MD        glucose (GLUTOSE) 40 % oral gel 15 g  15 g Oral PRN Taylor Sotomayor MD        dextrose 50 % IV solution  12.5 g Intravenous PRN Taylor Sotomayor MD        glucagon (rDNA) injection 1 mg  1 mg Intramuscular PRN Taylor Sotomayor MD        dextrose 5 % solution  100 mL/hr Intravenous PRN Taylor Sotomayor MD   Stopped at 07/06/20 0625    insulin lispro (1 Unit Dial) 0-12 Units  0-12 Units Subcutaneous Q4H Taylor Sotomayor MD   2 Units at 07/07/20 1836    0.9 % sodium chloride infusion  1,000 mL Intravenous Keron Renner  mL/hr at 07/06/20 1222 1,000 mL at 07/06/20 1222    sodium chloride flush 0.9 % injection 10 mL  10 mL Intravenous 2 times per day Daryn Corrigan MD   10 mL at 07/07/20 2013    sodium chloride flush 0.9 % injection 10 mL  10 mL Intravenous PRN Daryn Corrigan MD        acetaminophen (TYLENOL) tablet 650 mg  650 mg Oral Q6H PRN Daryn Corrigan MD        Or   Serra acetaminophen (TYLENOL) suppository 650 mg  650 mg Rectal Q6H PRN Daryn Corrigan MD        polyethylene glycol (GLYCOLAX) packet 17 g  17 g Oral Daily PRN Daryn Corrigan MD        promethazine (PHENERGAN) tablet 12.5 mg  12.5 mg Oral Q6H PRN Daryn Corrigan MD        Or    ondansetron (ZOFRAN) injection 4 mg  4 mg Intravenous Q6H PRN Daryn Corrigan MD        0.9 % sodium chloride bolus  15 mL/kg Intravenous Once Daryn Corrigan MD        Followed by   Serra 0.9 % sodium chloride infusion   Intravenous Continuous Daryn Corrigan MD        insulin regular (HUMULIN R;NOVOLIN R) injection 8 Units  0.1 Units/kg Intravenous Once Daryn Corrigan MD        lidocaine PF 1 % injection 5 mL  5 mL Intradermal Once Daryn Corrigan MD        sodium chloride flush 0.9 % injection 10 mL  10 mL Intravenous 2 times per day Daryn Corrigan MD   10 mL at 07/07/20 2013    sodium chloride flush 0.9 % injection 10 mL  10 mL Intravenous PRN Daryn Corrigan MD        lidocaine PF 1 % injection 5 mL  5 mL Intradermal Once Charles Jang MD        sodium chloride flush 0.9 % injection 10 mL  10 mL Intravenous 2 times per day Charles Jang MD   10 mL at 07/07/20 2013    sodium chloride flush 0.9 % injection 10 mL  10 mL Intravenous PRN Charles Jang MD        aspirin chewable tablet 324 mg  324 mg Oral Once Domenica Sears MD           Allergies:  No Known Allergies    Problem List:    Patient Active Problem List   Diagnosis Code    Acute lower GI bleeding K92.2    Chronic blood loss anemia D50.0    Dementia ([facility] [address] F03.90    Morbid obesity ([facility] [address] E66.01    Diabetes mellitus ([facility] [address] E11.9    Closed displaced fracture of right patella S82.001A    Status post total right knee replacement I48.321    Constipation K59.00    CYNTHIA (acute kidney injury) ([facility] [address] N17.9    DKA, type 1, not at goal [facility]) E10.10    Suspected COVID-19 virus infection Z20.828    Severe sepsis ([facility] [address] A41.9, O19.84    Metabolic encephalopathy L06.02    Hypernatremia E87.0    Nursing home resident Z59.3    History of arterial ischemic stroke Z86.73    Essential hypertension I10    Mixed hyperlipidemia E78.2    History of depression Z86.59    Diarrhea R19.7       Past Medical History:        Diagnosis Date    Arthritis     Chronic kidney disease     Constipation     [facility]     Depression     Diabetes mellitus ([facility]     GERD (gastroesophageal reflux disease)     Glaucoma     Hyperlipidemia     Hypertension     MI [facility]myocardial infarction) [facility]     Muscle weakness (generalized)     Unspecified cerebral artery occlusion with cerebral infarction     UTI (lower urinary tract infection)        Past Surgical History:        Procedure Laterality Date    APPENDECTOMY      BREAST SURGERY      CHOLECYSTECTOMY      COLONOSCOPY      DILATATION, ESOPHAGUS      ENDOSCOPY, COLON, DIAGNOSTIC      EYE SURGERY      HYSTERECTOMY      JOINT REPLACEMENT      SIGMOIDOSCOPY  3/18/15    TONSILLECTOMY      UPPER GASTROINTESTINAL ENDOSCOPY  3/18/15       Social History:    Social History     Tobacco Use    Smoking status: Former Smoker     Packs/day: 0.25     Years: 15.00     Pack years: 3.75    Smokeless tobacco: Never Used   Substance Use Topics    Alcohol use:  No                                Counseling given: Not Answered      Vital Signs (Current):   Vitals:    07/08/20 0400 07/08/20 0500 07/08/20 0600 07/08/20 0700   BP: (!) 149/53 (!) 128/48 (!) 130/52 (!) 135/52   Pulse: 97 97 116 108   Resp: 16 27 19 12   Temp: 97.3 °F (36.3 °C)      TempSrc: Axillary      SpO2:   100% 100%   Weight: 167 lb 15.9 oz (76.2 kg)      Height:                                                  BP Readings from Last 3 Encounters: 07/08/20 (!) 135/52   07/15/18 (!) 148/86   02/28/18 (!) 155/60       NPO Status:                                                                                 BMI:   Wt Readings from Last 3 Encounters:   07/08/20 167 lb 15.9 oz (76.2 kg)   07/13/18 170 lb 14.4 oz (77.5 kg)   02/28/18 163 lb (73.9 kg)     Body mass index is 27.96 kg/m².     CBC:   Lab Results   Component Value Date    WBC 32.2 07/08/2020    RBC 2.96 07/08/2020    HGB 8.4 07/08/2020    HCT 27.4 07/08/2020    MCV 92.5 07/08/2020    RDW 17.1 07/08/2020     07/08/2020       CMP:   Lab Results   Component Value Date     07/08/2020    K 3.9 07/08/2020    K 5.4 07/05/2020     07/08/2020    CO2 15 07/08/2020     07/08/2020    CREATININE 4.2 07/08/2020    GFRAA 12 07/08/2020    AGRATIO 0.7 07/06/2020    LABGLOM 10 07/08/2020    GLUCOSE 128 07/08/2020    PROT 7.5 07/06/2020    CALCIUM 9.3 07/08/2020    BILITOT 0.4 07/06/2020    ALKPHOS 122 07/06/2020    AST 46 07/06/2020    ALT 18 07/06/2020       POC Tests:   Recent Labs     07/08/20  0813   POCGLU 120*       Coags:   Lab Results   Component Value Date    PROTIME 13.3 07/05/2020    INR 1.14 07/05/2020    APTT 32.9 07/13/2018       HCG (If Applicable): No results found for: PREGTESTUR, PREGSERUM, HCG, HCGQUANT     ABGs: No results found for: PHART, PO2ART, DTW6ZPF, LKQ1RVU, BEART, Q8BPOQZG     Type & Screen (If Applicable):  No results found for: LABABO, LABRH    Drug/Infectious Status (If Applicable):  No results found for: HIV, HEPCAB    COVID-19 Screening (If Applicable):   Lab Results   Component Value Date    COVID19 Not Detected 07/05/2020         Anesthesia Evaluation  Patient summary reviewed and Nursing notes reviewed  Airway: Mallampati: II        Dental:          Pulmonary:   (+) shortness of breath:  asthma:                            Cardiovascular:  Exercise tolerance: poor (<4 METS),   (+) hypertension:, past MI:, hyperlipidemia                  Neuro/Psych:   (+) CVA:, psychiatric history:            GI/Hepatic/Renal:   (+) GERD:, renal disease:,           Endo/Other:    (+) Diabetes, . Abdominal:           Vascular:                                        Anesthesia Plan      MAC     ASA 4       Induction: intravenous. MIPS: Prophylactic antiemetics administered. Anesthetic plan and risks discussed with patient. Plan discussed with CRNA.                   Champ Hendricks MD   7/8/2020

## 2020-07-08 NOTE — PROGRESS NOTES
Reviewed patient's medical and surgical history in electronic record and with patient at the bedside. All questions regarding procedure answered. Scope verified using 2 person system.

## 2020-07-08 NOTE — PROGRESS NOTES
Adm to pacu from endo per cart awakening. Respirations easy & symmetrical. Abdomen soft. Oriented to person. Moves extremities to command.

## 2020-07-08 NOTE — PROGRESS NOTES
Speech Language Pathology  Dysphagia Treatment Note    Name:  Jessica Etienne  :   1933  Medical Diagnosis:  Acute kidney injury ([facility] [address] [N17.9]  Acute kidney injury ([facility] [address] [N17.9]  DKA, type 1, not at goal [facility]) [E10.10]  Treatment Diagnosis: Oropharyngeal Dysphagia  Pain level:  Pt denies pain at this time    Pt seen bedside for dysphagia treatment follow up to Clinical Swallow Evaluation completed 20 (see report). This date, Pt more easily awakened, able to follow some basic commands and is intermittently verbally responsive to direct questions. Pt is oriented to person and place ('hospital\") but not to time or current situation. Therefore, cognitive goals will be established to improve awareness of current situation and swallowing safety (see Cognitive goals below)    Current Diet Level: NPO with ongoing assessment of swallow function. Allow meds with puree when pt is awake and alert     Assessment of Texture Tolerance:  -Impressions: Pt sleeping upon my arrival but was able to be awakened with verbal prompts and repositioning. Pt more verbally responsive this date (ie name and \"yes/no\") but remains lethargic with redirection for alertness required. Pt accepting of po trials of ice chips, thin water, nectar thick liquids, purees and soft solids via tsp. Clinical symptoms of pharyngeal pooling, delayed swallow initiation, and reduced laryngeal excursion noted with all textures. Intermittently reduced sensation for timely swallow initiation with ABSENT swallows noted with ice chips and thin liquids via tsp. Intermittent clinical symptoms of penetration/aspiration noted with thins via tsp and cup. Improved timely swallow initiation with improved laryngeal excursion noted with nectar thick and puree textures. However, prolonged oral holding and delays in swallow initiation noted with all textures over successive po trials.  Prolonged mastication with reduced oral and pharyngeal clearing noted with soft solids. Diet and Treatment Recommendations:  1)Advance diet to Dysphagia I Pureed with NECTAR thick liquids/no straws/meds (crushed) with applesauce  2)Allow po only when Pt demonstrates sustained alertness and as tolerated    Cognitive Assessment/Treatment Goals:  Reduced orientation with confusion negatively impacts carryover of compensatory techniques for swallow safety. Pt is oriented to self only. Short term recall of daily events and new information is severely impaired, and further contributes to confused state. Therefore, improved orientation and techniques to improve short term recall will be incorporated in therapy, in order to improve recall of compensatory strategies, diet texture recommendations, and aspiration precautions. Goals:  1)Pt will improve orientation to x4, for improved awareness of surroundings and reduced confusion (Ongoing 7/8/20)  2)Pt will improve short term recall of daily events and newly learned information via graded tasks, to 50%, for improved safety with dysphagia recommendations. (Ongoing 7/8/20)    Plan:  Continued daily Dysphagia treatment with goals per plan of care. Patient/Family Education:Education given to the Pt and nurse, who verbalized understanding    Discharge Recommendations:  Pt will benefit from continued skilled Speech Therapy for Dysphagia services, prior to returning home. Timed Code Treatment: 15 min    Total Treatment Time: 35 min    If patient discharges prior to next session this note will serve as a discharge summary.      Jolynn Valadez OCDQF-[facility]#[id number]

## 2020-07-08 NOTE — PLAN OF CARE
Problem: Falls - Risk of:  Goal: Will remain free from falls  Description: Will remain free from falls  Outcome: Ongoing   Fall risk protocol in place  Bent Petit fall risk assessed q shift      Problem: Skin Integrity:  Goal: Will show no infection signs and symptoms  Description: Will show no infection signs and symptoms  Outcome: Ongoing  Wound prevention protocol in place  Walt assessed q shift

## 2020-07-08 NOTE — PROGRESS NOTES
See complex assessment, vitals, I&O for complete assessment. Opens eyes to name called. Alert to name only. Answering simple questions with 1 or 2 word answers. Following simple commands. VSS  Monitor NSR  Lungs clear, diminished. Abd soft with +BSx4  Skin warm, dry intact, dry & flaky. BLE pulses with doppler. Scattered bruising to UE's, Stage 2 noted to R buttock, abrasion to L lateral heel. Garrett with qs urine. Plan of care for today- comfort, safety, possible EGD, assist with needs prn. Repositioned.

## 2020-07-08 NOTE — PROGRESS NOTES
Pt reassessed,  Alert to name & place. Continues answering questions using 1 to 2 words. Follows simple commands. VSS  Monitor NSR  Denies any c/o. Lungs remain diminished with fine crackles noted to RLL. See flow sheets for complete assessment.   Repositioned

## 2020-07-08 NOTE — PROGRESS NOTES
1 [facility],6Th Floor    Attempted to see pt for PT/OT treatment. Discussed with nursing. Patient leaving floor shortly for EGD. Will hold therapy at this time and will follow up with pt in pm as schedule permits.  Thanks, Pablo Hummel, PT, DPT [id number], Geri Bennett, OTR/L [id number]

## 2020-07-08 NOTE — PROGRESS NOTES
Clinical Pharmacy Note     Heparin placed on hold by Yohana Boone. Order discontinued per protocol. Please assess and reorder when appropriate. Thank you for allowing us to participate in the care of this patient.      Magalis Odonnell, PharmD, [address].   [id number]

## 2020-07-08 NOTE — PROGRESS NOTES
[facility]    7/8/2020 8:49 AM        Name: Humaira Jerry . Admitted: 7/5/2020  Primary Care Provider: Michelle Lopez MD (Tel: [phone])                        Hospital course:   80 y. o. female with history of dementia (without behavioral disturbance), diabetes type 2 with hyperglycemia, gastroesophageal reflux disease,PUD, colon ploys,  hyperlipidemia, essential hypertension, history of CVA, CKD stage III (baseline creatinine 1.2-1.3), who presents from nursing home with reports that patient was found unresponsive in bed.  It is unclear how long patient had been unresponsive, suspected to have been anywhere from 1 to 1.5 hours prior to the time he was found.  Paramedics were called and patient was brought to the emergency room. The patient on presentation was acute encephalopathy due to metabolic problems such as hypernatremia and acute kidney injury. And also developed increased beta hydroxybutyrate and hyperglycemia and developed DKA and transferred to ICU on 7/6/2020, started insulin drip. Anion gap closed. Insulin drip stopped on 7/7/2020    Subjective:  . No acute events overnight. Resting well. Pain control. Diet ok. Labs reviewed  Denies any chest pain sob.      Reviewed interval ancillary notes    Current Medications  0.45 % sodium chloride infusion, Continuous  piperacillin-tazobactam (ZOSYN) 3.375 g in dextrose 5 % 50 mL IVPB extended infusion (mini-bag), Q12H  pantoprazole (PROTONIX) injection 40 mg, BID  aspirin EC tablet 81 mg, Daily  atorvastatin (LIPITOR) tablet 40 mg, Daily  bisacodyl (DULCOLAX) suppository 10 mg, Daily PRN  Vitamin D (CHOLECALCIFEROL) tablet 1,000 Units, Daily  [Held by provider] donepezil (ARICEPT) tablet 10 mg, Nightly  [Held by provider] DULoxetine (CYMBALTA) extended release capsule 30 mg, Daily  metoprolol tartrate (LOPRESSOR) tablet 25 mg, BID  calcium elemental (OSCAL) tablet 500 mg, Daily  vitamin B-12 (CYANOCOBALAMIN) tablet 50 mcg, Daily  albuterol sulfate  (90 Base) MCG/ACT inhaler 2 puff, Q4H PRN  sodium chloride flush 0.9 % injection 10 mL, 2 times per day  sodium chloride flush 0.9 % injection 10 mL, PRN  acetaminophen (TYLENOL) tablet 650 mg, Q6H PRN    Or  acetaminophen (TYLENOL) suppository 650 mg, Q6H PRN  glucose (GLUTOSE) 40 % oral gel 15 g, PRN  dextrose 50 % IV solution, PRN  glucagon (rDNA) injection 1 mg, PRN  dextrose 5 % solution, PRN  insulin lispro (1 Unit Dial) 0-12 Units, Q4H  0.9 % sodium chloride infusion, Continuous  sodium chloride flush 0.9 % injection 10 mL, 2 times per day  sodium chloride flush 0.9 % injection 10 mL, PRN  acetaminophen (TYLENOL) tablet 650 mg, Q6H PRN    Or  acetaminophen (TYLENOL) suppository 650 mg, Q6H PRN  polyethylene glycol (GLYCOLAX) packet 17 g, Daily PRN  promethazine (PHENERGAN) tablet 12.5 mg, Q6H PRN    Or  ondansetron (ZOFRAN) injection 4 mg, Q6H PRN  0.9 % sodium chloride bolus, Once    Followed by  0.9 % sodium chloride infusion, Continuous  insulin regular (HUMULIN R;NOVOLIN R) injection 8 Units, Once  lidocaine PF 1 % injection 5 mL, Once  sodium chloride flush 0.9 % injection 10 mL, 2 times per day  sodium chloride flush 0.9 % injection 10 mL, PRN  lidocaine PF 1 % injection 5 mL, Once  sodium chloride flush 0.9 % injection 10 mL, 2 times per day  sodium chloride flush 0.9 % injection 10 mL, PRN  aspirin chewable tablet 324 mg, Once        Objective:  BP (!) 135/52   Pulse 108   Temp 97.3 °F (36.3 °C) (Axillary)   Resp 12   Ht 5' 5\" (1.651 m)   Wt 167 lb 15.9 oz (76.2 kg)   SpO2 100%   BMI 27.96 kg/m²     Intake/Output Summary (Last 24 hours) at 7/8/2020 0849  Last data filed at 7/8/2020 0615  Gross per 24 hour   Intake 2233 ml   Output 1125 ml   Net 1108 ml      Wt Readings from Last 3 Encounters:   07/08/20 167 lb 15.9 oz (76.2 kg) 07/13/18 170 lb 14.4 oz (77.5 kg)   02/28/18 163 lb (73.9 kg)       General appearance: -American elderly lady, more responsive today talking several sentences , following commands   Eyes: Sclera clear. Pupils equal.  ENT: Moist oral mucosa. Trachea midline, no adenopathy. Cardiovascular: Regular rhythm, normal S1, S2. No murmur. No edema in lower extremities  Respiratory: Not using accessory muscles. Good inspiratory effort. Clear to auscultation bilaterally, no wheeze or crackles. GI: Abdomen soft, no tenderness, not distended, normal bowel sounds, Garrett catheter intact  Musculoskeletal: No cyanosis in digits, neck supple  Neurology: CN 2-12 grossly intact. No speech or motor deficits  Psych: Normal affect. Alert and oriented to herself only  Skin: Warm, dry, normal turgor  Extremities no peripheral edema noted      Labs and Tests:  CBC:   Recent Labs     07/06/20  0838 07/07/20  1421 07/08/20  0438   WBC 34.5* 33.0* 32.2*   HGB 11.5* 9.4* 8.4*   HCT 39.5 30.9* 27.4*   MCV 99.8 96.9 92.5    227 226     BMP:    Recent Labs     07/07/20  1420 07/07/20  2052 07/08/20  0438   * 150* 147*   K 4.2 4.3 3.9   * 118* 115*   CO2 14* 15* 15*   * 144* 138*   CREATININE 4.3* 4.2* 4.2*   GLUCOSE 143* 137* 128*     Hepatic:   Recent Labs     07/05/20  2154 07/06/20  0438   AST 52* 46*   ALT 18 18   BILITOT 0.3 0.4   ALKPHOS 89 122         Problem List  Active Problems:    CYNTHIA (acute kidney injury) ([address]    DKA, type 1, not at goal [facility])    Suspected COVID-19 virus infection    Severe sepsis ([address]    Metabolic encephalopathy    Hypernatremia    Nursing home resident    History of arterial ischemic stroke    Essential hypertension    Mixed hyperlipidemia    History of depression    Diarrhea  Resolved Problems:    * No resolved hospital problems. *       Assessment & Plan: 1. Acute metabolic encephalopathy.  Could be secondary to multiple electrolyte abnormalities, acute kidney injury, underlying infectious process. I am able to make a conversation with her she is sleepy however she is answering questions. Frida Roth does have underlying dementia at baseline. 2.Acute kidney injury on CKD stage III.  Baseline creatinine 1.2-1.3; creatinine 4.2 today, improving, nephrology service following. 3.E. coli UTI significant pyuria noted, significant leukocytosis noted, E. coli growing in the urine culture, we will stopc Zyvox and continue Zosyn day #3.  Continue gentle intravenous fluid. Significant leukocytosis noted, C. difficile toxin pending. 4.Sepsis as manifested by tachycardia, elevated white cell count, E. coli UTI,  we will check another set of blood cultures, afebrile right now, will check lactic acid today. Consult infectious diseaseservice. 5.Acute respiratory failure with hypoxia.  Suspect secondary to underlying pulmonary infectious process.  Currently 96% and room air, posterior right-sided crackles noted COVID-19 negative . 6.Elevated troponin of 0.34, abnormal EKG.  Could be secondary to underlying sepsis and acute kidney injury.  Downtrending, continue with aspirin, statin. Echocardiogram showed grade 2 diastolic dysfunction     7. Hypernatremia.    Continue sodium chloride 0.45% 100 ml/h.    8.Diabetes type 2 currently blood sugar 120, DKA protocol stopped, continue with sliding scale insulin hold Lantus due to acute kidney injury  9. Anion gap metabolic acidosis with beta hydroxybutyrate increase considered DKA moved to ICU setting yesterday and start DKA protocol. 10. Lactic acidosis.  Likely secondary to underlying sepsis (as noted above). Resolved. 11.Abnormal chest x-ray with increased paratracheal opacity, rule out malignancy.  Consider CT- chest with contrast once renal function stable, especially if family willing to pursue further evaluation to rule out malignancy.   12. History of dementia without behavioral disturbance, continue donezepil  13. Black stools: Patient has history of remote peptic ulcer disease, multiple colonoscopies due to colon polyps,  Gastroenterology service performed upper GI endoscopy earlier today with findings of moderate gastritis, biopsy performed no NSAIDs continue IV PPI for 72 hours then switch to oral PPI for 8 weeks twice daily  14. Other comorbidities include gastroesophageal reflux disease, hyperlipidemia, essential hypertension, history of CVA.            Diet: Diet NPO, After Midnight Exceptions are: Sips with Meds  Code:[id number]-CC  DVT PPX lovenox       Blas Rinaldi MD   7/8/2020 8:49 AM

## 2020-07-08 NOTE — PROGRESS NOTES
Infectious Diseases   Progress Note      Admission Date: 7/5/2020  Hospital Day: Hospital Day: 4   Attending: Sereniyt Boogie MD  Date of service: 7/8/2020     Chief complaint/ Reason for consult:     · Severe sepsis with high fever, metabolic encephalopathy, acute renal failure  · Complicated E. coli UTI  · Right renal cyst on ultrasound  · Hypernatremia  · History of stroke    Microbiology:        I have reviewed allavailable micro lab data and cultures    · Blood culture (2/2) - collected on 7/5/2020: Negative so far  · Nasal MRSA culture- collectedon 7/6/2020: Negative  · Urine culture  - collected on 7/5/2020: 50,000 CFU per mL of E. coli    Escherichia coli (1)     Antibiotic Interpretation JOHANNA Status    ampicillin Sensitive 4 mcg/mL     ceFAZolin Sensitive <=4 mcg/mL      NOTE: Cefazolin should only be used for uncomplicated UTI        for E.coli or Klebsiella pneumoniae. cefepime Sensitive <=0.12 mcg/mL     cefTRIAXone Sensitive <=0.25 mcg/mL     ciprofloxacin Sensitive <=0.25 mcg/mL     ertapenem Sensitive <=0.12 mcg/mL     gentamicin Sensitive <=1 mcg/mL     levofloxacin Sensitive <=0.12 mcg/mL     nitrofurantoin Sensitive <=16 mcg/mL     piperacillin-tazobactam Sensitive <=4 mcg/mL     trimethoprim-sulfamethoxazole Sensitive <=20 mcg/mL         Antibiotics and immunizations:       Current antibiotics: All antibiotics and their doses were reviewed by me    Recent Abx Admin                   piperacillin-tazobactam (ZOSYN) 3.375 g in dextrose 5 % 50 mL IVPB extended infusion (mini-bag) (g) 3.375 g New Bag 07/08/20 1246     3.375 g New Bag 07/07/20 2242                  Immunization History: All immunization history was reviewed by me today. There is no immunization history on file for this patient. Known drug allergies:      All allergies were reviewed and updated    No Known Allergies    Social history:     Social History:  All social andepidemiologic history was reviewed and updated by me today as needed. · Tobacco use:   reports that she has quit smoking. She has a 3.75 pack-year smoking history. She has never used smokeless tobacco.  · Alcohol use:   reports no history of alcohol use. · Currently lives in: [place]  ·  reports no history of drug use. Assessment:     The patient is a 80 y.o. old female who  has a past medical history of Arthritis, Chronic kidney disease, Constipation, Dementia ([address] Depression, Diabetes mellitus ([address] GERD (gastroesophageal reflux disease), Glaucoma, Hyperlipidemia, Hypertension, MI (myocardial infarction) ([address] Muscle weakness (generalized), Unspecified cerebral artery occlusion with cerebral infarction, and UTI (lower urinary tract infection). with following problems:    · Severe sepsis with high fever, metabolic encephalopathy, acute renal failure -fever has come down, leukocytosis encephalopathy continue, serum creatinine 3.7  · Complicated E. coli UTI-covered with Zosyn  · Right renal cyst on ultrasound  · Hypernatremia-serum sodium is 148 today  · History of stroke  · Nursing home resident  · Essential hypertension-blood pressure okay  · Mixed hyperlipidemia  · History of depression  · Diarrhea      Discussion:      The patient remains on IV Zosyn. Her white cell count is 32,200, fever has improved. T-max is 97.8. White cell count is unusually high and seems out of proportion to her E. coli UTI. I reviewed her previous white cell count results and her white cell count used to be in normal range in 2018. Stool C. difficile was negative    Serum creatinine 3.7 today    Chest x-ray was unremarkable. The patient had an EGD with biopsies done today. Was found to have moderate gastritis. Biopsies were taken by GI.     Plan:     Diagnostic Workup:    · Will order CT scan of chest abdomen and pelvis without contrast to look for any source for her ongoing significant bandemia  · Continue to follow  fever curve, WBC count and Chronic kidney disease     Constipation     Dementia ([facility]     Depression     Diabetes mellitus (HCC)     GERD (gastroesophageal reflux disease)     Glaucoma     Hyperlipidemia     Hypertension     MI (myocardial infarction) ([facility]     Muscle weakness (generalized)     Unspecified cerebral artery occlusion with cerebral infarction     UTI (lower urinary tract infection)        Past Surgical History: All past surgical history was reviewed today. Past Surgical History:   Procedure Laterality Date    APPENDECTOMY      BREAST SURGERY      CHOLECYSTECTOMY      COLONOSCOPY      DILATATION, ESOPHAGUS      ENDOSCOPY, COLON, DIAGNOSTIC      EYE SURGERY      HYSTERECTOMY      JOINT REPLACEMENT      SIGMOIDOSCOPY  3/18/15    TONSILLECTOMY      UPPER GASTROINTESTINAL ENDOSCOPY  3/18/15    UPPER GASTROINTESTINAL ENDOSCOPY N/A 7/8/2020    EGD BIOPSY performed by Marisa Gandhi MD at [id number] [facility] ENDOSCOPY       Family History: All family history was reviewed today. History reviewed. No pertinent family history. Objective:       PHYSICAL EXAM:      Vitals:   Vitals:    07/08/20 1038 07/08/20 1043 07/08/20 1045 07/08/20 1200   BP:  (!) 142/65 (!) 142/65 (!) 154/54   Pulse: 98 92 102 107   Resp: 16 15 14 27   Temp: 97.9 °F (36.6 °C) 97.9 °F (36.6 °C)  97.8 °F (36.6 °C)   TempSrc: Temporal Temporal     SpO2:  100% 100%    Weight:       Height:           Physical Exam       General: Encephalopathic but protecting the airway so far  HEENT: normocephalic, atraumatic, sclera clear, pupils equal, light reflex preserved bilaterally  Cardiovascular: RRR, no murmurs/rubs/gallops detected  Pulmonary: CTABL, no rhonchi/rales   Abdomen/GI: soft, no organomegaly, bowel sounds positive  Neuro: Encephalopathic, pupils are equal and reactive to light, moves all extremities  Skin: no rash,   Musculoskeletal:  No obvious joint swelling, redness. No limitation of range of passive motion  Genitourinary:  Garrett's catheter in place Psych: could not assess   Lymphatic/Immunologic: No obvious bruising, no cervical lymphadenopathy    Lines: All vascular access sites are healthy with no local erythema, discharge or tenderness    Intake and output:    I/O last 3 completed shifts: In: 2762 [I.V.:2633; IV Piggyback:129]  Out: 1350 [Urine:1350]    Lab Data:   All available labs and old records have been reviewed by me. CBC:  Recent Labs     07/06/20 0838 07/07/20 1421 07/08/20  0438   WBC 34.5* 33.0* 32.2*   RBC 3.96* 3.19* 2.96*   HGB 11.5* 9.4* 8.4*   HCT 39.5 30.9* 27.4*    227 226   MCV 99.8 96.9 92.5   MCH 29.2 29.3 28.3   MCHC 29.3* 30.3* 30.6*   RDW 18.2* 18.3* 17.1*        BMP:  Recent Labs     07/07/20 2052 07/08/20  0438 07/08/20  1220   * 147* 148*   K 4.3 3.9 3.6   * 115* 115*   CO2 15* 15* 16*   * 138* 123*   CREATININE 4.2* 4.2* 3.7*   CALCIUM 9.2 9.3 9.1   GLUCOSE 137* 128* 126*        Hepatic Function Panel:   Lab Results   Component Value Date    ALKPHOS 122 07/06/2020    ALT 18 07/06/2020    AST 46 07/06/2020    PROT 7.5 07/06/2020    BILITOT 0.4 07/06/2020    LABALBU 3.0 07/06/2020       CPK:   Lab Results   Component Value Date    CKTOTAL 1,552 (H) 07/06/2020     ESR:   Lab Results   Component Value Date    SEDRATE 105 (H) 06/29/2017     CRP: No results found for: CRP        Imaging: All pertinent images and reports for the current visit were reviewed by me during this visit. XR CHEST PORTABLE   Final Result   No acute findings         US RENAL COMPLETE   Final Result   Kidneys are bilaterally small. No gross hydronephrosis. 2.2 cm complex appearing cyst at the lower pole of the right kidney. XR CHEST PORTABLE   Final Result   1. No acute airspace consolidation. 2. Increased right paratracheal opacity, and enlargement of the right hilar   shadow, both possibly representing normal vascular anatomy.   However,   lymphadenopathy or mass lesions in these locations are less likely diagnostic   considerations, and recommend further characterization with a dedicated chest   CT with contrast.             Medications: All current and past medications were reviewed.      piperacillin-tazobactam  3.375 g Intravenous Q12H    pantoprazole  40 mg Intravenous BID    aspirin  81 mg Oral Daily    atorvastatin  40 mg Oral Daily    Vitamin D  1,000 Units Oral Daily    [Held by provider] donepezil  10 mg Oral Nightly    [Held by provider] DULoxetine  30 mg Oral Daily    metoprolol tartrate  25 mg Oral BID    calcium elemental  500 mg Oral Daily    vitamin B-12  50 mcg Oral Daily    sodium chloride flush  10 mL Intravenous 2 times per day    insulin lispro  0-12 Units Subcutaneous Q4H    sodium chloride flush  10 mL Intravenous 2 times per day    sodium chloride  15 mL/kg Intravenous Once    insulin regular  0.1 Units/kg Intravenous Once    lidocaine 1 % injection  5 mL Intradermal Once    sodium chloride flush  10 mL Intravenous 2 times per day    lidocaine 1 % injection  5 mL Intradermal Once    sodium chloride flush  10 mL Intravenous 2 times per day    aspirin  324 mg Oral Once        sodium chloride 100 mL/hr at 07/08/20 0615    dextrose Stopped (07/06/20 0625)    sodium chloride 1,000 mL (07/06/20 1222)    sodium chloride         bisacodyl, albuterol sulfate HFA, sodium chloride flush, acetaminophen **OR** acetaminophen, glucose, dextrose, glucagon (rDNA), dextrose, sodium chloride flush, acetaminophen **OR** acetaminophen, polyethylene glycol, promethazine **OR** ondansetron, sodium chloride flush, sodium chloride flush      Problem list:       Patient Active Problem List   Diagnosis Code    Acute lower GI bleeding K92.2    Chronic blood loss anemia D50.0    D[facility] F03.90    Morbid obesity ([facility]) E66.01    Diabetes mellitus ([facility] E11.9    Closed displaced fracture of right patella S82.001A    Status post total right knee replacement Z96.651    Constipation K59.00  CYNTHIA (acute kidney injury) ([facility] N17.9    DKA, type 1, not at goal [facility]) E10.10    Suspected COVID-19 virus infection Z20.828    Severe sepsis (HCC) A41.9, Z87.68    Metabolic encephalopathy O87.74    Hypernatremia E87.0    Nursing home resident Z59.3    History of arterial ischemic stroke Z86.73    Essential hypertension I10    Mixed hyperlipidemia E78.2    History of depression Z86.59    Diarrhea R19.7       Please note that this chart was generated using Dragon dictation software. Although every effort was made to ensure the accuracy of this automated transcription, some errors in transcription may have occurred inadvertently. If you may need any clarification, please do not hesitate to contact me through EPIC or at the phone number provided below with my electronic signature. Any pictures or media included in this note were obtained after taking informed verbal consent from the patient and with their approval to include those in the patient's medical record.     Anastacio De Oliveira MD, MPH  7/8/2020 , 3:38 PM   [facility] Infectious Disease   Office: [phone]  Fax: [phone]  Tuesday AM clinic:   [address] [place] [address] 120  Thursday AM [id number] [facility]

## 2020-07-08 NOTE — PROGRESS NOTES
Office : [phone]     Fax :[phone]       Nephrology  Note          Chief Complaint:    Chief Complaint   Patient presents with    Altered Mental Status     pt brought by [facility] from [facility] with being found unresponsive by nurse; last seen normal approximately 1.5 hours ago; nurse came in and found patient like this; 35 mcg Fentanyl patch on patient and removed by EMS; given Narcan 2 mg IV by EMS; per EMS, pt has ST-elevation in v2, v3, and aVf         History of Present Ilness:    Lorraine Martínez is a 80 y.o. female with history of dementia  diabetes type 2 with hyperglycemia, gastroesophageal reflux disease, hyperlipidemia, essential hypertension, history of CVA, CKD stage III (baseline creatinine 1.2-1.3), who presents from nursing home with reports that patient was found unresponsive in bed. It is unclear how long patient had been unresponsive, suspected to have been anywhere from 1 to 1.5 hours prior to the time he was found. Paramedics were called and patient was brought to the emergency room, noted to respond to painful stimuli with eyes open in the emergency room, moving all extremities. He did not have much improvement with Narcan. Paramedics also report some abnormal ST depression on telemetry prior to arrival in the emergency room.     Patient has multiple electrolyte abnormalities on presentation to the emergency room, notably creatinine of 5.0 (baseline 1.2-1.3), sodium 150, potassium 5.4, bicarb 17, anion gap 27, leukocytosis of 26.1, troponin 0 0.34, lactic acid 3.3, proBNP 5257. Chest x-ray did not reveal any consolidation; revealed increased paratracheal opacity for which recommendation was made to rule out malignancy.     All history is gathered from medical records     Interval hx     BUN and creatinine trended down   UOP improved   AG 17   HAd EGD today       I/O last 3 completed shifts:   In: 2233 [I.V.:2133; IV Piggyback:100]  Out: 1125 [Urine:1125]  I/O this shift:  In: 250 [I.V.:250]  Out: -         Past Medical History:   Diagnosis Date    Arthritis     Chronic kidney disease     Constipation     D[facility] ([facility]     Depression     [facility]     GERD (gastroesophageal reflux disease)     Glaucoma     Hyperlipidemia     Hypertension     [facility]) [facility]     Muscle weakness (generalized)     Unspecified cerebral artery occlusion with cerebral infarction     UTI (lower urinary tract infection)        Past Surgical History:   Procedure Laterality Date    APPENDECTOMY      BREAST SURGERY      CHOLECYSTECTOMY      COLONOSCOPY      DILATATION, ESOPHAGUS      ENDOSCOPY, COLON, DIAGNOSTIC      EYE SURGERY      HYSTERECTOMY      JOINT REPLACEMENT      SIGMOIDOSCOPY  3/18/15    TONSILLECTOMY      UPPER GASTROINTESTINAL ENDOSCOPY  3/18/15         Current Medications:    0.45 % sodium chloride infusion, Continuous  piperacillin-tazobactam (ZOSYN) 3.375 g in dextrose 5 % 50 mL IVPB extended infusion (mini-bag), Q12H  pantoprazole (PROTONIX) injection 40 mg, BID  aspirin EC tablet 81 mg, Daily  atorvastatin (LIPITOR) tablet 40 mg, Daily  bisacodyl (DULCOLAX) suppository 10 mg, Daily PRN  Vitamin D (CHOLECALCIFEROL) tablet 1,000 Units, Daily  [Held by provider] donepezil (ARICEPT) tablet 10 mg, Nightly  [Held by provider] DULoxetine (CYMBALTA) extended release capsule 30 mg, Daily  metoprolol tartrate (LOPRESSOR) tablet 25 mg, BID  calcium elemental (OSCAL) tablet 500 mg, Daily  vitamin B-12 (CYANOCOBALAMIN) tablet 50 mcg, Daily  albuterol sulfate  (90 Base) MCG/ACT inhaler 2 puff, Q4H PRN  sodium chloride flush 0.9 % injection 10 mL, 2 times per day  sodium chloride flush 0.9 % injection 10 mL, PRN  acetaminophen (TYLENOL) tablet 650 mg, Q6H PRN    Or  acetaminophen (TYLENOL) suppository 650 mg, Q6H PRN  glucose (GLUTOSE) 40 % oral gel 15 g, PRN  dextrose 50 % IV solution, PRN  glucagon (rDNA) injection 1 mg, PRN  dextrose 5 % solution, PRN  insulin lispro (1 Unit Dial) 0-12 Units, Q4H  0.9 % sodium chloride infusion, Continuous  sodium chloride flush 0.9 % injection 10 mL, 2 times per day  sodium chloride flush 0.9 % injection 10 mL, PRN  acetaminophen (TYLENOL) tablet 650 mg, Q6H PRN    Or  acetaminophen (TYLENOL) suppository 650 mg, Q6H PRN  polyethylene glycol (GLYCOLAX) packet 17 g, Daily PRN  promethazine (PHENERGAN) tablet 12.5 mg, Q6H PRN    Or  ondansetron (ZOFRAN) injection 4 mg, Q6H PRN  0.9 % sodium chloride bolus, Once    Followed by  0.9 % sodium chloride infusion, Continuous  insulin regular (HUMULIN R;NOVOLIN R) injection 8 Units, Once  lidocaine PF 1 % injection 5 mL, Once  sodium chloride flush 0.9 % injection 10 mL, 2 times per day  sodium chloride flush 0.9 % injection 10 mL, PRN  lidocaine PF 1 % injection 5 mL, Once  sodium chloride flush 0.9 % injection 10 mL, 2 times per day  sodium chloride flush 0.9 % injection 10 mL, PRN  aspirin chewable tablet 324 mg, Once      Physical exam:     Vitals:  BP (!) 142/65   Pulse 92   Temp 97.9 °F (36.6 °C) (Temporal)   Resp 15   Ht 5' 5\" (1.651 m)   Wt 167 lb 15.9 oz (76.2 kg)   SpO2 100%   BMI 27.96 kg/m²   Constitutional:  Spontaneous eye opening , responding to simple commands   Skin: no rash, turgor - oral mucosa dry   Heent:  eomi, mmm  Neck: no bruits or jvd noted  Cardiovascular:  S1, S2 without m/r/g  Respiratory: CTA B without w/r/r  Abdomen:  +bs, soft, nt, nd  Ext: no  lower extremity edema    Labs:  CBC:   Recent Labs     07/06/20  0838 07/07/20  1421 07/08/20  0438   WBC 34.5* 33.0* 32.2*   HGB 11.5* 9.4* 8.4*    227 226     BMP:    Recent Labs     07/07/20  1420 07/07/20 2052 07/08/20 0438   * 150* 147*   K 4.2 considerations, and recommend further characterization with a dedicated chest   CT with contrast.             I/O last 3 completed shifts: In: 2233 [I.V.:2133; IV Piggyback:100]  Out: 9029 [Urine:1125]      Assessment/Plan :      1. CYNTHIA. pre renal   Hold losartan, metformin   CYNTHIA improving slowly   AG is 17  UOP is improving   Recommend to dose adjust all medications  based on renal functions  Maintain SBP> 90 mmHg   Daily weights   AVOID NSAIDs  Avoid Nephrotoxins  Monitor Intake/Output      2. HTN. Controlled BP     3. High anion gap acidosis 2/2 CYNTHIA   AG  30----> 16 ---> 17   Hold metformin     4. AMS 2/2 to metabolic encephalopathy   Hold namenda , aricept , cymbalata till more awake and alert     5. Hypernatremia - 2/2 hypovolemia.  Continue iv fluids     Strict I/O    continue 1/2 NS               Thank you for allowing us to participate in care of Jessica Etienne         Electronically signed by: Leonor Whitt MD, 7/8/2020, 11:35 AM    Cc time 35 minutes     [facility] [address]  Office : [phone]  Fax :[phone]

## 2020-07-08 NOTE — BRIEF OP NOTE
Brief Postoperative Note      Patient: Zamzam Zamudio  YOB: 1933  MRN: [id number]    Date of Procedure: 7/8/2020    Pre-Op Diagnosis: Anemia         Procedure(s):  EGD BIOPSY    Surgeon(s):  Ilan Nugent MD    Anesthesia: Monitor Anesthesia Care    Estimated Blood Loss (mL): Minimal    Complications: None    Specimens:   ID Type Source Tests Collected by Time Destination   A : bx gastric r/o HP Tissue Tissue SURGICAL PATHOLOGY Ilan Nugent MD 7/8/2020 1009      Findings:   3 cm HH, moderate gastritis with heme specks, biopsied (to r/o HP). Normal duodenum. No active upper GI bleed. Plan   Await biopsies. IV PPI X 72 hours, then switch to oral PPI BID X 8 weeks and then daily thereafter. No NSAIDs. I also spoke with sister Mike Ghotra at [phone]) and discussed findings/plan as documented above.     Electronically signed by Ilan Nugent MD on 7/8/2020 at 10:19 AM

## 2020-07-08 NOTE — PROGRESS NOTES
Pt DNR-CC, called and spoke with pt's sister, Hanane Vivar [employer] [facility]) regarding DNR-CC status during procedure. Educated her on the risk of procedure and pt's code status. Pt's sister and POA verbalized understanding and agreed to suspending [facility] code status during procedure and recovery. Witnessed via phone consent by this RN and Chrystal Hernadez CRNA.

## 2020-07-08 NOTE — PROGRESS NOTES
Remains up in chair. Reassessed with no acute changes noted. Alert to name only, continues to follow simple commands & occasionally responds with 1 to 2 words. VSS  Monitor ST  Lungs diminished t/o. Nods head no when asked if any pain. Refuses oral care.

## 2020-07-09 LAB
ANION GAP SERPL CALCULATED.3IONS-SCNC: 13 MMOL/L (ref 3–16)
ANION GAP SERPL CALCULATED.3IONS-SCNC: 14 MMOL/L (ref 3–16)
ANION GAP SERPL CALCULATED.3IONS-SCNC: 15 MMOL/L (ref 3–16)
BASOPHILS ABSOLUTE: 0 K/UL (ref 0–0.2)
BASOPHILS ABSOLUTE: 0 K/UL (ref 0–0.2)
BASOPHILS RELATIVE PERCENT: 0.1 %
BASOPHILS RELATIVE PERCENT: 0.1 %
BILIRUB SERPL-MCNC: 0.3 MG/DL (ref 0–1)
BILIRUBIN DIRECT: <0.2 MG/DL (ref 0–0.3)
BILIRUBIN, INDIRECT: NORMAL MG/DL (ref 0–1)
BLOOD CULTURE, ROUTINE: NORMAL
BLOOD SMEAR REVIEW: NORMAL
BUN BLDV-MCNC: 102 MG/DL (ref 7–20)
BUN BLDV-MCNC: 91 MG/DL (ref 7–20)
BUN BLDV-MCNC: 95 MG/DL (ref 7–20)
CALCIUM SERPL-MCNC: 8.3 MG/DL (ref 8.3–10.6)
CALCIUM SERPL-MCNC: 8.3 MG/DL (ref 8.3–10.6)
CALCIUM SERPL-MCNC: 8.5 MG/DL (ref 8.3–10.6)
CHLORIDE BLD-SCNC: 117 MMOL/L (ref 99–110)
CHLORIDE BLD-SCNC: 118 MMOL/L (ref 99–110)
CHLORIDE BLD-SCNC: 119 MMOL/L (ref 99–110)
CO2: 16 MMOL/L (ref 21–32)
CO2: 16 MMOL/L (ref 21–32)
CO2: 17 MMOL/L (ref 21–32)
CREAT SERPL-MCNC: 3.3 MG/DL (ref 0.6–1.2)
CREAT SERPL-MCNC: 3.4 MG/DL (ref 0.6–1.2)
CREAT SERPL-MCNC: 3.7 MG/DL (ref 0.6–1.2)
CULTURE, BLOOD 2: NORMAL
EOSINOPHILS ABSOLUTE: 0.2 K/UL (ref 0–0.6)
EOSINOPHILS ABSOLUTE: 0.2 K/UL (ref 0–0.6)
EOSINOPHILS RELATIVE PERCENT: 1 %
EOSINOPHILS RELATIVE PERCENT: 1.4 %
GFR AFRICAN AMERICAN: 14
GFR AFRICAN AMERICAN: 15
GFR AFRICAN AMERICAN: 16
GFR NON-AFRICAN AMERICAN: 12
GFR NON-AFRICAN AMERICAN: 13
GFR NON-AFRICAN AMERICAN: 13
GLUCOSE BLD-MCNC: 110 MG/DL (ref 70–99)
GLUCOSE BLD-MCNC: 116 MG/DL (ref 70–99)
GLUCOSE BLD-MCNC: 117 MG/DL (ref 70–99)
GLUCOSE BLD-MCNC: 121 MG/DL (ref 70–99)
GLUCOSE BLD-MCNC: 125 MG/DL (ref 70–99)
GLUCOSE BLD-MCNC: 145 MG/DL (ref 70–99)
GLUCOSE BLD-MCNC: 159 MG/DL (ref 70–99)
GLUCOSE BLD-MCNC: 170 MG/DL (ref 70–99)
GLUCOSE BLD-MCNC: 188 MG/DL (ref 70–99)
HCT VFR BLD CALC: 23 % (ref 36–48)
HCT VFR BLD CALC: 23.2 % (ref 36–48)
HCT VFR BLD CALC: 24.2 % (ref 36–48)
HCT VFR BLD CALC: 24.6 % (ref 36–48)
HCT VFR BLD CALC: 24.7 % (ref 36–48)
HEMATOLOGY PATH CONSULT: YES
HEMOGLOBIN: 7.2 G/DL (ref 12–16)
HEMOGLOBIN: 7.3 G/DL (ref 12–16)
HEMOGLOBIN: 7.8 G/DL (ref 12–16)
HEMOGLOBIN: 7.8 G/DL (ref 12–16)
IMMATURE RETIC FRACT: 0.54 (ref 0.21–0.37)
LACTATE DEHYDROGENASE: 292 U/L (ref 100–190)
LYMPHOCYTES ABSOLUTE: 0.7 K/UL (ref 1–5.1)
LYMPHOCYTES ABSOLUTE: 0.8 K/UL (ref 1–5.1)
LYMPHOCYTES RELATIVE PERCENT: 4.3 %
LYMPHOCYTES RELATIVE PERCENT: 4.5 %
MAGNESIUM: 1.6 MG/DL (ref 1.8–2.4)
MCH RBC QN AUTO: 29.4 PG (ref 26–34)
MCH RBC QN AUTO: 29.4 PG (ref 26–34)
MCHC RBC AUTO-ENTMCNC: 31.5 G/DL (ref 31–36)
MCHC RBC AUTO-ENTMCNC: 31.5 G/DL (ref 31–36)
MCV RBC AUTO: 93.2 FL (ref 80–100)
MCV RBC AUTO: 93.4 FL (ref 80–100)
MONOCYTES ABSOLUTE: 0.4 K/UL (ref 0–1.3)
MONOCYTES ABSOLUTE: 0.4 K/UL (ref 0–1.3)
MONOCYTES RELATIVE PERCENT: 2.1 %
MONOCYTES RELATIVE PERCENT: 2.5 %
NEUTROPHILS ABSOLUTE: 16.1 K/UL (ref 1.7–7.7)
NEUTROPHILS ABSOLUTE: 16.1 K/UL (ref 1.7–7.7)
NEUTROPHILS RELATIVE PERCENT: 91.9 %
NEUTROPHILS RELATIVE PERCENT: 92.1 %
PDW BLD-RTO: 17.5 % (ref 12.4–15.4)
PDW BLD-RTO: 17.6 % (ref 12.4–15.4)
PERFORMED ON: ABNORMAL
PLATELET # BLD: 178 K/UL (ref 135–450)
PLATELET # BLD: 196 K/UL (ref 135–450)
PMV BLD AUTO: 9.6 FL (ref 5–10.5)
PMV BLD AUTO: 9.8 FL (ref 5–10.5)
POTASSIUM SERPL-SCNC: 3 MMOL/L (ref 3.5–5.1)
POTASSIUM SERPL-SCNC: 3.1 MMOL/L (ref 3.5–5.1)
POTASSIUM SERPL-SCNC: 3.2 MMOL/L (ref 3.5–5.1)
RBC # BLD: 2.49 M/UL (ref 4–5.2)
RBC # BLD: 2.64 M/UL (ref 4–5.2)
RETICULOCYTE ABSOLUTE COUNT: 0.08 M/UL (ref 0.02–0.1)
RETICULOCYTE COUNT PCT: 2.95 % (ref 0.5–2.18)
SODIUM BLD-SCNC: 148 MMOL/L (ref 136–145)
SODIUM BLD-SCNC: 148 MMOL/L (ref 136–145)
SODIUM BLD-SCNC: 149 MMOL/L (ref 136–145)
WBC # BLD: 17.5 K/UL (ref 4–11)
WBC # BLD: 17.5 K/UL (ref 4–11)

## 2020-07-09 PROCEDURE — 99233 SBSQ HOSP IP/OBS HIGH 50: CPT | Performed by: INTERNAL MEDICINE

## 2020-07-09 PROCEDURE — 83735 ASSAY OF MAGNESIUM: CPT

## 2020-07-09 PROCEDURE — 84165 PROTEIN E-PHORESIS SERUM: CPT

## 2020-07-09 PROCEDURE — 1200000000 HC SEMI PRIVATE

## 2020-07-09 PROCEDURE — 83550 IRON BINDING TEST: CPT

## 2020-07-09 PROCEDURE — 6360000002 HC RX W HCPCS: Performed by: PHYSICIAN ASSISTANT

## 2020-07-09 PROCEDURE — 97129 THER IVNTJ 1ST 15 MIN: CPT

## 2020-07-09 PROCEDURE — 85018 HEMOGLOBIN: CPT

## 2020-07-09 PROCEDURE — 83540 ASSAY OF IRON: CPT

## 2020-07-09 PROCEDURE — 83010 ASSAY OF HAPTOGLOBIN QUANT: CPT

## 2020-07-09 PROCEDURE — 80048 BASIC METABOLIC PNL TOTAL CA: CPT

## 2020-07-09 PROCEDURE — 84155 ASSAY OF PROTEIN SERUM: CPT

## 2020-07-09 PROCEDURE — 82746 ASSAY OF FOLIC ACID SERUM: CPT

## 2020-07-09 PROCEDURE — 97530 THERAPEUTIC ACTIVITIES: CPT

## 2020-07-09 PROCEDURE — 6360000002 HC RX W HCPCS: Performed by: HOSPITALIST

## 2020-07-09 PROCEDURE — 82247 BILIRUBIN TOTAL: CPT

## 2020-07-09 PROCEDURE — 85025 COMPLETE CBC W/AUTO DIFF WBC: CPT

## 2020-07-09 PROCEDURE — C9113 INJ PANTOPRAZOLE SODIUM, VIA: HCPCS | Performed by: PHYSICIAN ASSISTANT

## 2020-07-09 PROCEDURE — 85014 HEMATOCRIT: CPT

## 2020-07-09 PROCEDURE — 6370000000 HC RX 637 (ALT 250 FOR IP): Performed by: INTERNAL MEDICINE

## 2020-07-09 PROCEDURE — 92526 ORAL FUNCTION THERAPY: CPT

## 2020-07-09 PROCEDURE — 6360000002 HC RX W HCPCS: Performed by: INTERNAL MEDICINE

## 2020-07-09 PROCEDURE — 2580000003 HC RX 258: Performed by: INTERNAL MEDICINE

## 2020-07-09 PROCEDURE — 94760 N-INVAS EAR/PLS OXIMETRY 1: CPT

## 2020-07-09 PROCEDURE — 82248 BILIRUBIN DIRECT: CPT

## 2020-07-09 PROCEDURE — 2580000003 HC RX 258: Performed by: HOSPITALIST

## 2020-07-09 PROCEDURE — 87086 URINE CULTURE/COLONY COUNT: CPT

## 2020-07-09 PROCEDURE — 36415 COLL VENOUS BLD VENIPUNCTURE: CPT

## 2020-07-09 PROCEDURE — 83615 LACTATE (LD) (LDH) ENZYME: CPT

## 2020-07-09 PROCEDURE — 82728 ASSAY OF FERRITIN: CPT

## 2020-07-09 PROCEDURE — 6370000000 HC RX 637 (ALT 250 FOR IP): Performed by: HOSPITALIST

## 2020-07-09 PROCEDURE — 82607 VITAMIN B-12: CPT

## 2020-07-09 PROCEDURE — 85045 AUTOMATED RETICULOCYTE COUNT: CPT

## 2020-07-09 RX ORDER — POTASSIUM CHLORIDE 7.45 MG/ML
10 INJECTION INTRAVENOUS PRN
Status: DISCONTINUED | OUTPATIENT
Start: 2020-07-09 | End: 2020-07-09

## 2020-07-09 RX ORDER — MAGNESIUM SULFATE IN WATER 40 MG/ML
2 INJECTION, SOLUTION INTRAVENOUS ONCE
Status: COMPLETED | OUTPATIENT
Start: 2020-07-09 | End: 2020-07-09

## 2020-07-09 RX ORDER — POTASSIUM CHLORIDE 20 MEQ/1
40 TABLET, EXTENDED RELEASE ORAL PRN
Status: DISCONTINUED | OUTPATIENT
Start: 2020-07-09 | End: 2020-07-09

## 2020-07-09 RX ADMIN — MAGNESIUM SULFATE 2 G: 2 INJECTION INTRAVENOUS at 14:33

## 2020-07-09 RX ADMIN — DESMOPRESSIN ACETATE 40 MG: 0.2 TABLET ORAL at 08:51

## 2020-07-09 RX ADMIN — SODIUM CHLORIDE 1000 ML: 9 INJECTION, SOLUTION INTRAVENOUS at 04:49

## 2020-07-09 RX ADMIN — Medication 10 ML: at 08:52

## 2020-07-09 RX ADMIN — METOPROLOL TARTRATE 25 MG: 25 TABLET, FILM COATED ORAL at 08:51

## 2020-07-09 RX ADMIN — INSULIN LISPRO 2 UNITS: 100 INJECTION, SOLUTION INTRAVENOUS; SUBCUTANEOUS at 01:04

## 2020-07-09 RX ADMIN — PANTOPRAZOLE SODIUM 40 MG: 40 INJECTION, POWDER, FOR SOLUTION INTRAVENOUS at 20:38

## 2020-07-09 RX ADMIN — INSULIN LISPRO 2 UNITS: 100 INJECTION, SOLUTION INTRAVENOUS; SUBCUTANEOUS at 05:13

## 2020-07-09 RX ADMIN — Medication 10 ML: at 20:44

## 2020-07-09 RX ADMIN — POTASSIUM BICARBONATE 40 MEQ: 782 TABLET, EFFERVESCENT ORAL at 14:40

## 2020-07-09 RX ADMIN — Medication 10 ML: at 20:43

## 2020-07-09 RX ADMIN — PANTOPRAZOLE SODIUM 40 MG: 40 INJECTION, POWDER, FOR SOLUTION INTRAVENOUS at 08:51

## 2020-07-09 RX ADMIN — METOPROLOL TARTRATE 25 MG: 25 TABLET, FILM COATED ORAL at 20:38

## 2020-07-09 RX ADMIN — INSULIN LISPRO 2 UNITS: 100 INJECTION, SOLUTION INTRAVENOUS; SUBCUTANEOUS at 17:19

## 2020-07-09 RX ADMIN — Medication 50 MCG: at 08:52

## 2020-07-09 RX ADMIN — CALCIUM 500 MG: 500 TABLET ORAL at 08:51

## 2020-07-09 RX ADMIN — PIPERACILLIN AND TAZOBACTAM 3.38 G: 3; .375 INJECTION, POWDER, LYOPHILIZED, FOR SOLUTION INTRAVENOUS at 11:08

## 2020-07-09 RX ADMIN — ASPIRIN 81 MG: 81 TABLET, COATED ORAL at 08:51

## 2020-07-09 RX ADMIN — VITAMIN D, TAB 1000IU (100/BT) 1000 UNITS: 25 TAB at 08:51

## 2020-07-09 ASSESSMENT — PAIN SCALES - WONG BAKER
WONGBAKER_NUMERICALRESPONSE: 0

## 2020-07-09 ASSESSMENT — PAIN SCALES - GENERAL
PAINLEVEL_OUTOF10: 0

## 2020-07-09 NOTE — H&P
Oncology Hematology Care   CONSULT NOTE    7/9/2020 3:44 PM    Patient Information: Gonzalo Hanson   Date of Admit:  7/5/2020  Primary Care Physician:  oM Jenkins MD  Requesting Physician:  Otis Wilder MD    Reason for consult:   Evaluation and recommendations for leukocytosis. Chief complaint:    Chief Complaint   Patient presents with    Altered Mental Status     pt brought by [facility] from [facility] with being found unresponsive by nurse; last seen normal approximately 1.5 hours ago; nurse came in and found patient like this; 35 mcg Fentanyl patch on patient and removed by EMS; given Narcan 2 mg IV by EMS; per EMS, pt has ST-elevation in v2, v3, and aVf       History of Present Illness:  Gonzalo Hanson is a 80 y.o. female on Otis Wilder MD service who was admitted on 7/5/2020 for altered mental status. She was found unresponsive at the nursing home where she lives. She is being treated for acute metabolic encephalopathy, CYNTHIA, sepsis, ARF wth hypoxia, and hypernatremia. She has baseline dementia, is non-interactive and mostly bedridden. She ws found to have leukocytosis and anemia upon admission. She has chronic anemia and takes PO ferrous sulfate. She is reported to have black stools. EGD did not find active bleeding. Her family declined colonoscopy. She has no prior history of leukocytosis. No fevers or chills. No night sweats or palpable lymphadenopathy. No recent weight loss. Past Medical History:     has a past medical history of Arthritis, Chronic kidney disease, Constipation, Dementia ([address] Depression, Diabetes mellitus ([address] GERD (gastroesophageal reflux disease), Glaucoma, Hyperlipidemia, Hypertension, MI (myocardial infarction) ([address] Muscle weakness (generalized), Unspecified cerebral artery occlusion with cerebral infarction, and UTI (lower urinary tract infection).      Past Surgical History:    Past Surgical History:   Procedure Laterality Date    APPENDECTOMY  BREAST SURGERY      CHOLECYSTECTOMY      COLONOSCOPY      DILATATION, ESOPHAGUS      ENDOSCOPY, COLON, DIAGNOSTIC      EYE SURGERY      HYSTERECTOMY      JOINT REPLACEMENT      SIGMOIDOSCOPY  3/18/15    TONSILLECTOMY      UPPER GASTROINTESTINAL ENDOSCOPY  3/18/15    UPPER GASTROINTESTINAL ENDOSCOPY N/A 7/8/2020    EGD BIOPSY performed by Christian Carvajal MD at [id number] [facility] ENDOSCOPY        Current Medications:     pantoprazole  40 mg Intravenous BID    aspirin  81 mg Oral Daily    atorvastatin  40 mg Oral Daily    Vitamin D  1,000 Units Oral Daily    [Held by provider] donepezil  10 mg Oral Nightly    [Held by provider] DULoxetine  30 mg Oral Daily    metoprolol tartrate  25 mg Oral BID    calcium elemental  500 mg Oral Daily    vitamin B-12  50 mcg Oral Daily    sodium chloride flush  10 mL Intravenous 2 times per day    insulin lispro  0-12 Units Subcutaneous Q4H    sodium chloride flush  10 mL Intravenous 2 times per day    sodium chloride  15 mL/kg Intravenous Once    insulin regular  0.1 Units/kg Intravenous Once    lidocaine 1 % injection  5 mL Intradermal Once    sodium chloride flush  10 mL Intravenous 2 times per day    lidocaine 1 % injection  5 mL Intradermal Once    sodium chloride flush  10 mL Intravenous 2 times per day    aspirin  324 mg Oral Once       Allergies:    No Known Allergies     Social History:    reports that she has quit smoking. She has a 3.75 pack-year smoking history. She has never used smokeless tobacco. She reports that she does not drink alcohol or use drugs. Family History:     family history is not on file.      ROS:      Per HPI; otherwise 10 point ROS is negative    PHYSICAL EXAM:    Vitals:  Vitals:    07/09/20 1200   BP: 132/66   Pulse: 80   Resp: 18   Temp: 97.9 °F (36.6 °C)   SpO2: 99%        Intake/Output Summary (Last 24 hours) at 7/9/2020 1544  Last data filed at 7/9/2020 0504  Gross per 24 hour   Intake 1362 ml   Output 770 ml   Net 592 ml Wt Readings from Last 3 Encounters:   07/09/20 168 lb 6.9 oz (76.4 kg)   07/13/18 170 lb 14.4 oz (77.5 kg)   02/28/18 163 lb (73.9 kg)        General appearance: Appears comfortable. Eyes: Sclera clear, pupils equal  ENT: Moist mucus membranes, no thrush  Neck: Trachea midline, symmetrical  Cardiovascular: Regular rhythm, normal S1, S2. No murmur, gallop, rub. Respiratory: Clear to auscultation bilaterally. No wheeze. Good inspiratory effort  Gastrointestinal: Abdomen soft, not tender, not distended, normal bowel sounds  Musculoskeletal: No cyanosis in digits, warm extremities  Skin: Warm, dry, normal turgor, no rash    DATA:  CBC:   Lab Results   Component Value Date    WBC 17.5 07/09/2020    RBC 2.49 07/09/2020    HGB 7.2 07/09/2020    HGB 7.3 07/09/2020    HCT 23.0 07/09/2020    HCT 23.2 07/09/2020    MCV 93.4 07/09/2020    MCH 29.4 07/09/2020    MCHC 31.5 07/09/2020    RDW 17.5 07/09/2020     07/09/2020    MPV 9.8 07/09/2020     BMP:  Lab Results   Component Value Date     07/09/2020    K 3.2 07/09/2020    K 5.4 07/05/2020     07/09/2020    CO2 17 07/09/2020     07/09/2020    CREATININE 3.7 07/09/2020    CALCIUM 8.5 07/09/2020    GFRAA 14 07/09/2020    LABGLOM 12 07/09/2020    GLUCOSE 110 07/09/2020     Magnesium:   Lab Results   Component Value Date    MG 1.60 07/09/2020    MG 1.80 07/08/2020    MG 2.20 07/07/2020     LIVER PROFILE: No results for input(s): AST, ALT, LIPASE, BILIDIR, BILITOT, ALKPHOS in the last 72 hours. Invalid input(s):   AMYLASE,  ALB  PT/INR:    Lab Results   Component Value Date    PROTIME 13.3 07/05/2020    PROTIME 11.4 07/13/2018    PROTIME 11.0 03/18/2015    INR 1.14 07/05/2020    INR 1.00 07/13/2018    INR 1.02 03/18/2015       IMPRESSION/RECOMMENDATIONS:    Active Problems:    CYNTHIA (acute kidney injury) ([facility]    DKA, type 1, not at goal [facility])    Suspected COVID-19 virus infection    Severe sepsis ([facility]    Metabolic encephalopathy    Hypernatremia Nursing home resident    History of arterial ischemic stroke    Essential hypertension    Mixed hyperlipidemia    History of depression    Diarrhea  Resolved Problems:    * No resolved hospital problems. *       80 year old female with a history of dementia, MI, hyperlipidemia, GERD, DM, and CKD. She's admitted with sepsis, metabolic encephalopathy, and CYNTHIA. She has:    1. Leukocytosis:  - WBC was normal upon admission, trending down. - Peripheral smear to be reviewed. 2. Anemia:  - Likely multifactorial due to CKD and chronic GI bleed. History of BECKY. - No bleeding found on EGD, family declined colonoscopy at this time. - Will check iron studies, B12, folate and SPEP. - Workup for hemolysis. - Transfuse if hgb less than 7. This plan was discussed with the patient and he/she verbalized understanding. Thank you for allowing us to participate in the care of this patient. Rama Shelton, [address] [facility] [place]  Oncology Hematology [facility], [facility].  [phone]    Patient was seen and examined. Agree with above. Leukocytosis appears to be reactive. Peripheral smear showed mainly neutrophilia. No blasts or premature cells noted. Check anemia work-up.     Tayla Collins MD

## 2020-07-09 NOTE — PLAN OF CARE
Problem: Falls - Risk of:  Goal: Will remain free from falls  Description: Will remain free from falls  7/8/2020 2059 by Garrett Chavez RN  Outcome: Ongoing  7/8/2020 1102 by Dwight Goldberg RN  Outcome: Ongoing  Goal: Absence of physical injury  Description: Absence of physical injury  7/8/2020 2059 by Garrett Chavez RN  Outcome: Ongoing  7/8/2020 1102 by Dwight Goldberg RN  Outcome: Ongoing     Problem: Skin Integrity:  Goal: Will show no infection signs and symptoms  Description: Will show no infection signs and symptoms  7/8/2020 2059 by Garrett Chavez RN  Outcome: Ongoing  7/8/2020 1102 by Dwight Goldberg RN  Outcome: Ongoing  Goal: Absence of new skin breakdown  Description: Absence of new skin breakdown  7/8/2020 2059 by Garrett Chavez RN  Outcome: Ongoing  7/8/2020 1102 by Dwight Goldberg RN  Outcome: Ongoing     Problem: Confusion - Acute:  Goal: Absence of continued neurological deterioration signs and symptoms  Description: Absence of continued neurological deterioration signs and symptoms  7/8/2020 2059 by Garrett Chavez RN  Outcome: Ongoing  7/8/2020 1102 by Dwight Goldberg RN  Outcome: Ongoing  Goal: Mental status will be restored to baseline  Description: Mental status will be restored to baseline  7/8/2020 2059 by Garrett Chavez RN  Outcome: Ongoing  7/8/2020 1102 by Dwight Goldberg RN  Outcome: Ongoing     Problem: Discharge Planning:  Goal: Ability to perform activities of daily living will improve  Description: Ability to perform activities of daily living will improve  7/8/2020 2059 by Garrett Chavez RN  Outcome: Ongoing  7/8/2020 1102 by Dwight Goldberg RN  Outcome: Ongoing  Goal: Participates in care planning  Description: Participates in care planning  7/8/2020 2059 by Garrett Chavez RN  Outcome: Ongoing  7/8/2020 1102 by Dwight Goldberg RN  Outcome: Ongoing     Problem: Injury - Risk of, Physical Injury:  Goal: Will remain free from falls  Description: Will remain free from falls  7/8/2020 2059 by Parmjit Flower RN  Outcome: Ongoing  7/8/2020 1102 by Terrence Martinez RN  Outcome: Ongoing  Goal: Absence of physical injury  Description: Absence of physical injury  7/8/2020 2059 by Parmjit Flower RN  Outcome: Ongoing  7/8/2020 1102 by Terrence Martinez RN  Outcome: Ongoing     Problem: Mood - Altered:  Goal: Mood stable  Description: Mood stable  7/8/2020 2059 by Parmjit Flower RN  Outcome: Ongoing  7/8/2020 1102 by Terrence Martinez RN  Outcome: Ongoing  Goal: Absence of abusive behavior  Description: Absence of abusive behavior  7/8/2020 2059 by Parmjit Flower RN  Outcome: Ongoing  7/8/2020 1102 by Terrence Martinez RN  Outcome: Ongoing  Goal: Verbalizations of feeling emotionally comfortable while being cared for will increase  Description: Verbalizations of feeling emotionally comfortable while being cared for will increase  7/8/2020 2059 by Parmjit Flower RN  Outcome: Ongoing  7/8/2020 1102 by Terrence Martinez RN  Outcome: Ongoing     Problem: Psychomotor Activity - Altered:  Goal: Absence of psychomotor disturbance signs and symptoms  Description: Absence of psychomotor disturbance signs and symptoms  7/8/2020 2059 by Parmjit Flower RN  Outcome: Ongoing  7/8/2020 1102 by Terrence Martinez RN  Outcome: Ongoing     Problem: Sensory Perception - Impaired:  Goal: Demonstrations of improved sensory functioning will increase  Description: Demonstrations of improved sensory functioning will increase  7/8/2020 2059 by Parmjit Flower RN  Outcome: Ongoing  7/8/2020 1102 by Terrence Martinez RN  Outcome: Ongoing  Goal: Decrease in sensory misperception frequency  Description: Decrease in sensory misperception frequency  7/8/2020 2059 by Parmjit Flower RN  Outcome: Ongoing  7/8/2020 1102 by Terrence Martinez RN  Outcome: Ongoing  Goal: Able to refrain from responding to false sensory perceptions  Description: Able to refrain from responding to false sensory perceptions  7/8/2020 2059 by Parmjit Flower RN  Outcome: Ongoing  7/8/2020 1102 by Ketan Lucia RN  Outcome: Ongoing  Goal: Demonstrates accurate environmental perceptions  Description: Demonstrates accurate environmental perceptions  7/8/2020 2059 by Bernarda De La Fuente RN  Outcome: Ongoing  7/8/2020 1102 by Ketan Lucia RN  Outcome: Ongoing  Goal: Able to distinguish between reality-based and nonreality-based thinking  Description: Able to distinguish between reality-based and nonreality-based thinking  7/8/2020 2059 by Bernarda De La Fuente RN  Outcome: Ongoing  7/8/2020 1102 by Ketan Lucia RN  Outcome: Ongoing  Goal: Able to interrupt nonreality-based thinking  Description: Able to interrupt nonreality-based thinking  7/8/2020 2059 by Bernarda De La Fuente RN  Outcome: Ongoing  7/8/2020 1102 by Ketan Lucia RN  Outcome: Ongoing     Problem: Sleep Pattern Disturbance:  Goal: Appears well-rested  Description: Appears well-rested  7/8/2020 2059 by Bernarda De La Fuente RN  Outcome: Ongoing  7/8/2020 1102 by Ketan Lucia RN  Outcome: Ongoing

## 2020-07-09 NOTE — PROGRESS NOTES
Speech Language Pathology  Dysphagia Treatment Note    Name:  Barak Alvarez  :   1933  Medical Diagnosis:  Acute kidney injury ([facility] [N17.9]  Acute kidney injury ([facility] [N17.9]  DKA, type 1, not at goal [facility]) [E10.10]  Treatment Diagnosis: Oropharyngeal Dysphagia  Pain level:  Pt denies pain at this time    Current Diet Level:   1)Advance diet to Dysphagia I Pureed with NECTAR thick liquids/no straws/meds (crushed) with applesauce  2)Allow po only when Pt demonstrates sustained alertness and as tolerated    Assessment of Texture Tolerance:  -Impressions: Nurse reports Pt much more lethargic and less verbally responsive this date. Pt sleeping upon my arrival but was awakened with verbal prompting. When awakened, Pt intermittently verbally responsive to direct questions but demonstrates intermittent staring and demonstrates delayed verbal responsiveness. Pt accepting of po trials of thin liquid, nectar and puree textures. Poor bolus control and A-P propulsion noted with all textures. Rapid and premature bolus loss to pharynx with clinical symptoms of penetration/aspiration and delayed cough noted with thin liquids. Prolonged A-P propulsion with progressive delays in swallow initiation noted with nectar thick and purees textures. No overt signs of aspiration noted with these textures but po became more lethargic over successive po trials. Diet and Treatment Recommendations:  1) Continue Dysphagia I Pureed with NECTAR thick liquids/no straws/meds (crushed) with applesauce  2)Allow po only when Pt demonstrates sustained alertness and as tolerated    Cognitive Assessment/Treatment Goals:  Impressions: Pt awake but less responsive this date. Pt oriented to person but unable to verbalize location or retain orientation information following 5 min delay. Intermittent staring also noted throughout treatment session.    Goals:  1)Pt will improve orientation to x4, for improved awareness of surroundings and reduced confusion (Ongoing 7/9/20)  2)Pt will improve short term recall of daily events and newly learned information via graded tasks, to 50%, for improved safety with dysphagia recommendations. (Ongoing 7/9/20)    Plan:  Continued daily Dysphagia treatment with goals per plan of care. Patient/Family Education:Education given to the Pt and nurse, who verbalized understanding    Discharge Recommendations:  Pt will benefit from continued skilled Speech Therapy for Dysphagia services, prior to returning home. Timed Code Treatment: 10 min    Total Treatment Time: 25 min    If patient discharges prior to next session this note will serve as a discharge summary.      Jose Miguel Del Valle [id number]

## 2020-07-09 NOTE — PROGRESS NOTES
Office : [phone]     Fax :[phone]       Nephrology  Note          Chief Complaint:    Chief Complaint   Patient presents with    Altered Mental Status     pt brought by [facility] from [facility] with being found unresponsive by nurse; last seen normal approximately 1.5 hours ago; nurse came in and found patient like this; 35 mcg Fentanyl patch on patient and removed by EMS; given Narcan 2 mg IV by EMS; per EMS, pt has ST-elevation in v2, v3, and aVf         History of Present Ilness:    Sri Perez is a 80 y.o. female with history of dementia  diabetes type 2 with hyperglycemia, gastroesophageal reflux disease, hyperlipidemia, essential hypertension, history of CVA, CKD stage III (baseline creatinine 1.2-1.3), who presents from nursing home with reports that patient was found unresponsive in bed. It is unclear how long patient had been unresponsive, suspected to have been anywhere from 1 to 1.5 hours prior to the time he was found. Paramedics were called and patient was brought to the emergency room, noted to respond to painful stimuli with eyes open in the emergency room, moving all extremities. He did not have much improvement with Narcan. Paramedics also report some abnormal ST depression on telemetry prior to arrival in the emergency room.     Patient has multiple electrolyte abnormalities on presentation to the emergency room, notably creatinine of 5.0 (baseline 1.2-1.3), sodium 150, potassium 5.4, bicarb 17, anion gap 27, leukocytosis of 26.1, troponin 0 0.34, lactic acid 3.3, proBNP 5257. Chest x-ray did not reveal any consolidation; revealed increased paratracheal opacity for which recommendation was made to rule out malignancy.     All history is gathered from medical records     Interval hx     BUN and creatinine trended down   UOP improved   On pureed diet now   Sodium level better     I/O last 3 completed shifts: In: 2367 [P.O.:60; I.V.:2278; IV Piggyback:29]  Out: 1382 [Urine:1320]  No intake/output data recorded.         Past Medical History:   Diagnosis Date    Arthritis     Chronic kidney disease     Constipation     D[facility]     Depression     [facility]     GERD (gastroesophageal reflux disease)     Glaucoma     Hyperlipidemia     Hypertension     [facility] infarction) [facility]     Muscle weakness (generalized)     Unspecified cerebral artery occlusion with cerebral infarction     UTI (lower urinary tract infection)        Past Surgical History:   Procedure Laterality Date    APPENDECTOMY      BREAST SURGERY      CHOLECYSTECTOMY      COLONOSCOPY      DILATATION, ESOPHAGUS      ENDOSCOPY, COLON, DIAGNOSTIC      EYE SURGERY      HYSTERECTOMY      JOINT REPLACEMENT      SIGMOIDOSCOPY  3/18/15    TONSILLECTOMY      UPPER GASTROINTESTINAL ENDOSCOPY  3/18/15    UPPER GASTROINTESTINAL ENDOSCOPY N/A 7/8/2020    EGD BIOPSY performed by Josie Clark MD at [address] [place] [address]         Current Medications:    0.45 % sodium chloride infusion, Continuous  piperacillin-tazobactam (ZOSYN) 3.375 g in dextrose 5 % 50 mL IVPB extended infusion (mini-bag), Q12H  pantoprazole (PROTONIX) injection 40 mg, BID  aspirin EC tablet 81 mg, Daily  atorvastatin (LIPITOR) tablet 40 mg, Daily  bisacodyl (DULCOLAX) suppository 10 mg, Daily PRN  Vitamin D (CHOLECALCIFEROL) tablet 1,000 Units, Daily  [Held by provider] donepezil (ARICEPT) tablet 10 mg, Nightly  [Held by provider] DULoxetine (CYMBALTA) extended release capsule 30 mg, Daily  metoprolol tartrate (LOPRESSOR) tablet 25 mg, BID  calcium elemental (OSCAL) tablet 500 mg, Daily  vitamin B-12 (CYANOCOBALAMIN) tablet 50 mcg, Daily  albuterol sulfate  (90 Base) MCG/ACT inhaler 2 puff, Q4H PRN  sodium chloride flush 0.9 % injection 10 mL, 2 times per day  sodium chloride flush 0.9 % injection 10 mL, PRN  acetaminophen (TYLENOL) tablet 650 mg, Q6H PRN    Or  acetaminophen (TYLENOL) suppository 650 mg, Q6H PRN  glucose (GLUTOSE) 40 % oral gel 15 g, PRN  dextrose 50 % IV solution, PRN  glucagon (rDNA) injection 1 mg, PRN  dextrose 5 % solution, PRN  insulin lispro (1 Unit Dial) 0-12 Units, Q4H  0.9 % sodium chloride infusion, Continuous  sodium chloride flush 0.9 % injection 10 mL, 2 times per day  sodium chloride flush 0.9 % injection 10 mL, PRN  acetaminophen (TYLENOL) tablet 650 mg, Q6H PRN    Or  acetaminophen (TYLENOL) suppository 650 mg, Q6H PRN  polyethylene glycol (GLYCOLAX) packet 17 g, Daily PRN  promethazine (PHENERGAN) tablet 12.5 mg, Q6H PRN    Or  ondansetron (ZOFRAN) injection 4 mg, Q6H PRN  0.9 % sodium chloride bolus, Once    Followed by  0.9 % sodium chloride infusion, Continuous  insulin regular (HUMULIN R;NOVOLIN R) injection 8 Units, Once  lidocaine PF 1 % injection 5 mL, Once  sodium chloride flush 0.9 % injection 10 mL, 2 times per day  sodium chloride flush 0.9 % injection 10 mL, PRN  lidocaine PF 1 % injection 5 mL, Once  sodium chloride flush 0.9 % injection 10 mL, 2 times per day  sodium chloride flush 0.9 % injection 10 mL, PRN  aspirin chewable tablet 324 mg, Once      Physical exam:     Vitals:  BP (!) 129/59   Pulse 82   Temp 97.4 °F (36.3 °C) (Temporal)   Resp 16   Ht 5' 5\" (1.651 m)   Wt 168 lb 6.9 oz (76.4 kg)   SpO2 99%   BMI 28.03 kg/m²   Constitutional:  Spontaneous eye opening , responding to simple commands   Skin: no rash, turgor - oral mucosa dry   Heent:  eomi, mmm  Neck: no bruits or jvd noted  Cardiovascular:  S1, S2 without m/r/g  Respiratory: CTA B without w/r/r  Abdomen:  +bs, soft, nt, nd  Ext: no  lower extremity edema    Labs:  CBC:   Recent Labs     07/07/20  1421 07/08/20  0438 07/08/20  1900 07/09/20  0220   WBC 33.0* 32.2*  --   --    HGB 9.4* 8.4* 8.2* 7.8*    226  --   --      BMP:    Recent Labs     07/07/20 2052 07/08/20  0438 07/08/20  1220   * 147* 148*   K 4.3 3.9 3.6   * 115* 115*   CO2 15* 15* 16*   * 138* 123*   CREATININE 4.2* 4.2* 3.7*   GLUCOSE 137* 128* 126*     Ca/Mg/Phos:   Recent Labs     07/07/20  0059 07/07/20  0500  07/07/20 2052 07/08/20  0438 07/08/20  1220   CALCIUM 9.3 9.4   < > 9.2 9.3 9.1   MG 2.10 2.20  --   --  1.80  --    PHOS 4.2 4.1  --   --  4.4  --     < > = values in this interval not displayed. Hepatic:   No results for input(s): AST, ALT, ALB, BILITOT, ALKPHOS in the last 72 hours. Troponin:   Recent Labs     07/06/20  1056   TROPONINI 0.22*     BNP: No results for input(s): BNP in the last 72 hours. Lipids:   No results for input(s): CHOL, TRIG, HDL, LDLCALC, LABVLDL in the last 72 hours. ABGs: No results for input(s): PHART, PO2ART, IXJ1VTU in the last 72 hours. INR:   No results for input(s): INR in the last 72 hours. UA:  No results for input(s): Shady Bombard, GLUCOSEU, BILIRUBINUR, KETUA, SPECGRAV, BLOODU, PHUR, PROTEINU, UROBILINOGEN, NITRU, LEUKOCYTESUR, Trinna Antis in the last 72 hours. Urine Microscopic:   No results for input(s): LABCAST, BACTERIA, COMU, HYALCAST, WBCUA, RBCUA, EPIU in the last 72 hours. Urine Culture:   No results for input(s): LABURIN in the last 72 hours. Urine Chemistry:   Recent Labs     07/06/20  1250   LABCREA 53.7   NAUR 34                IMAGING:  CT CHEST ABDOMEN PELVIS WO CONTRAST   Final Result   Addendum 1 of 1   ADDENDUM:   Also in impression: Right adrenal nodule is slightly larger as compared to   prior exam, indeterminate by CT criteria. Further evaluation could    include   adrenal protocol CT or MR in the nonacute setting. Final      XR CHEST PORTABLE   Final Result   No acute findings         US RENAL COMPLETE   Final Result   Kidneys are bilaterally small. No gross hydronephrosis.       2.2 cm complex appearing cyst at the lower pole of the right kidney. XR CHEST PORTABLE   Final Result   1. No acute airspace consolidation. 2. Increased right paratracheal opacity, and enlargement of the right hilar   shadow, both possibly representing normal vascular anatomy. However,   lymphadenopathy or mass lesions in these locations are less likely diagnostic   considerations, and recommend further characterization with a dedicated chest   CT with contrast.             I/O last 3 completed shifts: In: 2367 [P.O.:60; I.V.:2278; IV Piggyback:29]  Out: 1320 [Urine:1320]      Assessment/Plan :      1. CYNTHIA. pre renal   Hold losartan, metformin   CYNTHIA improving slowly   AG is 17  UOP is improving   Recommend to dose adjust all medications  based on renal functions  Maintain SBP> 90 mmHg   Daily weights   AVOID NSAIDs  Avoid Nephrotoxins  Monitor Intake/Output      2. HTN. Controlled BP     3. High anion gap acidosis 2/2 CYNTHIA   AG  30----> 16 ---> 17   Hold metformin     4. AMS 2/2 to metabolic encephalopathy   Hold namenda , aricept , cymbalata till more awake and alert     5. Hypernatremia - 2/2 hypovolemia.  Improved   Continue iv fluids     Strict I/O    continue 1/2 NS               Thank you for allowing us to participate in care of Cuca Grace         Electronically signed by: Ulysses Koroma MD, 7/9/2020, 9:11 AM    Cc time 35 minutes     [facility] [address]  Office : [phone]  Fax :[phone]

## 2020-07-09 NOTE — PROGRESS NOTES
Occupational Therapy   Occupational Therapy Daily Treatment Note/Discharge Summary  Date: 2020   Patient Name: Fer Romeo  MRN: [id number]     : 1933    Date of Service: 2020    Discharge Recommendations: Fer Romeo scored a  on the -[facility] ADL Inpatient form. At this time, no further OT is recommended upon discharge due to patient near baseline level of function. Recommend patient returns to prior setting with prior services. OT Equipment Recommendations  Equipment Needed: No    Assessment   Assessment: Patient presents near baseline level of function based on report from staff at [facility]. No acute OT indicated at this time; as such, will discharge pt from OT caseload. Assistance / Modification: 2 person assist bed mobility/toileting  OT Education: OT Role;Plan of Care  Patient Education: will require continued education  Barriers to Learning: cognition/arousal  REQUIRES OT FOLLOW UP: No  Activity Tolerance  Activity Tolerance: Treatment limited secondary to decreased cognition  Safety Devices  Safety Devices in place: Yes  Type of devices: Bed alarm in place;Nurse notified; Left in bed; All fall risk precautions in place           Patient Diagnosis(es): The primary encounter diagnosis was CYNTHIA (acute kidney injury) ([address] Diagnoses of Uremia, Septicemia ([address] and Suspected COVID-19 virus infection were also pertinent to this visit. has a past medical history of Arthritis, Chronic kidney disease, Constipation, Dementia ([address] Depression, Diabetes mellitus ([address] GERD (gastroesophageal reflux disease), Glaucoma, Hyperlipidemia, Hypertension, MI (myocardial infarction) ([address] Muscle weakness (generalized), Unspecified cerebral artery occlusion with cerebral infarction, and UTI (lower urinary tract infection). has a past surgical history that includes Appendectomy; Cholecystectomy; Breast surgery;  Colonoscopy; Endoscopy, colon, diagnostic; eye surgery; joint replacement; Dilatation, esophagus; Hysterectomy; Tonsillectomy; Upper gastrointestinal endoscopy (3/18/15); Sigmoidoscopy (3/18/15); and Upper gastrointestinal endoscopy (N/A, 7/8/2020). Treatment Diagnosis: multiple performance deficits associated w/ renal failure and AMS      Restrictions  Restrictions/Precautions  Restrictions/Precautions: Contact Precautions, Fall Risk(c-diff; high fall risk)  Position Activity Restriction  Other position/activity restrictions: This is a pleasant 80 y.o. female with history of dementia (without behavioral disturbance), diabetes type 2 with hyperglycemia, gastroesophageal reflux disease, hyperlipidemia, essential hypertension, history of CVA, CKD stage III (baseline creatinine 1.2-1.3), who presents from nursing home with reports that patient was found unresponsive in bed. It is unclear how long patient had been unresponsive, suspected to have been anywhere from 1 to 1.5 hours prior to the time he was found. Paramedics were called and patient was brought to the emergency room, noted to respond to painful stimuli with eyes open in the emergency room, moving all extremities. He did not have much improvement with Narcan. Paramedics also report some abnormal ST depression on telemetry prior to arrival in the emergency room. Subjective   General  Chart Reviewed: Yes  Patient assessed for rehabilitation services?: Yes  Family / Caregiver Present: No  Diagnosis: renal failure; AMS  Subjective  Subjective: Patient supine in bed upon arrival, agreeable to evaluation. Limited vocalizations an responses to verbal prompts, cooperative with tx. Patient Currently in Pain: Denies(Simultaneous filing.  User may not have seen previous data.)  Pain Assessment  Pain Assessment: 0-10  Pain Level: 0  Hilario-Baker Pain Rating: No hurt  Vital Signs  Temp: 98 °F (36.7 °C)  Temp Source: Temporal  Pulse: 76  Heart Rate Source: Monitor  Resp: 16  BP: (!) 138/59  BP Location: Left upper arm  BP Upper/Lower: Upper  MAP (mmHg): 79  Patient Position: Semi fowlers  Level of Consciousness: Alert  MEWS Score: 1  Patient Currently in Pain: Denies(Simultaneous filing. User may not have seen previous data.)  Oxygen Therapy  SpO2: 99 %  Pulse Oximeter Device Mode: Intermittent  Pulse Oximeter Device Location: Finger  O2 Device: None (Room air)     Social/Functional History  Social/Functional History  Type of Home: Facility([facility])  ADL Assistance: Needs assistance(pt toilets in depends, pt is assisted with ADLs in bed)  Homemaking Assistance: Needs assistance  Homemaking Responsibilities: No  Ambulation Assistance: Needs assistance(pt is non-ambulatory)  Transfer Assistance: Needs assistance(conrad lift to [facility] several times per week)  Additional Comments: Pt is a poor historian. Information taken from staff at [facility]. Pt was feeding herself until a few weeks ago and now staff assists with feeding. Objective   Vision: (Unable to formally test vision secondary to level of arousal)  Hearing: Within functional limits(eyes consistently open to verbal stimulation.   Unable to formally test secondary to lack of verbal response)    Orientation  Overall Orientation Status: Impaired  Orientation Level: Oriented to place;Oriented to person;Disoriented to time;Disoriented to situation     Balance  Sitting Balance: Dependent/Total(seated EOB ~8-10 minutes)  Standing Balance: Unable to assess(comment)  ADL  Toileting: Dependent/Total(incontinent of stool once seated EOB - requires assist of two to manage pericare and linen change)  Tone RUE  RUE Tone: Hypotonic  Tone LUE  LUE Tone: (appears normotonic w/ exception of minor tone in hand and elbow)     Bed mobility  Rolling to Left: Dependent/Total;2 Person assistance  Rolling to Right: Dependent/Total;2 Person assistance  Supine to Sit: 2 Person assistance;Dependent/Total  Sit to Supine: Dependent/Total;2 Person assistance  Scootin Person assistance;Dependent/Total  Comment: The pt sat EOB for 10 with total assistance for sitting balance, heavy posterior lean. Pt was assisted with rolling several times after she was incontinent of stool. Assisted with pericare and linen change. Cognition  Overall Cognitive Status: Exceptions  Arousal/Alertness: Inconsistent responses to stimuli  Following Commands: Does not follow commands  Cognition Comment: limited verbalizations; pt opens eyes when name called.  inconsistent and minimal yes-nodding        Sensation  Overall Sensation Status: Impaired(unable to foramlly assess d/t cognition/arousal)            Plan   Plan  Times per week: pt baseline/dc 7/9    G-Code     OutComes Score                                                  AM-PAC Score        [facility] Inpatient Daily Activity Raw Score: 6 (07/09/20 1618)  AM-PAC Inpatient ADL T-Scale Score : 17.07 (07/09/20 1618)  ADL Inpatient CMS 0-100% Score: 100 (07/09/20 1618)  ADL Inpatient CMS G-Code Modifier : CN (07/09/20 1618)    Goals  Short term goals  Time Frame for Short term goals: discharge  Short term goal 1: Grooming w/ ModA - not met  Short term goal 2: EOB sitting w/ ModA for functional task - not met  Short term goal 3: EOB sitting for 5+mins during functional tasks - not met  Short term goal 4: Bed mobility w/ ModA for ADL needs/toileting - not met       Therapy Time   Individual Concurrent Group Co-treatment   Time In       1518   Time Out       1611   Minutes       53      Timed Code Treatment Minutes:  53 Minutes    Total Treatment Minutes:  53 minutes    Samson Kerr, [address] [facility] OTR/L [id number]    Samson Kerr, OT

## 2020-07-09 NOTE — PROGRESS NOTES
[facility] PROGRESS NOTE    7/9/2020 9:00 AM        Name: Abrahan Ahn . Admitted: 7/5/2020  Primary Care Provider: Valery Slaughter MD (Tel: [phone])                        Hospital course:   80 y. o. female with history of dementia (without behavioral disturbance), diabetes type 2 with hyperglycemia, gastroesophageal reflux disease,PUD, colon ploys,  hyperlipidemia, essential hypertension, history of CVA, CKD stage III (baseline creatinine 1.2-1.3), who presents from nursing home with reports that patient was found unresponsive in bed.  It is unclear how long patient had been unresponsive, suspected to have been anywhere from 1 to 1.5 hours prior to the time he was found.  Paramedics were called and patient was brought to the emergency room.  The patient on presentation was acute encephalopathy due to metabolic problems such as hypernatremia and acute kidney injury.  And also developed increased beta hydroxybutyrate and hyperglycemia and developed DKA and transferred to ICU on 7/6/2020, started insulin drip.  Anion gap closed.  Insulin drip stopped on 7/7/2020     Subjective: Patient responsive answer my question however very slow  No acute events overnight. Resting well. Pain control. Diet ok. Labs reviewed  Denies any chest pain sob.      Reviewed interval ancillary notes    Current Medications  0.45 % sodium chloride infusion, Continuous  piperacillin-tazobactam (ZOSYN) 3.375 g in dextrose 5 % 50 mL IVPB extended infusion (mini-bag), Q12H  pantoprazole (PROTONIX) injection 40 mg, BID  aspirin EC tablet 81 mg, Daily  atorvastatin (LIPITOR) tablet 40 mg, Daily  bisacodyl (DULCOLAX) suppository 10 mg, Daily PRN  Vitamin D (CHOLECALCIFEROL) tablet 1,000 Units, Daily  [Held by provider] donepezil (ARICEPT) tablet 10 mg, Nightly  [Held by provider] DULoxetine (CYMBALTA) extended release capsule 30 mg, Daily  metoprolol tartrate (LOPRESSOR) tablet 25 mg, BID  calcium elemental (OSCAL) tablet 500 mg, Daily  vitamin B-12 (CYANOCOBALAMIN) tablet 50 mcg, Daily  albuterol sulfate  (90 Base) MCG/ACT inhaler 2 puff, Q4H PRN  sodium chloride flush 0.9 % injection 10 mL, 2 times per day  sodium chloride flush 0.9 % injection 10 mL, PRN  acetaminophen (TYLENOL) tablet 650 mg, Q6H PRN    Or  acetaminophen (TYLENOL) suppository 650 mg, Q6H PRN  glucose (GLUTOSE) 40 % oral gel 15 g, PRN  dextrose 50 % IV solution, PRN  glucagon (rDNA) injection 1 mg, PRN  dextrose 5 % solution, PRN  insulin lispro (1 Unit Dial) 0-12 Units, Q4H  0.9 % sodium chloride infusion, Continuous  sodium chloride flush 0.9 % injection 10 mL, 2 times per day  sodium chloride flush 0.9 % injection 10 mL, PRN  acetaminophen (TYLENOL) tablet 650 mg, Q6H PRN    Or  acetaminophen (TYLENOL) suppository 650 mg, Q6H PRN  polyethylene glycol (GLYCOLAX) packet 17 g, Daily PRN  promethazine (PHENERGAN) tablet 12.5 mg, Q6H PRN    Or  ondansetron (ZOFRAN) injection 4 mg, Q6H PRN  0.9 % sodium chloride bolus, Once    Followed by  0.9 % sodium chloride infusion, Continuous  insulin regular (HUMULIN R;NOVOLIN R) injection 8 Units, Once  lidocaine PF 1 % injection 5 mL, Once  sodium chloride flush 0.9 % injection 10 mL, 2 times per day  sodium chloride flush 0.9 % injection 10 mL, PRN  lidocaine PF 1 % injection 5 mL, Once  sodium chloride flush 0.9 % injection 10 mL, 2 times per day  sodium chloride flush 0.9 % injection 10 mL, PRN  aspirin chewable tablet 324 mg, Once        Objective:  BP (!) 129/59   Pulse 82   Temp 97.4 °F (36.3 °C) (Temporal)   Resp 16   Ht 5' 5\" (1.651 m)   Wt 168 lb 6.9 oz (76.4 kg)   SpO2 99%   BMI 28.03 kg/m²     Intake/Output Summary (Last 24 hours) at 7/9/2020 0900  Last data filed at 7/9/2020 0504  Gross per 24 hour   Intake 2367 ml   Output 1320 ml   Net 1047 ml      Wt Readings from Last 3 Encounters:   07/09/20 168 lb 6.9 oz (76.4 kg)   07/13/18 170 lb 14.4 oz (77.5 kg)   02/28/18 163 lb (73.9 kg)       General appearance: -American elderly lady, more responsive today talking several sentences , following commands   Eyes: Sclera clear. Pupils equal.  ENT: Moist oral mucosa. Trachea midline, no adenopathy. Cardiovascular: Regular rhythm, normal S1, S2. No murmur. No edema in lower extremities  Respiratory: Not using accessory muscles. Good inspiratory effort. Clear to auscultation bilaterally, no wheeze or crackles. GI: Abdomen soft, no tenderness, not distended, normal bowel sounds, Garrett catheter intact  Musculoskeletal: No cyanosis in digits, neck supple  Neurology: CN 2-12 grossly intact. No speech or motor deficits  Psych: Normal affect. Alert and oriented to herself only  Skin: Warm, dry, normal turgor  Extremities no peripheral edema noted    Labs and Tests:  CBC:   Recent Labs     07/07/20  1421 07/08/20  0438 07/08/20  1900 07/09/20  0220 07/09/20  1030   WBC 33.0* 32.2*  --   --  17.5*   HGB 9.4* 8.4* 8.2* 7.8* 7.3*  7.2*   HCT 30.9* 27.4* 26.4* 24.2* 23.2*  23.0*   MCV 96.9 92.5  --   --  93.4    226  --   --  178     BMP:    Recent Labs     07/07/20  2052 07/08/20  0438 07/08/20  1220   * 147* 148*   K 4.3 3.9 3.6   * 115* 115*   CO2 15* 15* 16*   * 138* 123*   CREATININE 4.2* 4.2* 3.7*   GLUCOSE 137* 128* 126*         Problem List  Active Problems:    Acute kidney injury (HCC)    DKA, type 1, not at goal [facility])    Suspected COVID-19 virus infection    Severe sepsis ([address]    Metabolic encephalopathy    Hypernatremia    Nursing home resident    History of arterial ischemic stroke    Essential hypertension    Mixed hyperlipidemia    History of depression    Diarrhea  Resolved Problems:    * No resolved hospital problems. *       Assessment & Plan:      1. Acute metabolic encephalopathy.  Could be secondary to multiple electrolyte abnormalities, acute kidney injury, underlying infectious process.  I am able to make a conversation with her she is sleepy however she is answering questions. Paco Chu does have underlying dementia at baseline. 2.Acute kidney injury on CKD stage III.  Baseline creatinine 1.2-1.3; creatinine 4.2-->3.7 today, improving, nephrology service following.   3.E. coli UTI significant pyuria noted, significant leukocytosis noted, E. coli growing in the urine culture, we will stopc Zyvox and continue Zosyn day #4.  Continue gentle intravenous fluid. Significant leukocytosis noted, C. difficile toxin pending. 4.Sepsis as manifested by tachycardia, white cell count improving , E. coli UTI, blood cultures pending   5. Acute respiratory failure with hypoxia.  Suspect secondary to underlying pulmonary infectious process.  Currently 96% and room air, posterior right-sided crackles noted COVID-19 negative . 6.Elevated troponin of 0.34, abnormal EKG.  Could be secondary to underlying sepsis and acute kidney injury.  Downtrending, continue with aspirin, statin.  Echocardiogram showed grade 2 diastolic dysfunction     7. Hypernatremia.    Continue sodium chloride 0.45% 100 ml/h.  Stable   8. Diabetes type 2 currently blood sugar 125,  continue with sliding scale insulin, hold Lantus due to acute kidney injury  9. Anion gap metabolic acidosis with beta hydroxybutyrate increase considered DKA moved to ICU setting yesterday and start DKA protocol. 10. Lactic acidosis.  Likely secondary to underlying sepsis (as noted above). Resolved.   11. Abnormal chest x-ray with increased paratracheal opacity, rule out malignancy.  Consider CT- chest with contrast once renal function stable, especially if family willing to pursue further evaluation to rule out malignancy.   12. History of dementia without behavioral disturbance, continue donezepil  13. Black stools: Patient has history of remote peptic ulcer disease, multiple colonoscopies due to colon polyps, Gastroenterology service performed upper GI endoscopy on 7/8/2020 with findings of moderate gastritis, biopsy performed no NSAIDs continue IV PPI for 72 hours then switch to oral PPI for 8 weeks twice daily  14. Other comorbidities include gastroesophageal reflux disease, hyperlipidemia, essential hypertension, history of CVA.         Diet: DIET DYSPHAGIA PUREED; Mildly Thick (Nectar);  No Drinking Straw  Code:DNR-CC  DVT PPX lovenox       Jaren Donato MD   7/9/2020 9:00 AM

## 2020-07-09 NOTE — PROGRESS NOTES
Infectious Diseases   Progress Note      Admission Date: 7/5/2020  Hospital Day: Hospital Day: 5   Attending: Blas Rinaldi MD  Date of service: 7/9/2020     Chief complaint/ Reason for consult:     · Severe sepsis with high fever, metabolic encephalopathy, acute renal failure  · Complicated E. coli UTI  · Right renal cyst on ultrasound  · Hypernatremia  · History of stroke    Microbiology:        I have reviewed allavailable micro lab data and cultures    · Blood culture (2/2) - collected on 7/5/2020: Negative so far  · Nasal MRSA culture- collectedon 7/6/2020: Negative  · Urine culture  - collected on 7/5/2020: 50,000 CFU per mL of E. coli    Escherichia coli (1)     Antibiotic Interpretation JOHANNA Status    ampicillin Sensitive 4 mcg/mL     ceFAZolin Sensitive <=4 mcg/mL      NOTE: Cefazolin should only be used for uncomplicated UTI        for E.coli or Klebsiella pneumoniae. cefepime Sensitive <=0.12 mcg/mL     cefTRIAXone Sensitive <=0.25 mcg/mL     ciprofloxacin Sensitive <=0.25 mcg/mL     ertapenem Sensitive <=0.12 mcg/mL     gentamicin Sensitive <=1 mcg/mL     levofloxacin Sensitive <=0.12 mcg/mL     nitrofurantoin Sensitive <=16 mcg/mL     piperacillin-tazobactam Sensitive <=4 mcg/mL     trimethoprim-sulfamethoxazole Sensitive <=20 mcg/mL         Antibiotics and immunizations:       Current antibiotics: All antibiotics and their doses were reviewed by me    Recent Abx Admin                   piperacillin-tazobactam (ZOSYN) 3.375 g in dextrose 5 % 50 mL IVPB extended infusion (mini-bag) (g) 3.375 g New Bag 07/09/20 1108     3.375 g New Bag 07/08/20 2225                  Immunization History: All immunization history was reviewed by me today. There is no immunization history on file for this patient. Known drug allergies:      All allergies were reviewed and updated    No Known Allergies    Social history:     Social History:  All social andepidemiologic history was reviewed and updated by me today as needed. · Tobacco use:   reports that she has quit smoking. She has a 3.75 pack-year smoking history. She has never used smokeless tobacco.  · Alcohol use:   reports no history of alcohol use. · Currently lives in: [address]  ·  reports no history of drug use. Assessment:     The patient is a 80 y.o. old female who  has a past medical history of Arthritis, Chronic kidney disease, Constipation, Dementia ([address] Depression, Diabetes mellitus ([facility] GERD (gastroesophageal reflux disease), Glaucoma, Hyperlipidemia, Hypertension, MI (myocardial infarction) ([facility] Muscle weakness (generalized), Unspecified cerebral artery occlusion with cerebral infarction, and UTI (lower urinary tract infection). with following problems:    · Severe sepsis with high fever, metabolic encephalopathy, acute renal failure -fever has resolved, encephalopathy continues, serum creatinine 3.7  · Complicated E. coli UTI-today is day 5 of Zosyn  · Right renal cyst on ultrasound  · Hypernatremia-serum sodium remains at 148  · History of stroke  · Nursing home resident  · Essential hypertension-blood pressure stable  · Mixed hyperlipidemia  · History of depression  · Diarrhea      Discussion:      The patient is on IV Zosyn. Today is day day 5 of IV Zosyn. She is afebrile. White cell count was 32,200 yesterday     Serum creatinine is 3.7    Blood cultures from 7/7/2020 have remained negative. Gastric biopsies done yesterday by GI during EGD, surgical pathology pending. I had ordered CT scan of chest abdomen and pelvis yesterday without contrast to find for any occult causes for her persistent very high white cell count. She has small bilateral pleural effusions and atelectasis and some lung nodules.   No obvious abscesses or other findings to explain her significant leukocytosis    The as the patient has been on IV Zosyn for 5 days and the E. coli in the urine was susceptible, I do not think it can explain passive motion  Genitourinary: Garrett's catheter in place   Psych: could not assess   Lymphatic/Immunologic: No obvious bruising, no cervical lymphadenopathy    Lines: All vascular access sites are healthy with no local erythema, discharge or tenderness    Intake and output:    I/O last 3 completed shifts: In: 2367 [P.O.:60; I.V.:2278; IV Piggyback:29]  Out: 36 [Urine:1320]    Lab Data:   All available labs and old records have been reviewed by me. CBC:  Recent Labs     07/07/20  1421 07/08/20  0438 07/08/20  1900 07/09/20  0220 07/09/20  1030   WBC 33.0* 32.2*  --   --   --    RBC 3.19* 2.96*  --   --   --    HGB 9.4* 8.4* 8.2* 7.8* 7.2*   HCT 30.9* 27.4* 26.4* 24.2* 23.0*    226  --   --   --    MCV 96.9 92.5  --   --   --    MCH 29.3 28.3  --   --   --    MCHC 30.3* 30.6*  --   --   --    RDW 18.3* 17.1*  --   --   --         BMP:  Recent Labs     07/08/20  0438 07/08/20  1220 07/09/20  1030   * 148* 148*   K 3.9 3.6 3.2*   * 115* 118*   CO2 15* 16* 17*   * 123* 102*   CREATININE 4.2* 3.7* 3.7*   CALCIUM 9.3 9.1 8.5   GLUCOSE 128* 126* 110*        Hepatic Function Panel:   Lab Results   Component Value Date    ALKPHOS 122 07/06/2020    ALT 18 07/06/2020    AST 46 07/06/2020    PROT 7.5 07/06/2020    BILITOT 0.4 07/06/2020    LABALBU 3.0 07/06/2020       CPK:   Lab Results   Component Value Date    CKTOTAL 1,552 (H) 07/06/2020     ESR:   Lab Results   Component Value Date    SEDRATE 105 (H) 06/29/2017     CRP: No results found for: CRP        Imaging: All pertinent images and reports for the current visit were reviewed by me during this visit. CT CHEST ABDOMEN PELVIS WO CONTRAST   Final Result   Addendum 1 of 1   ADDENDUM:   Also in impression: Right adrenal nodule is slightly larger as compared to   prior exam, indeterminate by CT criteria. Further evaluation could    include   adrenal protocol CT or MR in the nonacute setting.          Final      XR CHEST PORTABLE   Final acetaminophen **OR** acetaminophen, polyethylene glycol, promethazine **OR** ondansetron, sodium chloride flush, sodium chloride flush      Problem list:       Patient Active Problem List   Diagnosis Code    Acute lower GI bleeding K92.2    Chronic blood loss anemia D50.0    Dementia (HCC) F03.90    Morbid obesity ([facility]) E66.01    Diabetes mellitus ([facility] E11.9    Closed displaced fracture of right patella S82.001A    Status post total right knee replacement Z96.651    Constipation K59.00    Acute kidney injury ([facility] N17.9    DKA, type 1, not at goal ([facility] E10.10    Suspected COVID-19 virus infection Z20.828    Severe sepsis ([facility]) A41.9, T09.64    Metabolic encephalopathy U07.65    Hypernatremia E87.0    Nursing home resident Z59.3    History of arterial ischemic stroke Z86.73    Essential hypertension I10    Mixed hyperlipidemia E78.2    History of depression Z86.59    Diarrhea R19.7       Please note that this chart was generated using Dragon dictation software. Although every effort was made to ensure the accuracy of this automated transcription, some errors in transcription may have occurred inadvertently. If you may need any clarification, please do not hesitate to contact me through EPIC or at the phone number provided below with my electronic signature. Any pictures or media included in this note were obtained after taking informed verbal consent from the patient and with their approval to include those in the patient's medical record.     Ananya Bennett MD, MPH  7/9/2020 , 1:02 PM   [facility] Infectious Disease   Office: [phone]  Fax: [phone]  Tuesday AM clinic:   [address] [place] [address] 120  Thursday AM [id number] [facility]

## 2020-07-09 NOTE — PROGRESS NOTES
Physical Therapy    Facility/Department: [facility] ICU  Daily Treatment Note    NAME: Lora Wagner  : 1933  MRN: [id number]    Date of Service: 2020    Discharge Recommendations: Lora Wagner scored a 6/24 on the AM-PAC short mobility form. At this time, no further PT is recommended upon discharge as the pt requires total assistance for mobility at baseline. Recommend patient returns to prior setting with prior services. PT Equipment Recommendations  Equipment Needed: No  Other: TBD at next level of care    Assessment   Body structures, Functions, Activity limitations: Decreased functional mobility ; Decreased cognition;Decreased posture;Decreased ADL status; Decreased strength;Decreased balance;Decreased ROM; Decreased endurance  Assessment: The pt has severe PROM limitations, decreased cognition and requires total assistance for mobility at baseline. She is not appropriate for skilled PT. DC from PT caseload. Treatment Diagnosis: Impaired mobility  Prognosis: Poor  PT Education: PT Role;Plan of Care;Goals  Patient Education: Patient will require reinforcement due to cognitive deficits  Barriers to Learning: Cognition/arousal  No Skilled PT: At baseline function  REQUIRES PT FOLLOW UP: No  Activity Tolerance  Activity Tolerance: Patient Tolerated treatment well;Patient limited by cognitive status  Activity Tolerance: vitals remained stable, pt appeared comfortable       Patient Diagnosis(es): The primary encounter diagnosis was CYNTHIA (acute kidney injury) ([address] Diagnoses of Uremia, Septicemia ([address] and Suspected COVID-19 virus infection were also pertinent to this visit.      has a past medical history of Arthritis, Chronic kidney disease, Constipation, Dementia ([address] Depression, Diabetes mellitus ([address] GERD (gastroesophageal reflux disease), Glaucoma, Hyperlipidemia, Hypertension, MI (myocardial infarction) ([address] Muscle weakness (generalized), Unspecified cerebral artery occlusion with cerebral infarction, and UTI (lower urinary tract infection). has a past surgical history that includes Appendectomy; Cholecystectomy; Breast surgery; Colonoscopy; Endoscopy, colon, diagnostic; eye surgery; joint replacement; Dilatation, esophagus; Hysterectomy; Tonsillectomy; Upper gastrointestinal endoscopy (3/18/15); Sigmoidoscopy (3/18/15); and Upper gastrointestinal endoscopy (N/A, 7/8/2020). Restrictions  Restrictions/Precautions  Restrictions/Precautions: Contact Precautions, Fall Risk(c-diff; high fall risk)  Position Activity Restriction  Other position/activity restrictions: This is a pleasant 80 y.o. female with history of dementia (without behavioral disturbance), diabetes type 2 with hyperglycemia, gastroesophageal reflux disease, hyperlipidemia, essential hypertension, history of CVA, CKD stage III (baseline creatinine 1.2-1.3), who presents from nursing home with reports that patient was found unresponsive in bed. It is unclear how long patient had been unresponsive, suspected to have been anywhere from 1 to 1.5 hours prior to the time he was found. Paramedics were called and patient was brought to the emergency room, noted to respond to painful stimuli with eyes open in the emergency room, moving all extremities. He did not have much improvement with Narcan. Paramedics also report some abnormal ST depression on telemetry prior to arrival in the emergency room. Subjective  General  Chart Reviewed: Yes  Family / Caregiver Present: No(family arrived at end of session)  Diagnosis: Acute Kidney Injury  Follows Commands: Impaired  General Comment  Comments: Patient supine in bed upon arrival on room air. Subjective  Subjective: Patient minimally verbal throughout session. (this is baseline per family)  Pain Screening  Patient Currently in Pain: Denies(Simultaneous filing.  User may not have seen previous data.)          Orientation  Orientation  Overall Orientation Status: Impaired  Orientation Level: Oriented to place;Oriented to person;Disoriented to situation;Disoriented to time  Social/Functional History  Social/Functional History  Type of Home: Facility([facility])  ADL Assistance: Needs assistance(pt toilets in depends, pt is assisted with ADLs in bed)  Homemaking Assistance: Needs assistance  Homemaking Responsibilities: No  Ambulation Assistance: Needs assistance(pt is non-ambulatory)  Transfer Assistance: Needs assistance(conrad lift to [facility] several times per week)  Additional Comments: Pt is a poor historian. Information taken from staff at [facility]. Pt was feeding herself until a few weeks ago and now staff assists with feeding. Objective          PROM RLE (degrees)  RLE PROM: Exceptions  RLE General PROM: (R) ankle lacking ~ 20* DF from neutral position, unable to flex knees or hips beyond 30 degrees   PROM LLE (degrees)  LLE PROM: Exceptions  LLE General PROM: (L) ankle lacking ~ 30* DF from neutral position, unable to flex knees or hips beyond 30 degrees            Bed mobility  Rolling to Left: Dependent/Total;2 Person assistance  Rolling to Right: Dependent/Total;2 Person assistance  Supine to Sit: 2 Person assistance;Dependent/Total  Sit to Supine: Dependent/Total;2 Person assistance  Scootin Person assistance;Dependent/Total  Comment: The pt sat EOB for 10 with total assistance for sitting balance, heavy posterior lean. Pt was assisted with rolling several times after she was incontinent of stool. Assisted with pericare and linen change. Balance  Posture: Poor  Sitting - Static: Poor  Sitting - Dynamic: Poor        Plan   Plan  Times per week: 3-5x/week  Current Treatment Recommendations: ADL/Self-care Training, ROM, Balance Training, Functional Mobility Training, Transfer Training, Strengthening, Positioning  Safety Devices  Type of devices:  All fall risk precautions in place, Left in bed, Call light within reach, Bed alarm in place, Nurse notified      AM-PAC Score  AM-PAC Inpatient Mobility Raw Score : 6 (07/09/20 1615)  AM-PAC Inpatient T-Scale Score : 23.55 (07/09/20 1615)  Mobility Inpatient CMS 0-100% Score: 100 (07/09/20 1615)  Mobility Inpatient CMS G-Code Modifier : CN (07/09/20 1615)          Goals -Dc goals    Therapy Time   Individual Concurrent Group Co-treatment   Time In       1518   Time Out       1611   Minutes       53   Timed Code Treatment Minutes: [address] [facility] DPT [id number]

## 2020-07-09 NOTE — PROGRESS NOTES
Pt assessment completed and documented- see doc flowsheet. Pt alert on room air. VSS, medications given per MAR. Updated on POC. White board updated. Denies further needs at this time. Will continue to monitor.

## 2020-07-09 NOTE — PROGRESS NOTES
Shift assessment complete, see flowsheet. VSS, pt resting in bed. Whiteboard updated, bed in lowest position, wheels locked, alarm on. Pt repositioned for comfort. Will continue to monitor.

## 2020-07-09 NOTE — PROGRESS NOTES
anemia - hgb now down to 7. 2. pt had green stool today. Sepsis - E.coli UTI. CYNTHIA on CKD    PLAN    - PPI  - f/u path  - serial h/h. Transfusions PRN. - discussed with sister, Wilbur Dubose, regarding options of conservative management with PRBC transfusions as needed vs aggressive care including NG tube placement, bowel prep and colonoscopy for anemia. She will consider and let us know today. AddendumBernice Party called back and decided on conservative management at this time with PRBC if needed and deferring colonoscopy. Will sign off. Please call if questions.    Discussed with Dr. Erica Oswald, [address]

## 2020-07-10 LAB
ALBUMIN SERPL-MCNC: 2.2 G/DL (ref 3.1–4.9)
ALPHA-1-GLOBULIN: 0.3 G/DL (ref 0.2–0.4)
ALPHA-2-GLOBULIN: 1 G/DL (ref 0.4–1.1)
ANION GAP SERPL CALCULATED.3IONS-SCNC: 12 MMOL/L (ref 3–16)
ANION GAP SERPL CALCULATED.3IONS-SCNC: 13 MMOL/L (ref 3–16)
ANION GAP SERPL CALCULATED.3IONS-SCNC: 13 MMOL/L (ref 3–16)
ANION GAP SERPL CALCULATED.3IONS-SCNC: 16 MMOL/L (ref 3–16)
ANION GAP SERPL CALCULATED.3IONS-SCNC: 16 MMOL/L (ref 3–16)
BASOPHILS ABSOLUTE: 0 K/UL (ref 0–0.2)
BASOPHILS ABSOLUTE: 0.1 K/UL (ref 0–0.2)
BASOPHILS RELATIVE PERCENT: 0.1 %
BASOPHILS RELATIVE PERCENT: 0.4 %
BETA GLOBULIN: 0.7 G/DL (ref 0.9–1.6)
BUN BLDV-MCNC: 72 MG/DL (ref 7–20)
BUN BLDV-MCNC: 74 MG/DL (ref 7–20)
BUN BLDV-MCNC: 81 MG/DL (ref 7–20)
BUN BLDV-MCNC: 85 MG/DL (ref 7–20)
BUN BLDV-MCNC: 85 MG/DL (ref 7–20)
CALCIUM SERPL-MCNC: 8.1 MG/DL (ref 8.3–10.6)
CALCIUM SERPL-MCNC: 8.4 MG/DL (ref 8.3–10.6)
CHLORIDE BLD-SCNC: 116 MMOL/L (ref 99–110)
CHLORIDE BLD-SCNC: 117 MMOL/L (ref 99–110)
CHLORIDE BLD-SCNC: 119 MMOL/L (ref 99–110)
CHLORIDE BLD-SCNC: 120 MMOL/L (ref 99–110)
CHLORIDE BLD-SCNC: 121 MMOL/L (ref 99–110)
CO2: 15 MMOL/L (ref 21–32)
CO2: 15 MMOL/L (ref 21–32)
CO2: 16 MMOL/L (ref 21–32)
CO2: 17 MMOL/L (ref 21–32)
CO2: 17 MMOL/L (ref 21–32)
CREAT SERPL-MCNC: 2.7 MG/DL (ref 0.6–1.2)
CREAT SERPL-MCNC: 2.8 MG/DL (ref 0.6–1.2)
CREAT SERPL-MCNC: 3.1 MG/DL (ref 0.6–1.2)
EOSINOPHILS ABSOLUTE: 0.2 K/UL (ref 0–0.6)
EOSINOPHILS ABSOLUTE: 0.2 K/UL (ref 0–0.6)
EOSINOPHILS RELATIVE PERCENT: 1.5 %
EOSINOPHILS RELATIVE PERCENT: 1.8 %
FERRITIN: 549.9 NG/ML (ref 15–150)
FOLATE: 8.66 NG/ML (ref 4.78–24.2)
GAMMA GLOBULIN: 1.3 G/DL (ref 0.6–1.8)
GFR AFRICAN AMERICAN: 17
GFR AFRICAN AMERICAN: 19
GFR AFRICAN AMERICAN: 20
GFR NON-AFRICAN AMERICAN: 14
GFR NON-AFRICAN AMERICAN: 16
GFR NON-AFRICAN AMERICAN: 17
GLUCOSE BLD-MCNC: 100 MG/DL (ref 70–99)
GLUCOSE BLD-MCNC: 100 MG/DL (ref 70–99)
GLUCOSE BLD-MCNC: 103 MG/DL (ref 70–99)
GLUCOSE BLD-MCNC: 111 MG/DL (ref 70–99)
GLUCOSE BLD-MCNC: 135 MG/DL (ref 70–99)
GLUCOSE BLD-MCNC: 199 MG/DL (ref 70–99)
GLUCOSE BLD-MCNC: 212 MG/DL (ref 70–99)
GLUCOSE BLD-MCNC: 213 MG/DL (ref 70–99)
GLUCOSE BLD-MCNC: 216 MG/DL (ref 70–99)
GLUCOSE BLD-MCNC: 234 MG/DL (ref 70–99)
GLUCOSE BLD-MCNC: 92 MG/DL (ref 70–99)
HAPTOGLOBIN: 373 MG/DL (ref 30–200)
HCT VFR BLD CALC: 23.8 % (ref 36–48)
HCT VFR BLD CALC: 23.8 % (ref 36–48)
HCT VFR BLD CALC: 23.9 % (ref 36–48)
HCT VFR BLD CALC: 24.6 % (ref 36–48)
HEMATOLOGY PATH CONSULT: NORMAL
HEMOGLOBIN: 7.4 G/DL (ref 12–16)
HEMOGLOBIN: 7.4 G/DL (ref 12–16)
HEMOGLOBIN: 7.6 G/DL (ref 12–16)
HEMOGLOBIN: 7.6 G/DL (ref 12–16)
IRON SATURATION: 43 % (ref 15–50)
IRON: 46 UG/DL (ref 37–145)
LYMPHOCYTES ABSOLUTE: 0.6 K/UL (ref 1–5.1)
LYMPHOCYTES ABSOLUTE: 1 K/UL (ref 1–5.1)
LYMPHOCYTES RELATIVE PERCENT: 4.5 %
LYMPHOCYTES RELATIVE PERCENT: 6.8 %
MAGNESIUM: 2 MG/DL (ref 1.8–2.4)
MCH RBC QN AUTO: 28.7 PG (ref 26–34)
MCH RBC QN AUTO: 29.4 PG (ref 26–34)
MCHC RBC AUTO-ENTMCNC: 31.3 G/DL (ref 31–36)
MCHC RBC AUTO-ENTMCNC: 31.9 G/DL (ref 31–36)
MCV RBC AUTO: 91.8 FL (ref 80–100)
MCV RBC AUTO: 92.2 FL (ref 80–100)
MONOCYTES ABSOLUTE: 0.4 K/UL (ref 0–1.3)
MONOCYTES ABSOLUTE: 0.4 K/UL (ref 0–1.3)
MONOCYTES RELATIVE PERCENT: 3 %
MONOCYTES RELATIVE PERCENT: 3.1 %
MRSA CULTURE ONLY: NORMAL
NEUTROPHILS ABSOLUTE: 12.2 K/UL (ref 1.7–7.7)
NEUTROPHILS ABSOLUTE: 13.3 K/UL (ref 1.7–7.7)
NEUTROPHILS RELATIVE PERCENT: 88.3 %
NEUTROPHILS RELATIVE PERCENT: 90.5 %
PDW BLD-RTO: 17.3 % (ref 12.4–15.4)
PDW BLD-RTO: 17.4 % (ref 12.4–15.4)
PERFORMED ON: ABNORMAL
PERFORMED ON: NORMAL
PHOSPHORUS: 3.5 MG/DL (ref 2.5–4.9)
PLATELET # BLD: 186 K/UL (ref 135–450)
PLATELET # BLD: 207 K/UL (ref 135–450)
PMV BLD AUTO: 8.9 FL (ref 5–10.5)
PMV BLD AUTO: 9 FL (ref 5–10.5)
POTASSIUM SERPL-SCNC: 3 MMOL/L (ref 3.5–5.1)
POTASSIUM SERPL-SCNC: 3.2 MMOL/L (ref 3.5–5.1)
POTASSIUM SERPL-SCNC: 3.2 MMOL/L (ref 3.5–5.1)
POTASSIUM SERPL-SCNC: 3.3 MMOL/L (ref 3.5–5.1)
POTASSIUM SERPL-SCNC: 3.3 MMOL/L (ref 3.5–5.1)
RBC # BLD: 2.59 M/UL (ref 4–5.2)
RBC # BLD: 2.59 M/UL (ref 4–5.2)
SODIUM BLD-SCNC: 145 MMOL/L (ref 136–145)
SODIUM BLD-SCNC: 147 MMOL/L (ref 136–145)
SODIUM BLD-SCNC: 148 MMOL/L (ref 136–145)
SODIUM BLD-SCNC: 151 MMOL/L (ref 136–145)
SODIUM BLD-SCNC: 152 MMOL/L (ref 136–145)
SPE/IFE INTERPRETATION: NORMAL
TOTAL IRON BINDING CAPACITY: 107 UG/DL (ref 260–445)
TOTAL PROTEIN: 5.5 G/DL (ref 6.4–8.2)
VITAMIN B-12: 1463 PG/ML (ref 211–911)
WBC # BLD: 13.4 K/UL (ref 4–11)
WBC # BLD: 15.1 K/UL (ref 4–11)

## 2020-07-10 PROCEDURE — 6370000000 HC RX 637 (ALT 250 FOR IP): Performed by: INTERNAL MEDICINE

## 2020-07-10 PROCEDURE — 2580000003 HC RX 258: Performed by: INTERNAL MEDICINE

## 2020-07-10 PROCEDURE — 80048 BASIC METABOLIC PNL TOTAL CA: CPT

## 2020-07-10 PROCEDURE — 97129 THER IVNTJ 1ST 15 MIN: CPT

## 2020-07-10 PROCEDURE — 6370000000 HC RX 637 (ALT 250 FOR IP): Performed by: HOSPITALIST

## 2020-07-10 PROCEDURE — 1200000000 HC SEMI PRIVATE

## 2020-07-10 PROCEDURE — 6360000002 HC RX W HCPCS: Performed by: PHYSICIAN ASSISTANT

## 2020-07-10 PROCEDURE — 85014 HEMATOCRIT: CPT

## 2020-07-10 PROCEDURE — C9113 INJ PANTOPRAZOLE SODIUM, VIA: HCPCS | Performed by: PHYSICIAN ASSISTANT

## 2020-07-10 PROCEDURE — 2580000003 HC RX 258: Performed by: HOSPITALIST

## 2020-07-10 PROCEDURE — 83735 ASSAY OF MAGNESIUM: CPT

## 2020-07-10 PROCEDURE — 84100 ASSAY OF PHOSPHORUS: CPT

## 2020-07-10 PROCEDURE — 94760 N-INVAS EAR/PLS OXIMETRY 1: CPT

## 2020-07-10 PROCEDURE — 92526 ORAL FUNCTION THERAPY: CPT

## 2020-07-10 PROCEDURE — 99233 SBSQ HOSP IP/OBS HIGH 50: CPT | Performed by: INTERNAL MEDICINE

## 2020-07-10 PROCEDURE — 85018 HEMOGLOBIN: CPT

## 2020-07-10 PROCEDURE — 85025 COMPLETE CBC W/AUTO DIFF WBC: CPT

## 2020-07-10 RX ORDER — CIPROFLOXACIN 500 MG/1
500 TABLET, FILM COATED ORAL
Status: DISCONTINUED | OUTPATIENT
Start: 2020-07-10 | End: 2020-07-15 | Stop reason: HOSPADM

## 2020-07-10 RX ORDER — LACTOBACILLUS RHAMNOSUS GG 10B CELL
1 CAPSULE ORAL 2 TIMES DAILY WITH MEALS
Status: DISCONTINUED | OUTPATIENT
Start: 2020-07-10 | End: 2020-07-15 | Stop reason: HOSPADM

## 2020-07-10 RX ADMIN — CALCIUM 500 MG: 500 TABLET ORAL at 08:47

## 2020-07-10 RX ADMIN — POTASSIUM BICARBONATE 40 MEQ: 782 TABLET, EFFERVESCENT ORAL at 16:13

## 2020-07-10 RX ADMIN — METOPROLOL TARTRATE 25 MG: 25 TABLET, FILM COATED ORAL at 08:47

## 2020-07-10 RX ADMIN — Medication 1 CAPSULE: at 17:51

## 2020-07-10 RX ADMIN — CIPROFLOXACIN HYDROCHLORIDE 500 MG: 500 TABLET, FILM COATED ORAL at 16:12

## 2020-07-10 RX ADMIN — Medication 10 ML: at 20:13

## 2020-07-10 RX ADMIN — Medication 10 ML: at 20:14

## 2020-07-10 RX ADMIN — INSULIN LISPRO 4 UNITS: 100 INJECTION, SOLUTION INTRAVENOUS; SUBCUTANEOUS at 17:51

## 2020-07-10 RX ADMIN — DEXTROSE MONOHYDRATE: 50 INJECTION, SOLUTION INTRAVENOUS at 10:23

## 2020-07-10 RX ADMIN — SODIUM CHLORIDE 1000 ML: 9 INJECTION, SOLUTION INTRAVENOUS at 05:25

## 2020-07-10 RX ADMIN — METOPROLOL TARTRATE 25 MG: 25 TABLET, FILM COATED ORAL at 20:13

## 2020-07-10 RX ADMIN — INSULIN LISPRO 2 UNITS: 100 INJECTION, SOLUTION INTRAVENOUS; SUBCUTANEOUS at 13:06

## 2020-07-10 RX ADMIN — Medication 10 ML: at 08:48

## 2020-07-10 RX ADMIN — DESMOPRESSIN ACETATE 40 MG: 0.2 TABLET ORAL at 08:47

## 2020-07-10 RX ADMIN — VITAMIN D, TAB 1000IU (100/BT) 1000 UNITS: 25 TAB at 08:48

## 2020-07-10 RX ADMIN — PANTOPRAZOLE SODIUM 40 MG: 40 INJECTION, POWDER, FOR SOLUTION INTRAVENOUS at 20:13

## 2020-07-10 RX ADMIN — INSULIN LISPRO 4 UNITS: 100 INJECTION, SOLUTION INTRAVENOUS; SUBCUTANEOUS at 20:16

## 2020-07-10 RX ADMIN — PANTOPRAZOLE SODIUM 40 MG: 40 INJECTION, POWDER, FOR SOLUTION INTRAVENOUS at 08:48

## 2020-07-10 RX ADMIN — Medication 50 MCG: at 08:54

## 2020-07-10 RX ADMIN — ASPIRIN 81 MG: 81 TABLET, COATED ORAL at 08:48

## 2020-07-10 ASSESSMENT — PAIN SCALES - WONG BAKER
WONGBAKER_NUMERICALRESPONSE: 0

## 2020-07-10 ASSESSMENT — ENCOUNTER SYMPTOMS
COLOR CHANGE: 0
TROUBLE SWALLOWING: 0
DIARRHEA: 0
SHORTNESS OF BREATH: 0
CHEST TIGHTNESS: 0
ABDOMINAL PAIN: 0
NAUSEA: 0
PHOTOPHOBIA: 0
STRIDOR: 0
EYE DISCHARGE: 0
FACIAL SWELLING: 0
APNEA: 0
COUGH: 0
BLOOD IN STOOL: 0
CHOKING: 0
EYE REDNESS: 0
RHINORRHEA: 0

## 2020-07-10 ASSESSMENT — PAIN SCALES - GENERAL
PAINLEVEL_OUTOF10: 0

## 2020-07-10 NOTE — PLAN OF CARE
Problem: Falls - Risk of:  Goal: Will remain free from falls  Description: Will remain free from falls  Outcome: Ongoing  Goal: Absence of physical injury  Description: Absence of physical injury  Outcome: Ongoing     Problem: Skin Integrity:  Goal: Will show no infection signs and symptoms  Description: Will show no infection signs and symptoms  Outcome: Ongoing  Goal: Absence of new skin breakdown  Description: Absence of new skin breakdown  Outcome: Ongoing     Problem: Confusion - Acute:  Goal: Absence of continued neurological deterioration signs and symptoms  Description: Absence of continued neurological deterioration signs and symptoms  Outcome: Ongoing  Goal: Mental status will be restored to baseline  Description: Mental status will be restored to baseline  Outcome: Ongoing     Problem: Discharge Planning:  Goal: Ability to perform activities of daily living will improve  Description: Ability to perform activities of daily living will improve  Outcome: Ongoing  Goal: Participates in care planning  Description: Participates in care planning  Outcome: Ongoing  Goal: Discharged to appropriate level of care  Description: Discharged to appropriate level of care  Outcome: Ongoing     Problem: Injury - Risk of, Physical Injury:  Goal: Will remain free from falls  Description: Will remain free from falls  Outcome: Ongoing  Goal: Absence of physical injury  Description: Absence of physical injury  Outcome: Ongoing     Problem: Mood - Altered:  Goal: Mood stable  Description: Mood stable  Outcome: Ongoing  Goal: Absence of abusive behavior  Description: Absence of abusive behavior  Outcome: Ongoing  Goal: Verbalizations of feeling emotionally comfortable while being cared for will increase  Description: Verbalizations of feeling emotionally comfortable while being cared for will increase  Outcome: Ongoing     Problem: Psychomotor Activity - Altered:  Goal: Absence of psychomotor disturbance signs and

## 2020-07-10 NOTE — PROGRESS NOTES
Office : [phone]     Fax :[phone]       Nephrology  Note          Chief Complaint:    Chief Complaint   Patient presents with    Altered Mental Status     pt brought by [facility] from [facility] with being found unresponsive by nurse; last seen normal approximately 1.5 hours ago; nurse came in and found patient like this; 35 mcg Fentanyl patch on patient and removed by EMS; given Narcan 2 mg IV by EMS; per EMS, pt has ST-elevation in v2, v3, and aVf         History of Present Ilness:    Anderson Toro is a 80 y.o. female with history of dementia  diabetes type 2 with hyperglycemia, gastroesophageal reflux disease, hyperlipidemia, essential hypertension, history of CVA, CKD stage III (baseline creatinine 1.2-1.3), who presents from nursing home with reports that patient was found unresponsive in bed. It is unclear how long patient had been unresponsive, suspected to have been anywhere from 1 to 1.5 hours prior to the time he was found. Paramedics were called and patient was brought to the emergency room, noted to respond to painful stimuli with eyes open in the emergency room, moving all extremities. He did not have much improvement with Narcan. Paramedics also report some abnormal ST depression on telemetry prior to arrival in the emergency room.     Patient has multiple electrolyte abnormalities on presentation to the emergency room, notably creatinine of 5.0 (baseline 1.2-1.3), sodium 150, potassium 5.4, bicarb 17, anion gap 27, leukocytosis of 26.1, troponin 0 0.34, lactic acid 3.3, proBNP 5257. Chest x-ray did not reveal any consolidation; revealed increased paratracheal opacity for which recommendation was made to rule out malignancy.     All history is gathered from medical records     Interval hx     BUN and creatinine trended down   UOP improved   On pureed diet now   Sodium level better     I/O last 3 completed shifts:   In: 2242 [I.V.:2242]  Out: 26 [Urine:745]  I/O this shift:  In: 61 [P.O.:50; I.V.:10]  Out: -         Past Medical History:   Diagnosis Date    Arthritis     Chronic kidney disease     Constipation     [facility]ementia ([facility]     Depression     Diabetes mellitus ([facility]     GERD (gastroesophageal reflux disease)     Glaucoma     Hyperlipidemia     Hypertension     [facility]     Muscle weakness (generalized)     Unspecified cerebral artery occlusion with cerebral infarction     UTI (lower urinary tract infection)        Past Surgical History:   Procedure Laterality Date    APPENDECTOMY      BREAST SURGERY      CHOLECYSTECTOMY      COLONOSCOPY      DILATATION, ESOPHAGUS      ENDOSCOPY, COLON, DIAGNOSTIC      EYE SURGERY      HYSTERECTOMY      JOINT REPLACEMENT      SIGMOIDOSCOPY  3/18/15    TONSILLECTOMY      UPPER GASTROINTESTINAL ENDOSCOPY  3/18/15    UPPER GASTROINTESTINAL ENDOSCOPY N/A 7/8/2020    EGD BIOPSY performed by Devon Ordoñez MD at [address] [place] [address]         Current Medications:    dextrose 5 % 1,000 mL infusion, Continuous  pantoprazole (PROTONIX) injection 40 mg, BID  aspirin EC tablet 81 mg, Daily  atorvastatin (LIPITOR) tablet 40 mg, Daily  bisacodyl (DULCOLAX) suppository 10 mg, Daily PRN  Vitamin D (CHOLECALCIFEROL) tablet 1,000 Units, Daily  [Held by provider] donepezil (ARICEPT) tablet 10 mg, Nightly  [Held by provider] DULoxetine (CYMBALTA) extended release capsule 30 mg, Daily  metoprolol tartrate (LOPRESSOR) tablet 25 mg, BID  calcium elemental (OSCAL) tablet 500 mg, Daily  vitamin B-12 (CYANOCOBALAMIN) tablet 50 mcg, Daily  albuterol sulfate  (90 Base) MCG/ACT inhaler 2 puff, Q4H PRN  acetaminophen (TYLENOL) tablet 650 mg, Q6H PRN    Or  acetaminophen (TYLENOL) suppository 650 mg, Q6H PRN  glucose (GLUTOSE) 40 % oral gel 15 g, PRN  dextrose 50 % IV solution, PRN  glucagon (rDNA) injection 1 mg, PRN  dextrose 5 % solution, PRN  insulin lispro (1 Unit Dial) 0-12 Units, Q4H  sodium chloride flush 0.9 % injection 10 mL, 2 times per day  sodium chloride flush 0.9 % injection 10 mL, PRN  acetaminophen (TYLENOL) tablet 650 mg, Q6H PRN    Or  acetaminophen (TYLENOL) suppository 650 mg, Q6H PRN  polyethylene glycol (GLYCOLAX) packet 17 g, Daily PRN  promethazine (PHENERGAN) tablet 12.5 mg, Q6H PRN    Or  ondansetron (ZOFRAN) injection 4 mg, Q6H PRN  0.9 % sodium chloride bolus, Once    Followed by  0.9 % sodium chloride infusion, Continuous  insulin regular (HUMULIN R;NOVOLIN R) injection 8 Units, Once  lidocaine PF 1 % injection 5 mL, Once  lidocaine PF 1 % injection 5 mL, Once  sodium chloride flush 0.9 % injection 10 mL, 2 times per day  sodium chloride flush 0.9 % injection 10 mL, PRN  aspirin chewable tablet 324 mg, Once      Physical exam:     Vitals:  BP (!) 116/41   Pulse 93   Temp 97.8 °F (36.6 °C) (Temporal)   Resp 12   Ht 5' 5\" (1.651 m)   Wt 167 lb 12.3 oz (76.1 kg)   SpO2 100%   BMI 27.92 kg/m²   Constitutional:  Spontaneous eye opening , responding to simple commands   Skin: no rash, turgor - oral mucosa dry   Heent:  eomi, mmm  Neck: no bruits or jvd noted  Cardiovascular:  S1, S2 without m/r/g  Respiratory: CTA B without w/r/r  Abdomen:  +bs, soft, nt, nd  Ext: no  lower extremity edema    Labs:  CBC:   Recent Labs     07/09/20  1030 07/09/20  1630 07/10/20  0220 07/10/20  0525 07/10/20  1025   WBC 17.5* 17.5*  --  15.1*  --    HGB 7.3*  7.2* 7.8* 7.4* 7.6* 7.6*    196  --  186  --      BMP:    Recent Labs     07/09/20  2300 07/10/20  0525 07/10/20  1025   * 151*  152* 148*   K 3.1* 3.2*  3.2* 3.0*   * 120*  121* 119*   CO2 16* 15*  15* 16*   BUN 91* 85*  85* 81*   CREATININE 3.3* 3.1*  3.1* 3.1*   GLUCOSE 116* 100*  103* 135*     Ca/Mg/Phos:   Recent Labs 07/08/20  0438  07/09/20  1030  07/09/20  2300 07/10/20  0525 07/10/20  1025   CALCIUM 9.3   < > 8.5   < > 8.3 8.4  8.4 8.4   MG 1.80  --  1.60*  --   --  2.00  --    PHOS 4.4  --   --   --   --  3.5  --     < > = values in this interval not displayed. Hepatic:   Recent Labs     07/09/20  1630   BILITOT 0.3     Troponin:   No results for input(s): TROPONINI in the last 72 hours. BNP: No results for input(s): BNP in the last 72 hours. Lipids:   No results for input(s): CHOL, TRIG, HDL, LDLCALC, LABVLDL in the last 72 hours. ABGs: No results for input(s): PHART, PO2ART, JAW1VGI in the last 72 hours. INR:   No results for input(s): INR in the last 72 hours. UA:  No results for input(s): Brea Bash, GLUCOSEU, BILIRUBINUR, KETUA, SPECGRAV, BLOODU, PHUR, PROTEINU, UROBILINOGEN, NITRU, LEUKOCYTESUR, [place]etta [place] in the last 72 hours. Urine Microscopic:   No results for input(s): LABCAST, BACTERIA, COMU, HYALCAST, WBCUA, RBCUA, EPIU in the last 72 hours. Urine Culture:   Recent Labs     07/09/20  1325   LABURIN No growth to date  Further report to follow       Urine Chemistry:   No results for input(s): Jeannetta Longs, PROTEINUR, NAUR in the last 72 hours. IMAGING:  CT CHEST ABDOMEN PELVIS WO CONTRAST   Final Result   Addendum 1 of 1   ADDENDUM:   Also in impression: Right adrenal nodule is slightly larger as compared to   prior exam, indeterminate by CT criteria. Further evaluation could    include   adrenal protocol CT or MR in the nonacute setting. Final      XR CHEST PORTABLE   Final Result   No acute findings         US RENAL COMPLETE   Final Result   Kidneys are bilaterally small. No gross hydronephrosis. 2.2 cm complex appearing cyst at the lower pole of the right kidney. XR CHEST PORTABLE   Final Result   1. No acute airspace consolidation.    2. Increased right paratracheal opacity, and enlargement of the right hilar   shadow, both possibly representing normal vascular anatomy. However,   lymphadenopathy or mass lesions in these locations are less likely diagnostic   considerations, and recommend further characterization with a dedicated chest   CT with contrast.             I/O last 3 completed shifts: In: 2242 [I.V.:2242]  Out: 745 [Urine:745]      Assessment/Plan :      1. CYNTHIA. pre renal   Hold losartan, metformin   CYNTHIA improving slowly   AG is 13   UOP is improving   Recommend to dose adjust all medications  based on renal functions  Maintain SBP> 90 mmHg   Daily weights   AVOID NSAIDs  Avoid Nephrotoxins  Monitor Intake/Output      2. HTN. Controlled BP     3. High anion gap acidosis 2/2 CYNTHIA   AG  30----> 16 ---> 13   Hold metformin     4. AMS 2/2 to metabolic encephalopathy   Hold namenda , aricept , cymbalata till more awake and alert     5. Hypernatremia - 2/2 hypovolemia.   Start d5 w    Strict I/O    continue 1/2 NS               Thank you for allowing us to participate in care of Tony Arroyo         Electronically signed by: William Franco MD, 7/10/2020, 11:41 AM      [facility] [address]  Office : [phone]  Fax :[phone]

## 2020-07-10 NOTE — PROGRESS NOTES
Shift assessment completed, patient is alert, oriented to self only, calm and cooperative with care, total assist for feed, ate 25% of breakfast, medications administered per MAR. Fall precautions in place, hourly rounding, call light and belongings in reach, bed in lowest position, wheels locked in place, side rails up x 2, walkways free of clutter, bed alarm on.

## 2020-07-10 NOTE — PROGRESS NOTES
Infectious Diseases   Progress Note      Admission Date: 7/5/2020  Hospital Day: Hospital Day: 6   Attending: Margarito Guallpa MD  Date of service: 7/10/2020     Chief complaint/ Reason for consult:     · Severe sepsis with high fever, metabolic encephalopathy, acute renal failure  · Complicated E. coli UTI  · Right renal cyst on ultrasound  · Hypernatremia  · History of stroke    Microbiology:        I have reviewed allavailable micro lab data and cultures    · Blood culture (2/2) - collected on 7/5/2020: Negative so far  · Nasal MRSA culture- collectedon 7/6/2020: Negative  · Urine culture  - collected on 7/5/2020: 50,000 CFU per mL of E. coli    Escherichia coli (1)     Antibiotic Interpretation JOHANNA Status    ampicillin Sensitive 4 mcg/mL     ceFAZolin Sensitive <=4 mcg/mL      NOTE: Cefazolin should only be used for uncomplicated UTI        for E.coli or Klebsiella pneumoniae. cefepime Sensitive <=0.12 mcg/mL     cefTRIAXone Sensitive <=0.25 mcg/mL     ciprofloxacin Sensitive <=0.25 mcg/mL     ertapenem Sensitive <=0.12 mcg/mL     gentamicin Sensitive <=1 mcg/mL     levofloxacin Sensitive <=0.12 mcg/mL     nitrofurantoin Sensitive <=16 mcg/mL     piperacillin-tazobactam Sensitive <=4 mcg/mL     trimethoprim-sulfamethoxazole Sensitive <=20 mcg/mL         Antibiotics and immunizations:       Current antibiotics: All antibiotics and their doses were reviewed by me    Recent Abx Admin      No antibiotic orders with administrations found. Immunization History: All immunization history was reviewed by me today. There is no immunization history on file for this patient. Known drug allergies: All allergies were reviewed and updated    No Known Allergies    Social history:     Social History:  All social andepidemiologic history was reviewed and updated by me today as needed. · Tobacco use:   reports that she has quit smoking. She has a 3.75 pack-year smoking history.  She has never used smokeless tobacco.  · Alcohol use:   reports no history of alcohol use. · Currently lives in: [address]  ·  reports no history of drug use. Assessment:     The patient is a 80 y.o. old female who  has a past medical history of Arthritis, Chronic kidney disease, Constipation, Dementia ([address] Depression, Diabetes mellitus ([facility] [address] GERD (gastroesophageal reflux disease), Glaucoma, Hyperlipidemia, Hypertension, MI (myocardial infarction) ([facility] [address] Muscle weakness (generalized), Unspecified cerebral artery occlusion with cerebral infarction, and UTI (lower urinary tract infection). with following problems:    · Severe sepsis with high fever, metabolic encephalopathy, acute renal failure -resolved, white cell count is now improving it is 15,100, serum creatinine 3.1 today  · Complicated E. coli UTI-I had Zosyn yesterday, will put on Cipro oral today  · Right renal cyst on ultrasound  · Hypernatremia-serum sodium is still 148  · History of stroke  · Nursing home resident  · Essential hypertension-blood pressure okay  · Mixed hyperlipidemia  · History of depression  · Diarrhea-improving, C. difficile negative  · Overweight due to excess calorie intake : Body mass index is 27.92 kg/m². Discussion:      She is on IV Zosyn. Urine culture from 7/6/2020 had grown 50,000 CFU per mL of E. coli. White cell count is 15,100 today. Serum creatinine 3.1    Hemoglobin is 7.6    Her stomach biopsy surgical path did not show any H. pylori. Plan:     Diagnostic Workup:    · Continue to follow  fever curve, WBC count and blood cultures  · Follow up on liver and renal function    Antimicrobials:    · Her white cell count actually did improve to 15,100 today. The patient was seen by hematology.   Leukocytosis is thought to be reactive  · As a white cell count seems to be improving, I will put her on p.o. ciprofloxacin 500 mg at a renally adjusted dose of every 24 hour for 1 week to treat as a complicated UTI with E. coli  · Start oral probiotics twice daily  · Continue to watch for any worsening or diarrhea  · Cough and improving oxygenation aspiration precaution  · Fall precaution  · DVT prophylaxis  · Discussed all above with patient and RN      Drug Monitoring:    · Continue monitoring for antibiotic toxicity as follows: CMP, QTc interval  · Continue to watch for following: new or worsening fever, new hypotension, hives, lip swelling and redness or purulence at vascular access sites. I/v access Management:    · Continue to monitor i.v access sites for erythema, induration, discharge or tenderness. · As always, continue efforts to minimize tubes/lines/drains as clinically appropriate to reduce chances of line associated infections. Patient education and counseling:        · The patient was educated in detail about the side-effects of various antibiotics and things to watch for like new rashes, lip swelling, severe reaction, worsening diarrhea, break through fever etc.  · Discussed patient's condition and what to expect. All of the patient's questions were addressed in a satisfactory manner and patient verbalized understanding all instructions. Weight loss counseling:    Extensive weight loss counseling was done. It is important to set a realistic weight loss goal. First goal should be to avoid gaining more weight and staying at current weight (or within 5 percent). People at high risk of developing diabetes who are able to lose 5 percent of their body weight and maintain this weight will reduce their risk of developing diabetes by about 50 percent and reduce their blood pressure. Losing more than 15 percent of  body weight and staying at this weight is an extremely good result, even if you never reach your \"dream\" or \"ideal\" weight.     Lifestyle changes including changing eating habits, substituting excess carbohydrates with proteins, stress reduction, using self-help programs like Weight Watchers®, Overeaters Anonymous®, and Take Off Pounds Sensibly (TOPS)© , following DASH diet and increasing exercise or walking briskly daily for half hour to and hour 5-7 days a week was suggested among other measures. Information was given about various weight loss education programs and their websites like www.cdc.gov/healthyweight, www.choosemyplate.gov and www.health.gov/dietaryguidelines/    Fluoroquinolone related instructions:     Patient instructed to watch for low or high blood sugars, muscle pains and ankle tendon pain while on Ciprofloxacin or Levofloxacin. Patient was advised to keep a sugar candy at all times as all fluoroquinolones have the potential of causing hypoglycemia. If these symptoms develops, patient was instructed to stop the antibiotic and call my office at [phone]. Use sunscreen when going in bright sun while on Ciprofloxacin or Levofloxacin as these antibiotics can cause photosensitivity. Take 1 hour before or 2 hour after dairy, calcium, iron, magnesium, aluminum or zinc.      TIME SPENT TODAY:     - Spent over  36  minutes on visit (including interval history, physical exam, review of data including labs, cultures, imaging, development and implementation of treatment plan and coordination of complex care). Thanks for allowing me to participate in your patient's care. I will sign off today, but will be available to answer any further questions or concerns that may arise during patient's stay in the hospital.          Subjective: Interval history: Interval history was obtained from chart review and RN. The patient is afebrile. She is more awake today. Tolerating antibiotics okay     REVIEW OF SYSTEMS:      Review of Systems   Constitutional: Positive for fatigue. Negative for chills, diaphoresis and unexpected weight change. HENT: Negative for congestion, ear discharge, ear pain, facial swelling, hearing loss, rhinorrhea and trouble swallowing.     Eyes: Negative for photophobia, ENDOSCOPY       Family History: All family history was reviewed today. History reviewed. No pertinent family history. Objective:       PHYSICAL EXAM:      Vitals:   Vitals:    07/10/20 0600 07/10/20 0700 07/10/20 0830 07/10/20 1230   BP:   (!) 116/41 (!) 141/47   Pulse: 88  93 79   Resp: 14 11 12 14   Temp:   97.8 °F (36.6 °C) 97.3 °F (36.3 °C)   TempSrc:   Temporal Temporal   SpO2:   100% 100%   Weight:       Height:           Physical Exam  Vitals signs and nursing note reviewed. Constitutional:       General: She is not in acute distress. Appearance: She is well-developed. She is not diaphoretic. Comments: More awake today   HENT:      Head: Normocephalic. Right Ear: External ear normal.      Left Ear: External ear normal.      Nose: Nose normal.   Eyes:      General: No scleral icterus. Right eye: No discharge. Left eye: No discharge. Conjunctiva/sclera: Conjunctivae normal.      Pupils: Pupils are equal, round, and reactive to light. Neck:      Musculoskeletal: Normal range of motion and neck supple. Cardiovascular:      Rate and Rhythm: Normal rate and regular rhythm. Heart sounds: No murmur. No friction rub. Pulmonary:      Effort: Pulmonary effort is normal.      Breath sounds: No stridor. No wheezing or rales. Chest:      Chest wall: No tenderness. Abdominal:      Palpations: Abdomen is soft. There is no mass. Tenderness: There is no abdominal tenderness. There is no guarding or rebound. Musculoskeletal:         General: No tenderness. Lymphadenopathy:      Cervical: No cervical adenopathy. Skin:     General: Skin is warm and dry. Findings: No erythema or rash. Neurological:      Mental Status: She is oriented to person, place, and time. Motor: No abnormal muscle tone.    Psychiatric:         Judgment: Judgment normal.          Lines: All vascular access sites are healthy with no local erythema, discharge or tenderness    Intake and output:    I/O last 3 completed shifts: In: 2402 [P.O.:150; I.V.:2252]  Out: 745 [Urine:745]    Lab Data:   All available labs and old records have been reviewed by me. CBC:  Recent Labs     07/09/20  1030 07/09/20  1630 07/10/20  0220 07/10/20  0525 07/10/20  1025   WBC 17.5* 17.5*  --  15.1*  --    RBC 2.49* 2.64*  --  2.59*  --    HGB 7.3*  7.2* 7.8* 7.4* 7.6* 7.6*   HCT 23.2*  23.0* 24.6*  24.7* 23.8* 23.9* 24.6*    196  --  186  --    MCV 93.4 93.2  --  92.2  --    MCH 29.4 29.4  --  29.4  --    MCHC 31.5 31.5  --  31.9  --    RDW 17.5* 17.6*  --  17.3*  --         BMP:  Recent Labs     07/09/20  2300 07/10/20  0525 07/10/20  1025   * 151*  152* 148*   K 3.1* 3.2*  3.2* 3.0*   * 120*  121* 119*   CO2 16* 15*  15* 16*   BUN 91* 85*  85* 81*   CREATININE 3.3* 3.1*  3.1* 3.1*   CALCIUM 8.3 8.4  8.4 8.4   GLUCOSE 116* 100*  103* 135*        Hepatic Function Panel:   Lab Results   Component Value Date    ALKPHOS 122 07/06/2020    ALT 18 07/06/2020    AST 46 07/06/2020    PROT 5.5 07/09/2020    BILITOT 0.3 07/09/2020    BILIDIR <0.2 07/09/2020    IBILI see below 07/09/2020    LABALBU 2.2 07/09/2020       CPK:   Lab Results   Component Value Date    CKTOTAL 1,552 (H) 07/06/2020     ESR:   Lab Results   Component Value Date    SEDRATE 105 (H) 06/29/2017     CRP: No results found for: CRP        Imaging: All pertinent images and reports for the current visit were reviewed by me during this visit. CT CHEST ABDOMEN PELVIS WO CONTRAST   Final Result   Addendum 1 of 1   ADDENDUM:   Also in impression: Right adrenal nodule is slightly larger as compared to   prior exam, indeterminate by CT criteria. Further evaluation could    include   adrenal protocol CT or MR in the nonacute setting. Final      XR CHEST PORTABLE   Final Result   No acute findings         US RENAL COMPLETE   Final Result   Kidneys are bilaterally small. No gross hydronephrosis.       2.2 cm complex appearing cyst at the lower pole of the right kidney. XR CHEST PORTABLE   Final Result   1. No acute airspace consolidation. 2. Increased right paratracheal opacity, and enlargement of the right hilar   shadow, both possibly representing normal vascular anatomy. However,   lymphadenopathy or mass lesions in these locations are less likely diagnostic   considerations, and recommend further characterization with a dedicated chest   CT with contrast.             Medications: All current and past medications were reviewed.      potassium bicarb-citric acid  40 mEq Oral Once    ciprofloxacin  500 mg Oral Daily    lactobacillus  1 capsule Oral BID WC    pantoprazole  40 mg Intravenous BID    aspirin  81 mg Oral Daily    atorvastatin  40 mg Oral Daily    Vitamin D  1,000 Units Oral Daily    [Held by provider] donepezil  10 mg Oral Nightly    [Held by provider] DULoxetine  30 mg Oral Daily    metoprolol tartrate  25 mg Oral BID    calcium elemental  500 mg Oral Daily    vitamin B-12  50 mcg Oral Daily    insulin lispro  0-12 Units Subcutaneous Q4H    sodium chloride flush  10 mL Intravenous 2 times per day    sodium chloride  15 mL/kg Intravenous Once    insulin regular  0.1 Units/kg Intravenous Once    lidocaine 1 % injection  5 mL Intradermal Once    lidocaine 1 % injection  5 mL Intradermal Once    sodium chloride flush  10 mL Intravenous 2 times per day    aspirin  324 mg Oral Once        IV infusion builder 75 mL/hr at 07/10/20 1023    dextrose Stopped (07/06/20 0625)    sodium chloride         bisacodyl, albuterol sulfate HFA, acetaminophen **OR** acetaminophen, glucose, dextrose, glucagon (rDNA), dextrose, sodium chloride flush, acetaminophen **OR** acetaminophen, polyethylene glycol, promethazine **OR** ondansetron, sodium chloride flush      Problem list:       Patient Active Problem List   Diagnosis Code    Acute lower GI bleeding K92.2    Chronic blood loss anemia D50.0    Dementia ([facility] [address] F03.90    Morbid obesity (HCC) E66.01    Diabetes mellitus ([facility] [address] E11.9    Closed displaced fracture of right patella S82.001A    Status post total right knee replacement Z96.651    Constipation K59.00    Acute kidney injury ([facility] [address] N17.9    DKA, type 1, not at goal [facility]) E10.10    Suspected COVID-19 virus infection Z20.828    Severe sepsis ([facility] [address] A41.9, R85.23    Metabolic encephalopathy U58.91    Hypernatremia E87.0    Nursing home resident Z59.3    History of arterial ischemic stroke Z86.73    Essential hypertension I10    Mixed hyperlipidemia E78.2    History of depression Z86.59    Diarrhea R19.7    Overweight (BMI 25.0-29. 9) E66.3    Encounter for medication counseling Z71.89    Weight loss counseling, encounter for Z71.3       Please note that this chart was generated using Dragon dictation software. Although every effort was made to ensure the accuracy of this automated transcription, some errors in transcription may have occurred inadvertently. If you may need any clarification, please do not hesitate to contact me through EPIC or at the phone number provided below with my electronic signature. Any pictures or media included in this note were obtained after taking informed verbal consent from the patient and with their approval to include those in the patient's medical record.     Marla Polanco MD, MPH  7/10/2020 , 3:04 PM   [facility] Infectious Disease   Office: [phone]  Fax: [phone]  Tuesday AM clinic:   [address] [place] [address], [facility] [address]  Thursday [id number] [facility]

## 2020-07-10 NOTE — PROGRESS NOTES
Updated patients sister, Miriam Acuña, all questions answered, she states she will be here to visit later today.

## 2020-07-10 NOTE — PROGRESS NOTES
[facility] PROGRESS NOTE    7/10/2020 9:35 AM        Name: Madeline Butts . Admitted: 7/5/2020  Primary Care Provider: Beth Vines MD (Tel: [phone])                        [facility] course:   80 y. o. female with history of dementia (without behavioral disturbance), diabetes type 2 with hyperglycemia, gastroesophageal reflux disease,PUD, colon ploys,  hyperlipidemia, essential hypertension, history of CVA, CKD stage III (baseline creatinine 1.2-1.3), who presents from nursing home with reports that patient was found unresponsive in bed.  It is unclear how long patient had been unresponsive, suspected to have been anywhere from 1 to 1.5 hours prior to the time he was found.  Paramedics were called and patient was brought to the emergency room.  The patient on presentation was acute encephalopathy due to metabolic problems such as hypernatremia and acute kidney injury.  And also developed increased beta hydroxybutyrate and hyperglycemia and developed DKA and transferred to ICU on 7/6/2020, started insulin drip.  Anion gap closed.  Insulin drip stopped on 7/7/2020    Subjective: The patient more responsive today almost best  since admission  No acute events overnight. Resting well. Pain control. Diet ok. Labs reviewed  Denies any chest pain sob.      Reviewed interval ancillary notes    Current Medications  dextrose 5 % 1,000 mL infusion, Continuous  pantoprazole (PROTONIX) injection 40 mg, BID  aspirin EC tablet 81 mg, Daily  atorvastatin (LIPITOR) tablet 40 mg, Daily  bisacodyl (DULCOLAX) suppository 10 mg, Daily PRN  Vitamin D (CHOLECALCIFEROL) tablet 1,000 Units, Daily  [Held by provider] donepezil (ARICEPT) tablet 10 mg, Nightly  [Held by provider] DULoxetine (CYMBALTA) extended release capsule 30 mg, Daily  metoprolol tartrate (LOPRESSOR) tablet 25 mg, BID  calcium elemental (OSCAL) tablet 500 mg, Daily  vitamin B-12 (CYANOCOBALAMIN) tablet 50 mcg, Daily  albuterol sulfate  (90 Base) MCG/ACT inhaler 2 puff, Q4H PRN  acetaminophen (TYLENOL) tablet 650 mg, Q6H PRN    Or  acetaminophen (TYLENOL) suppository 650 mg, Q6H PRN  glucose (GLUTOSE) 40 % oral gel 15 g, PRN  dextrose 50 % IV solution, PRN  glucagon (rDNA) injection 1 mg, PRN  dextrose 5 % solution, PRN  insulin lispro (1 Unit Dial) 0-12 Units, Q4H  sodium chloride flush 0.9 % injection 10 mL, 2 times per day  sodium chloride flush 0.9 % injection 10 mL, PRN  acetaminophen (TYLENOL) tablet 650 mg, Q6H PRN    Or  acetaminophen (TYLENOL) suppository 650 mg, Q6H PRN  polyethylene glycol (GLYCOLAX) packet 17 g, Daily PRN  promethazine (PHENERGAN) tablet 12.5 mg, Q6H PRN    Or  ondansetron (ZOFRAN) injection 4 mg, Q6H PRN  0.9 % sodium chloride bolus, Once    Followed by  0.9 % sodium chloride infusion, Continuous  insulin regular (HUMULIN R;NOVOLIN R) injection 8 Units, Once  lidocaine PF 1 % injection 5 mL, Once  lidocaine PF 1 % injection 5 mL, Once  sodium chloride flush 0.9 % injection 10 mL, 2 times per day  sodium chloride flush 0.9 % injection 10 mL, PRN  aspirin chewable tablet 324 mg, Once        Objective:  BP (!) 116/41   Pulse 93   Temp 97.8 °F (36.6 °C) (Temporal)   Resp 12   Ht 5' 5\" (1.651 m)   Wt 167 lb 12.3 oz (76.1 kg)   SpO2 100%   BMI 27.92 kg/m²     Intake/Output Summary (Last 24 hours) at 7/10/2020 0935  Last data filed at 7/10/2020 0906  Gross per 24 hour   Intake 2302 ml   Output 745 ml   Net 1557 ml      Wt Readings from Last 3 Encounters:   07/10/20 167 lb 12.3 oz (76.1 kg)   07/13/18 170 lb 14.4 oz (77.5 kg)   02/28/18 163 lb (73.9 kg)       General appearance:  Appears comfortable  Eyes: Sclera clear. Pupils equal.  ENT: Moist oral mucosa. Trachea midline, no adenopathy. Cardiovascular: Regular rhythm, normal S1, S2. No murmur.  No edema in lower extremities  Respiratory: Not using accessory muscles. Good inspiratory effort. Clear to auscultation bilaterally, no wheeze or crackles. GI: Abdomen soft, no tenderness, not distended, normal bowel sounds  Musculoskeletal: No cyanosis in digits, neck supple  Neurology: CN 2-12 grossly intact. No speech or motor deficits  Psych: Normal affect. Alert and oriented in time, place and person  Skin: Warm, dry, normal turgor    Labs and Tests:  CBC:   Recent Labs     07/09/20  1030 07/09/20  1630 07/10/20  0220 07/10/20  0525   WBC 17.5* 17.5*  --  15.1*   HGB 7.3*  7.2* 7.8* 7.4* 7.6*   HCT 23.2*  23.0* 24.6*  24.7* 23.8* 23.9*   MCV 93.4 93.2  --  92.2    196  --  186     BMP:    Recent Labs     07/09/20  1630 07/09/20  2300 07/10/20  0525   * 149* 151*  152*   K 3.0* 3.1* 3.2*  3.2*   * 119* 120*  121*   CO2 16* 16* 15*  15*   BUN 95* 91* 85*  85*   CREATININE 3.4* 3.3* 3.1*  3.1*   GLUCOSE 170* 116* 100*  103*     Hepatic:   Recent Labs     07/09/20  1630   BILITOT 0.3         Problem List  Active Problems:    CYNTHIA (acute kidney injury) ([facility]    DKA, type 1, not at goal [facility])    Suspected COVID-19 virus infection    Severe [facility]    Metabolic encephalopathy    Hypernatremia    Nursing home resident    History of arterial ischemic stroke    Essential hypertension    Mixed hyperlipidemia    History of depression    Diarrhea  Resolved Problems:    * No resolved hospital problems. *       Assessment & Plan:      1. Acute metabolic encephalopathy.  Could be secondary to multiple electrolyte abnormalities, acute kidney injury, underlying infectious process.  I am able to make a conversation with her she is sleepy however she is answering questions. Timothy Champagne does have underlying dementia at baseline.   2.Acute kidney injury on CKD stage III.  Baseline creatinine 1.2-1.3; creatinine 4.2-->3.7 today, improving, nephrology service following.   3.E. coli UTI significant pyuria noted, significant leukocytosis noted, E. coli growing in the urine culture, we will stopc Zyvox and continue Zosyn day #4.  Continue gentle intravenous fluid. Significant leukocytosis noted, C. difficile toxin pending. 4.Sepsis as manifested by tachycardia, white cell count improving , E. coli UTI, blood cultures pending   5. Acute respiratory failure with hypoxia.  Suspect secondary to underlying pulmonary infectious process.  Currently 96% and room air, posterior right-sided crackles noted COVID-19 negative .   6.Elevated troponin of 0.34, abnormal EKG.  Could be secondary to underlying sepsis and acute kidney injury.  Downtrending, continue with aspirin, statin.  Echocardiogram showed grade 2 diastolic dysfunction     7. Hypernatremia.    Slightly increased today, continue sodium chloride 0.45% 100 ml/h.  Stable   8. Diabetes type 2 currently blood sugar 125,  continue with sliding scale insulin, hold Lantus due to acute kidney injury  9. Anion gap metabolic acidosis with beta hydroxybutyrate increase considered DKA moved to ICU setting yesterday and start DKA protocol.   10. Lactic acidosis.  Likely secondary to underlying sepsis (as noted above). Resolved.   11. Abnormal chest x-ray with increased paratracheal opacity, rule out malignancy.  Consider CT- chest with contrast once renal function stable, especially if family willing to pursue further evaluation to rule out malignancy. 12. History of dementia without behavioral disturbance, continue donezepil  13. Black stools: Patient has history of remote peptic ulcer disease, multiple colonoscopies due to colon polyps,  Gastroenterology service performed upper GI endoscopy on 7/8/2020 with findings of moderate gastritis, biopsy performed no NSAIDs continue IV PPI for 72 hours then switch to oral PPI for 8 weeks twice daily  14. Other comorbidities include gastroesophageal reflux disease, hyperlipidemia, essential hypertension, history of CVA  15.   Out of bed, I requested nursing staff  place and  set up in

## 2020-07-10 NOTE — PROGRESS NOTES
Speech Language Pathology  Dysphagia Treatment Note    Name:  Tilton Cushing  :   1933  Medical Diagnosis:  Acute kidney injury ([facility] [N17.9]  Acute kidney injury ([facility] [N17.9]  DKA, type 1, not at goal [facility]) [E10.10]  Treatment Diagnosis: Oropharyngeal Dysphagia  Pain level:  Pt did not report pain    Current Diet Level:   1)Dysphagia I Pureed with NECTAR thick liquids/no straws/meds (crushed) with applesauce  2)Allow po only when Pt demonstrates sustained alertness and as tolerated    Assessment of Texture Tolerance:  -Impressions: Pt was seen sitting upright in bed, RN reported Pt tolerated breakfast meal well this date. Trials of thin liquids, nectar thick liquids, purees, and soft solids were provided. Thin liquids via cup revealed rapid bolus loss to pharynx, delayed swallow initiation was noted. Improved bolus control and overall tolerance was noted with nectar thick liquids. No overt clinical s/s of aspiration/penetration were assessed with nectar thick liquids. Pt tolerated puree trials with no overt difficulty. Prolonged and reduced mastication was noted with soft solids, with lingual residue noted on left side of lingual surface. Able to clear residue with use of nectar thick liquids. Recommend continuation of current diet at this time. Diet and Treatment Recommendations:  1) Continue Dysphagia I Pureed with NECTAR thick liquids/no straws/meds (crushed) with applesauce  2)Allow po only when Pt demonstrates sustained alertness and as tolerated    Cognitive Assessment/Treatment Goals:  Impressions: Pt awake during session. Pt unable to recall items consumed for breakfast meal or new information presented 5 minutes prior. Pt oriented to self and facility only. Pt disoriented to current month and stated it was  or Feb.  Pt unable to recall current month after 5 minute delay. Goals:  1)Pt will improve orientation to x4, for improved awareness of surroundings and reduced confusion. (ongoing 7/10/2020)   2)Pt will improve short term recall of daily events and newly learned information via graded tasks, to 50%, for improved safety with dysphagia recommendations. (ongoing 7/10/2020)     Plan:  Continued daily Dysphagia treatment with goals per plan of care. Patient/Family Education:Education given to the Pt and nurse, who verbalized understanding    Discharge Recommendations:  Pt will benefit from continued skilled Speech Therapy for Dysphagia services, prior to returning home. Timed Code Treatment: 10 minutes    Total Treatment Time: 24 minutes    If patient discharges prior to next session this note will serve as a discharge summary.      Shirley Coe M.A., [address] [place] [address]  Speech-Language Pathologist

## 2020-07-10 NOTE — PROGRESS NOTES
[facility]   Progress Note      SUBJECTIVE:      Patient more or less same. No fever. OBJECTIVE    Physical  VITALS:  BP (!) 132/56   Pulse 86   Temp 97 °F (36.1 °C) (Temporal)   Resp 12   Ht 5' 5\" (1.651 m)   Wt 167 lb 12.3 oz (76.1 kg)   SpO2 97%   BMI 27.92 kg/m²   TEMPERATURE:  Current - Temp: 97 °F (36.1 °C); Max - Temp  Av.3 °F (36.3 °C)  Min: 97 °F (36.1 °C)  Max: 97.8 °F (36.6 °C)  BLOOD PRESSURE RANGE:  Systolic (98ASZ), ABDIRASHID:551 , Min:113 , NAK:758   ; Diastolic (00QKS), MVB:15, Min:41, Max:56    24HR INTAKE/OUTPUT:      Intake/Output Summary (Last 24 hours) at 7/10/2020 1715  Last data filed at 7/10/2020 1241  Gross per 24 hour   Intake 2402 ml   Output 745 ml   Net 1657 ml     Drowsy  Respiratory efforts normal  No edema  No focal deficits.     Data  Labs:  General Labs:  CBC with Differential:    Lab Results   Component Value Date    WBC 15.1 07/10/2020    RBC 2.59 07/10/2020    HGB 7.6 07/10/2020    HCT 24.6 07/10/2020     07/10/2020    MCV 92.2 07/10/2020    MCH 29.4 07/10/2020    MCHC 31.9 07/10/2020    RDW 17.3 07/10/2020    LYMPHOPCT 6.8 07/10/2020    MONOPCT 3.0 07/10/2020    BASOPCT 0.4 07/10/2020    MONOSABS 0.4 07/10/2020    LYMPHSABS 1.0 07/10/2020    EOSABS 0.2 07/10/2020    BASOSABS 0.1 07/10/2020     BMP:    Lab Results   Component Value Date     07/10/2020    K 3.0 07/10/2020    K 5.4 2020     07/10/2020    CO2 16 07/10/2020    BUN 81 07/10/2020    LABALBU 2.2 2020    CREATININE 3.1 07/10/2020    CALCIUM 8.4 07/10/2020    GFRAA 17 07/10/2020    LABGLOM 14 07/10/2020    GLUCOSE 135 07/10/2020     Hepatic Function Panel:    Lab Results   Component Value Date    ALKPHOS 122 2020    ALT 18 2020    AST 46 2020    PROT 5.5 2020    BILITOT 0.3 2020    BILIDIR <0.2 2020    IBILI see below 2020    LABALBU 2.2 2020     LDH:    Lab Results   Component Value Date     2020     PT/INR: Lab Results   Component Value Date    PROTIME 13.3 07/05/2020    INR 1.14 07/05/2020     PTT:    Lab Results   Component Value Date    APTT 32.9 07/13/2018   [APTT    Current Medications  Current Facility-Administered Medications: dextrose 5 % 1,000 mL infusion, , Intravenous, Continuous  ciprofloxacin (CIPRO) tablet 500 mg, 500 mg, Oral, Daily  lactobacillus (CULTURELLE) capsule 1 capsule, 1 capsule, Oral, BID WC  pantoprazole (PROTONIX) injection 40 mg, 40 mg, Intravenous, BID  aspirin EC tablet 81 mg, 81 mg, Oral, Daily  atorvastatin (LIPITOR) tablet 40 mg, 40 mg, Oral, Daily  bisacodyl (DULCOLAX) suppository 10 mg, 10 mg, Rectal, Daily PRN  Vitamin D (CHOLECALCIFEROL) tablet 1,000 Units, 1,000 Units, Oral, Daily  [Held by provider] donepezil (ARICEPT) tablet 10 mg, 10 mg, Oral, Nightly  [Held by provider] DULoxetine (CYMBALTA) extended release capsule 30 mg, 30 mg, Oral, Daily  metoprolol tartrate (LOPRESSOR) tablet 25 mg, 25 mg, Oral, BID  calcium elemental (OSCAL) tablet 500 mg, 500 mg, Oral, Daily  vitamin B-12 (CYANOCOBALAMIN) tablet 50 mcg, 50 mcg, Oral, Daily  albuterol sulfate  (90 Base) MCG/ACT inhaler 2 puff, 2 puff, Inhalation, Q4H PRN  acetaminophen (TYLENOL) tablet 650 mg, 650 mg, Oral, Q6H PRN **OR** acetaminophen (TYLENOL) suppository 650 mg, 650 mg, Rectal, Q6H PRN  glucose (GLUTOSE) 40 % oral gel 15 g, 15 g, Oral, PRN  dextrose 50 % IV solution, 12.5 g, Intravenous, PRN  glucagon (rDNA) injection 1 mg, 1 mg, Intramuscular, PRN  dextrose 5 % solution, 100 mL/hr, Intravenous, PRN  insulin lispro (1 Unit Dial) 0-12 Units, 0-12 Units, Subcutaneous, Q4H  sodium chloride flush 0.9 % injection 10 mL, 10 mL, Intravenous, 2 times per day  sodium chloride flush 0.9 % injection 10 mL, 10 mL, Intravenous, PRN  acetaminophen (TYLENOL) tablet 650 mg, 650 mg, Oral, Q6H PRN **OR** acetaminophen (TYLENOL) suppository 650 mg, 650 mg, Rectal, Q6H PRN  polyethylene glycol (GLYCOLAX) packet 17 g, 17 g, Oral, Daily PRN  promethazine (PHENERGAN) tablet 12.5 mg, 12.5 mg, Oral, Q6H PRN **OR** ondansetron (ZOFRAN) injection 4 mg, 4 mg, Intravenous, Q6H PRN  0.9 % sodium chloride bolus, 15 mL/kg, Intravenous, Once **FOLLOWED BY** 0.9 % sodium chloride infusion, , Intravenous, Continuous  insulin regular (HUMULIN R;NOVOLIN R) injection 8 Units, 0.1 Units/kg, Intravenous, Once  lidocaine PF 1 % injection 5 mL, 5 mL, Intradermal, Once  lidocaine PF 1 % injection 5 mL, 5 mL, Intradermal, Once  sodium chloride flush 0.9 % injection 10 mL, 10 mL, Intravenous, 2 times per day  sodium chloride flush 0.9 % injection 10 mL, 10 mL, Intravenous, PRN  aspirin chewable tablet 324 mg, 324 mg, Oral, Once    ASSESSMENT AND PLAN    80year old female with a history of dementia, MI, hyperlipidemia, GERD, DM, and CKD. She's admitted with sepsis, metabolic encephalopathy, and CYNTHIA. She has:     1. Leukocytosis:  - WBCs trending downwards  - Peripheral smear - mainly neutrophils, no blasts        2. Anemia:  - Likely multifactorial due to CKD and chronic GI bleed. History of BECKY. - No bleeding found on EGD, family declined colonoscopy at this time.   - No evidence of iron, b12 deficiency or hemolysis. - SPEP pending.       Anju Delarosa MD

## 2020-07-11 LAB
ANION GAP SERPL CALCULATED.3IONS-SCNC: 12 MMOL/L (ref 3–16)
ANION GAP SERPL CALCULATED.3IONS-SCNC: 12 MMOL/L (ref 3–16)
ANISOCYTOSIS: ABNORMAL
BANDED NEUTROPHILS RELATIVE PERCENT: 5 % (ref 0–7)
BASOPHILS ABSOLUTE: 0 K/UL (ref 0–0.2)
BASOPHILS RELATIVE PERCENT: 0 %
BLOOD CULTURE, ROUTINE: NORMAL
BUN BLDV-MCNC: 60 MG/DL (ref 7–20)
BUN BLDV-MCNC: 65 MG/DL (ref 7–20)
CALCIUM SERPL-MCNC: 8.1 MG/DL (ref 8.3–10.6)
CALCIUM SERPL-MCNC: 8.6 MG/DL (ref 8.3–10.6)
CHLORIDE BLD-SCNC: 113 MMOL/L (ref 99–110)
CHLORIDE BLD-SCNC: 113 MMOL/L (ref 99–110)
CO2: 17 MMOL/L (ref 21–32)
CO2: 17 MMOL/L (ref 21–32)
CREAT SERPL-MCNC: 2.8 MG/DL (ref 0.6–1.2)
CREAT SERPL-MCNC: 2.9 MG/DL (ref 0.6–1.2)
CULTURE, BLOOD 2: NORMAL
EOSINOPHILS ABSOLUTE: 0 K/UL (ref 0–0.6)
EOSINOPHILS RELATIVE PERCENT: 0 %
GFR AFRICAN AMERICAN: 19
GFR AFRICAN AMERICAN: 19
GFR NON-AFRICAN AMERICAN: 15
GFR NON-AFRICAN AMERICAN: 16
GLUCOSE BLD-MCNC: 162 MG/DL (ref 70–99)
GLUCOSE BLD-MCNC: 165 MG/DL (ref 70–99)
GLUCOSE BLD-MCNC: 166 MG/DL (ref 70–99)
GLUCOSE BLD-MCNC: 174 MG/DL (ref 70–99)
GLUCOSE BLD-MCNC: 188 MG/DL (ref 70–99)
GLUCOSE BLD-MCNC: 190 MG/DL (ref 70–99)
GLUCOSE BLD-MCNC: 192 MG/DL (ref 70–99)
GLUCOSE BLD-MCNC: 237 MG/DL (ref 70–99)
HCT VFR BLD CALC: 22.2 % (ref 36–48)
HCT VFR BLD CALC: 22.4 % (ref 36–48)
HCT VFR BLD CALC: 23 % (ref 36–48)
HEMOGLOBIN: 7 G/DL (ref 12–16)
HEMOGLOBIN: 7.4 G/DL (ref 12–16)
HEMOGLOBIN: 7.4 G/DL (ref 12–16)
LYMPHOCYTES ABSOLUTE: 1 K/UL (ref 1–5.1)
LYMPHOCYTES RELATIVE PERCENT: 8 %
MACROCYTES: ABNORMAL
MCH RBC QN AUTO: 29 PG (ref 26–34)
MCHC RBC AUTO-ENTMCNC: 31.7 G/DL (ref 31–36)
MCV RBC AUTO: 91.5 FL (ref 80–100)
MICROCYTES: ABNORMAL
MONOCYTES ABSOLUTE: 0.1 K/UL (ref 0–1.3)
MONOCYTES RELATIVE PERCENT: 1 %
NEUTROPHILS ABSOLUTE: 11.3 K/UL (ref 1.7–7.7)
NEUTROPHILS RELATIVE PERCENT: 86 %
OVALOCYTES: ABNORMAL
PDW BLD-RTO: 17.4 % (ref 12.4–15.4)
PERFORMED ON: ABNORMAL
PLATELET # BLD: 194 K/UL (ref 135–450)
PLATELET SLIDE REVIEW: ADEQUATE
PMV BLD AUTO: 8.9 FL (ref 5–10.5)
POTASSIUM SERPL-SCNC: 3 MMOL/L (ref 3.5–5.1)
POTASSIUM SERPL-SCNC: 3 MMOL/L (ref 3.5–5.1)
POTASSIUM SERPL-SCNC: 3.4 MMOL/L (ref 3.5–5.1)
RBC # BLD: 2.43 M/UL (ref 4–5.2)
SLIDE REVIEW: ABNORMAL
SODIUM BLD-SCNC: 142 MMOL/L (ref 136–145)
SODIUM BLD-SCNC: 142 MMOL/L (ref 136–145)
URINE CULTURE, ROUTINE: NORMAL
WBC # BLD: 12.4 K/UL (ref 4–11)

## 2020-07-11 PROCEDURE — 80048 BASIC METABOLIC PNL TOTAL CA: CPT

## 2020-07-11 PROCEDURE — 85018 HEMOGLOBIN: CPT

## 2020-07-11 PROCEDURE — 2580000003 HC RX 258: Performed by: INTERNAL MEDICINE

## 2020-07-11 PROCEDURE — 6370000000 HC RX 637 (ALT 250 FOR IP): Performed by: INTERNAL MEDICINE

## 2020-07-11 PROCEDURE — C9113 INJ PANTOPRAZOLE SODIUM, VIA: HCPCS | Performed by: PHYSICIAN ASSISTANT

## 2020-07-11 PROCEDURE — 85014 HEMATOCRIT: CPT

## 2020-07-11 PROCEDURE — 6360000002 HC RX W HCPCS: Performed by: PHYSICIAN ASSISTANT

## 2020-07-11 PROCEDURE — 84132 ASSAY OF SERUM POTASSIUM: CPT

## 2020-07-11 PROCEDURE — 6360000002 HC RX W HCPCS: Performed by: HOSPITALIST

## 2020-07-11 PROCEDURE — 85025 COMPLETE CBC W/AUTO DIFF WBC: CPT

## 2020-07-11 PROCEDURE — 2580000003 HC RX 258: Performed by: HOSPITALIST

## 2020-07-11 PROCEDURE — 6370000000 HC RX 637 (ALT 250 FOR IP): Performed by: HOSPITALIST

## 2020-07-11 PROCEDURE — 1200000000 HC SEMI PRIVATE

## 2020-07-11 RX ORDER — POTASSIUM CHLORIDE 7.45 MG/ML
10 INJECTION INTRAVENOUS PRN
Status: DISCONTINUED | OUTPATIENT
Start: 2020-07-11 | End: 2020-07-11

## 2020-07-11 RX ORDER — POTASSIUM CHLORIDE 20 MEQ/1
40 TABLET, EXTENDED RELEASE ORAL PRN
Status: DISCONTINUED | OUTPATIENT
Start: 2020-07-11 | End: 2020-07-11

## 2020-07-11 RX ADMIN — Medication 50 MCG: at 09:10

## 2020-07-11 RX ADMIN — DESMOPRESSIN ACETATE 40 MG: 0.2 TABLET ORAL at 09:10

## 2020-07-11 RX ADMIN — VITAMIN D, TAB 1000IU (100/BT) 1000 UNITS: 25 TAB at 09:10

## 2020-07-11 RX ADMIN — Medication 10 ML: at 21:59

## 2020-07-11 RX ADMIN — PANTOPRAZOLE SODIUM 40 MG: 40 INJECTION, POWDER, FOR SOLUTION INTRAVENOUS at 09:10

## 2020-07-11 RX ADMIN — INSULIN LISPRO 2 UNITS: 100 INJECTION, SOLUTION INTRAVENOUS; SUBCUTANEOUS at 05:30

## 2020-07-11 RX ADMIN — ONDANSETRON 4 MG: 2 INJECTION INTRAMUSCULAR; INTRAVENOUS at 09:25

## 2020-07-11 RX ADMIN — INSULIN LISPRO 2 UNITS: 100 INJECTION, SOLUTION INTRAVENOUS; SUBCUTANEOUS at 01:30

## 2020-07-11 RX ADMIN — INSULIN LISPRO 2 UNITS: 100 INJECTION, SOLUTION INTRAVENOUS; SUBCUTANEOUS at 12:15

## 2020-07-11 RX ADMIN — POTASSIUM BICARBONATE 40 MEQ: 782 TABLET, EFFERVESCENT ORAL at 12:15

## 2020-07-11 RX ADMIN — INSULIN LISPRO 4 UNITS: 100 INJECTION, SOLUTION INTRAVENOUS; SUBCUTANEOUS at 22:02

## 2020-07-11 RX ADMIN — DEXTROSE MONOHYDRATE: 50 INJECTION, SOLUTION INTRAVENOUS at 00:30

## 2020-07-11 RX ADMIN — CALCIUM 500 MG: 500 TABLET ORAL at 09:10

## 2020-07-11 RX ADMIN — METOPROLOL TARTRATE 25 MG: 25 TABLET, FILM COATED ORAL at 09:10

## 2020-07-11 RX ADMIN — CIPROFLOXACIN HYDROCHLORIDE 500 MG: 500 TABLET, FILM COATED ORAL at 09:10

## 2020-07-11 RX ADMIN — Medication 10 ML: at 09:11

## 2020-07-11 RX ADMIN — ASPIRIN 81 MG: 81 TABLET, COATED ORAL at 09:10

## 2020-07-11 RX ADMIN — INSULIN LISPRO 2 UNITS: 100 INJECTION, SOLUTION INTRAVENOUS; SUBCUTANEOUS at 08:50

## 2020-07-11 RX ADMIN — PANTOPRAZOLE SODIUM 40 MG: 40 INJECTION, POWDER, FOR SOLUTION INTRAVENOUS at 21:59

## 2020-07-11 RX ADMIN — Medication 1 CAPSULE: at 09:10

## 2020-07-11 RX ADMIN — INSULIN LISPRO 2 UNITS: 100 INJECTION, SOLUTION INTRAVENOUS; SUBCUTANEOUS at 18:11

## 2020-07-11 RX ADMIN — METOPROLOL TARTRATE 25 MG: 25 TABLET, FILM COATED ORAL at 21:59

## 2020-07-11 ASSESSMENT — PAIN SCALES - GENERAL
PAINLEVEL_OUTOF10: 0

## 2020-07-11 ASSESSMENT — PAIN SCALES - WONG BAKER
WONGBAKER_NUMERICALRESPONSE: 0

## 2020-07-11 NOTE — PLAN OF CARE
Problem: Falls - Risk of:  Goal: Will remain free from falls  Description: Will remain free from falls  Outcome: Ongoing     Problem: Skin Integrity:  Goal: Will show no infection signs and symptoms  Description: Will show no infection signs and symptoms  Outcome: Ongoing     Problem: Injury - Risk of, Physical Injury:  Goal: Will remain free from falls  Description: Will remain free from falls  Outcome: Ongoing     Problem: Sensory Perception - Impaired:  Goal: Demonstrations of improved sensory functioning will increase  Description: Demonstrations of improved sensory functioning will increase  Outcome: Ongoing

## 2020-07-11 NOTE — PROGRESS NOTES
Pt today has not had an appetite. She ate about half of her banana at breakfast.  For lunch she had 2 bites of pudding. She states that she does not feel like eating, and that she just wants to rest.  Will try to get her to eat again later.   Beryle Castellani

## 2020-07-11 NOTE — PLAN OF CARE
Problem: Falls - Risk of:  Goal: Will remain free from falls  Description: Will remain free from falls  7/11/2020 1058 by Alford Kayser, RN  Outcome: Ongoing  Pt remains free from falls. Safety precautions in place. Bed in lowest position, bed wheels locked, call light with in reach, bed alarm on, yellow blanket in place, fall risk wrist band on, orange light or SAFE sign outside of doorway. Will continue to monitor.   Alford Kayser, RN    Problem: Skin Integrity:  Goal: Will show no infection signs and symptoms  Description: Will show no infection signs and symptoms  7/11/2020 1058 by Alford Kayser, RN  Outcome: Ongoing    Goal: Absence of new skin breakdown  Description: Absence of new skin breakdown  7/11/2020 1058 by Alford Kayser, RN  Outcome: Ongoing    Goal: Absence of physical injury  Description: Absence of physical injury  7/11/2020 1058 by Alford Kayser, RN  Outcome: Ongoing

## 2020-07-11 NOTE — PROGRESS NOTES
Assessment completed, see doc flowsheets. Pt is alert, awake and orient x self only. Lung sounds are diminished with expiratory wheezes. Tele on, rhythm NSR.  VSS. Medications given per MAR. Call light with in reach, will continue to monitor.

## 2020-07-11 NOTE — PROGRESS NOTES
[facility] PROGRESS NOTE    7/11/2020 9:41 AM        Name: Wayne Martel . Admitted: 7/5/2020  Primary Care Provider: Eduar Khan MD (Tel: [phone])                        Hospital course:   80 y. o. female with history of dementia (without behavioral disturbance), diabetes type 2 with hyperglycemia, gastroesophageal reflux disease,PUD, colon ploys,  hyperlipidemia, essential hypertension, history of CVA, CKD stage III (baseline creatinine 1.2-1.3), who presents from nursing home with reports that patient was found unresponsive in bed.  It is unclear how long patient had been unresponsive, suspected to have been anywhere from 1 to 1.5 hours prior to the time he was found.  Paramedics were called and patient was brought to the emergency room.  The patient on presentation was acute encephalopathy due to metabolic problems such as hypernatremia and acute kidney injury.  And also developed increased beta hydroxybutyrate and hyperglycemia and developed DKA and transferred to ICU on 7/6/2020, started insulin drip.  Anion gap closed.  Insulin drip stopped on 7/7/2020    Subjective: Patient more talkative today    No acute events overnight. Resting well. Pain control. Diet ok. Labs reviewed  Denies any chest pain sob.      Reviewed interval ancillary notes    Current Medications  dextrose 5 % 1,000 mL infusion, Continuous  ciprofloxacin (CIPRO) tablet 500 mg, Daily  lactobacillus (CULTURELLE) capsule 1 capsule, BID WC  pantoprazole (PROTONIX) injection 40 mg, BID  aspirin EC tablet 81 mg, Daily  atorvastatin (LIPITOR) tablet 40 mg, Daily  bisacodyl (DULCOLAX) suppository 10 mg, Daily PRN  Vitamin D (CHOLECALCIFEROL) tablet 1,000 Units, Daily  [Held by provider] donepezil (ARICEPT) tablet 10 mg, Nightly  [Held by provider] DULoxetine (CYMBALTA) extended release capsule 30 No murmur. No edema in lower extremities  Respiratory: Not using accessory muscles. Good inspiratory effort. Clear to auscultation bilaterally, no wheeze or crackles. GI: Abdomen soft, no tenderness, not distended, normal bowel sounds  Musculoskeletal: No cyanosis in digits, neck supple  Neurology: CN 2-12 grossly intact. No speech or motor deficits  Psych: Normal affect. Alert and oriented in time, place and person  Skin: Warm, dry, normal turgor    Labs and Tests:  CBC:   Recent Labs     07/10/20  0525  07/10/20  1750 07/11/20  0336 07/11/20  0939   WBC 15.1*  --  13.4* 12.4*  --    HGB 7.6*   < > 7.4* 7.0* 7.4*   HCT 23.9*   < > 23.8* 22.2* 23.0*   MCV 92.2  --  91.8 91.5  --      --  207 194  --     < > = values in this interval not displayed. BMP:    Recent Labs     07/10/20  1750 07/10/20  2025 07/11/20  0336   * 145 142   K 3.3* 3.3* 3.0*   * 116* 113*   CO2 17* 17* 17*   BUN 74* 72* 65*   CREATININE 2.7* 2.8* 2.9*   GLUCOSE 213* 216* 174*     Hepatic:   Recent Labs     07/09/20  1630   BILITOT 0.3         Problem List  Active Problems:    [facility]    DKA, type 1, not at goal [facility])    Suspected COVID-19 virus infection    Severe sepsis ([facility]    Metabolic encephalopathy    Hypernatremia    Nursing home resident    History of arterial ischemic stroke    Essential hypertension    Mixed hyperlipidemia    History of depression    Diarrhea    Overweight (BMI 25.0-29. 9)    Encounter for medication counseling    Weight loss counseling, encounter for  Resolved Problems:    * No resolved hospital problems. *       Assessment & Plan:      1. Acute metabolic encephalopathy.  Could be secondary to multiple electrolyte abnormalities, acute kidney injury, underlying infectious process.  I am able to make a conversation with her she is sleepy however she is answering questions. Burt Zapata does have underlying dementia at baseline.   2.Acute kidney injury on CKD stage III.  Baseline creatinine 1.2-1.3; creatinine 4.2-->3.7-->2.9 today, improving, nephrology service following.   3.E. coli UTI significant pyuria noted, significant leukocytosis noted, E. coli growing in the urine culture, antibiotic treatment switched to ciprofloxacin by mouth on 7/10 travenous fluid.  Leukocytosis improving, C. difficile toxin come back negative  4. Sepsis as manifested by tachycardia, white cell count improving , E. coli UTI, blood cultures pending   5. Acute respiratory failure with hypoxia.  Suspect secondary to underlying pulmonary infectious process.  Currently 96% and room air, posterior right-sided crackles noted COVID-19 negative .   6.Elevated troponin of 0.34, abnormal EKG.  Could be secondary to underlying sepsis and acute kidney injury.  Downtrending, continue with aspirin, statin.  Echocardiogram showed grade 2 diastolic dysfunction     7. Hypernatremia.   resolved   8. Diabetes type 2 currently blood sugar 162,  continue with sliding scale insulin, hold Lantus due to acute kidney injury  9. Anion gap metabolic acidosis with beta hydroxybutyrate increase considered DKA moved to ICU setting yesterday and start DKA protocol.   10. Lactic acidosis.  Likely secondary to underlying sepsis (as noted above). Resolved.   11. Abnormal chest x-ray with increased paratracheal opacity, rule out malignancy.  Consider CT- chest with contrast once renal function stable, especially if family willing to pursue further evaluation to rule out malignancy. 12. History of dementia without behavioral disturbance, continue donezepil  13. Black stools: Patient has history of remote peptic ulcer disease, multiple colonoscopies due to colon polyps,  Gastroenterology service performed upper GI endoscopy on 7/8/2020 with findings of moderate gastritis, biopsy performed no NSAIDs continue IV PPI for 72 hours then switch to oral PPI for 8 weeks twice daily  14. Anemia: Acute on chronic anemia today is 7.0 and a repeat was 7.4.   We will continue to monitor no signs of symptoms of acute bleeding right now   15. Out of bed encouraged.     Diet: DIET CARB CONTROL; Mildly Thick (Nectar);  No Drinking Straw  Code:DNR-CC  DVT PPX lovenox       Michelle Montero MD   7/11/2020 9:41 AM

## 2020-07-11 NOTE — PROGRESS NOTES
from medical records     Interval hx     BUN and creatinine trended down   UOP improved   On pureed diet now   Sodium level better     I/O last 3 completed shifts: In: 2215 [P.O.:270; I.V.:1945]  Out: 1350 [Urine:1350]  No intake/output data recorded.         Past Medical History:   Diagnosis Date    Arthritis     Chronic kidney disease     Constipation     Dementia ([facility]     Depression     Diabetes mellitus ([facility]     GERD (gastroesophageal reflux disease)     Glaucoma     Hyperlipidemia     Hypertension     MI (myocardial infarction) ([facility]     Muscle weakness (generalized)     Unspecified cerebral artery occlusion with cerebral infarction     UTI (lower urinary tract infection)        Past Surgical History:   Procedure Laterality Date    APPENDECTOMY      BREAST SURGERY      CHOLECYSTECTOMY      COLONOSCOPY      DILATATION, ESOPHAGUS      ENDOSCOPY, COLON, DIAGNOSTIC      EYE SURGERY      HYSTERECTOMY      JOINT REPLACEMENT      SIGMOIDOSCOPY  3/18/15    TONSILLECTOMY      UPPER GASTROINTESTINAL ENDOSCOPY  3/18/15    UPPER GASTROINTESTINAL ENDOSCOPY N/A 7/8/2020    EGD BIOPSY performed by Saima Andrews MD at [address] [place] [address]         Current Medications:    dextrose 5 % 1,000 mL infusion, Continuous  ciprofloxacin (CIPRO) tablet 500 mg, Daily  lactobacillus (CULTURELLE) capsule 1 capsule, BID WC  pantoprazole (PROTONIX) injection 40 mg, BID  aspirin EC tablet 81 mg, Daily  atorvastatin (LIPITOR) tablet 40 mg, Daily  bisacodyl (DULCOLAX) suppository 10 mg, Daily PRN  Vitamin D (CHOLECALCIFEROL) tablet 1,000 Units, Daily  [Held by provider] donepezil (ARICEPT) tablet 10 mg, Nightly  [Held by provider] DULoxetine (CYMBALTA) extended release capsule 30 mg, Daily  metoprolol tartrate (LOPRESSOR) tablet 25 mg, BID  calcium elemental (OSCAL) tablet 500 mg, Daily  vitamin B-12 (CYANOCOBALAMIN) tablet 50 mcg, Daily  albuterol sulfate  (90 Base) MCG/ACT inhaler 2 puff, Q4H PRN  acetaminophen (TYLENOL) tablet 650 mg, Q6H PRN    Or  acetaminophen (TYLENOL) suppository 650 mg, Q6H PRN  glucose (GLUTOSE) 40 % oral gel 15 g, PRN  dextrose 50 % IV solution, PRN  glucagon (rDNA) injection 1 mg, PRN  dextrose 5 % solution, PRN  insulin lispro (1 Unit Dial) 0-12 Units, Q4H  sodium chloride flush 0.9 % injection 10 mL, 2 times per day  sodium chloride flush 0.9 % injection 10 mL, PRN  acetaminophen (TYLENOL) tablet 650 mg, Q6H PRN    Or  acetaminophen (TYLENOL) suppository 650 mg, Q6H PRN  polyethylene glycol (GLYCOLAX) packet 17 g, Daily PRN  promethazine (PHENERGAN) tablet 12.5 mg, Q6H PRN    Or  ondansetron (ZOFRAN) injection 4 mg, Q6H PRN  0.9 % sodium chloride bolus, Once    Followed by  0.9 % sodium chloride infusion, Continuous  insulin regular (HUMULIN R;NOVOLIN R) injection 8 Units, Once  lidocaine PF 1 % injection 5 mL, Once  lidocaine PF 1 % injection 5 mL, Once  sodium chloride flush 0.9 % injection 10 mL, 2 times per day  sodium chloride flush 0.9 % injection 10 mL, PRN  aspirin chewable tablet 324 mg, Once      Physical exam:     Vitals:  BP (!) 156/55   Pulse 72   Temp 97.5 °F (36.4 °C) (Temporal)   Resp 16   Ht 5' 5\" (1.651 m)   Wt 167 lb 12.8 oz (76.1 kg)   SpO2 98%   BMI 27.92 kg/m²   Constitutional:  Spontaneous eye opening , responding to simple commands   Skin: no rash, turgor - oral mucosa dry   Heent:  eomi, mmm  Neck: no bruits or jvd noted  Cardiovascular:  S1, S2 without m/r/g  Respiratory: CTA B without w/r/r  Abdomen:  +bs, soft, nt, nd  Ext: no  lower extremity edema    Labs:  CBC:   Recent Labs     07/10/20  0525 07/10/20  1025 07/10/20  1750 07/11/20  0336   WBC 15.1*  --  13.4* 12.4*   HGB 7.6* 7.6* 7.4* 7.0*     --  207 194     BMP:    Recent Labs     07/10/20  1750 07/10/20  2025 07/11/20  0336   * 145 142   K 3.3* 3.3* 3.0*   * 116* 113*   CO2 17* 17* 17*   BUN 74* 72* 65*   CREATININE 2.7* 2.8* 2.9*   GLUCOSE 213* 216* 174*     Ca/Mg/Phos:   Recent Labs     07/09/20  1030  07/10/20  0525  07/10/20  1750 07/10/20  2025 07/11/20  0336   CALCIUM 8.5   < > 8.4  8.4   < > 8.4 8.1* 8.1*   MG 1.60*  --  2.00  --   --   --   --    PHOS  --   --  3.5  --   --   --   --     < > = values in this interval not displayed. Hepatic:   Recent Labs     07/09/20  1630   BILITOT 0.3     Troponin:   No results for input(s): TROPONINI in the last 72 hours. BNP: No results for input(s): BNP in the last 72 hours. Lipids:   No results for input(s): CHOL, TRIG, HDL, LDLCALC, LABVLDL in the last 72 hours. ABGs: No results for input(s): PHART, PO2ART, KAQ0CVX in the last 72 hours. INR:   No results for input(s): INR in the last 72 hours. UA:  No results for input(s): Laren Pair, GLUCOSEU, BILIRUBINUR, KETUA, SPECGRAV, BLOODU, PHUR, PROTEINU, UROBILINOGEN, NITRU, LEUKOCYTESUR, Anderson Mancilla in the last 72 hours. Urine Microscopic:   No results for input(s): LABCAST, BACTERIA, COMU, HYALCAST, WBCUA, RBCUA, EPIU in the last 72 hours. Urine Culture:   Recent Labs     07/09/20  1325   LABURIN No growth to date  Further report to follow       Urine Chemistry:   No results for input(s): Loera Quivers, PROTEINUR, NAUR in the last 72 hours. IMAGING:  CT CHEST ABDOMEN PELVIS WO CONTRAST   Final Result   Addendum 1 of 1   ADDENDUM:   Also in impression: Right adrenal nodule is slightly larger as compared to   prior exam, indeterminate by CT criteria. Further evaluation could    include   adrenal protocol CT or MR in the nonacute setting. Final      XR CHEST PORTABLE   Final Result   No acute findings         US RENAL COMPLETE   Final Result   Kidneys are bilaterally small. No gross hydronephrosis. 2.2 cm complex appearing cyst at the lower pole of the right kidney. XR CHEST PORTABLE   Final Result   1. No acute airspace consolidation.    2. Increased right paratracheal opacity, and enlargement of the right hilar   shadow, both possibly representing normal vascular anatomy. However,   lymphadenopathy or mass lesions in these locations are less likely diagnostic   considerations, and recommend further characterization with a dedicated chest   CT with contrast.             I/O last 3 completed shifts: In: 2215 [P.O.:270; I.V.:1945]  Out: 1350 [Urine:1350]      Assessment/Plan :      1. CYNTHIA. pre renal   Hold losartan, metformin   CYNTHIA improving slowly   UOP is improving   Recommend to dose adjust all medications  based on renal functions  Maintain SBP> 90 mmHg   Daily weights   AVOID NSAIDs  Avoid Nephrotoxins  Monitor Intake/Output      2. HTN. Controlled BP     3. High anion gap acidosis 2/2 CYNTHIA   AG  30----> 16 ---> 12  Hold metformin     4. AMS 2/2 to metabolic encephalopathy   Hold namenda , aricept , cymbalata till more awake and alert     5. Hypernatremia - 2/2 hypovolemia. Improved   DC iv fluids     6.  Hypokalemia - supplement po                   Thank you for allowing us to participate in care of Lovett Baumgarten         Electronically signed by: Debbie Adrian MD, 7/11/2020, 7:55 AM      [facility] [address]  Office : [phone]  Fax :[phone]

## 2020-07-11 NOTE — PROGRESS NOTES
Assessment completed. See flowchart medication given. Pt is alert and oriented to self only. Pt states no need at this time. Patient encouraged to use call light with any needs. Patient states understanding, call light in reach, bed alarm on. Will continue to monitor.

## 2020-07-12 LAB
ANISOCYTOSIS: ABNORMAL
BANDED NEUTROPHILS RELATIVE PERCENT: 3 % (ref 0–7)
BASOPHILS ABSOLUTE: 0 K/UL (ref 0–0.2)
BASOPHILS RELATIVE PERCENT: 0 %
EOSINOPHILS ABSOLUTE: 0.5 K/UL (ref 0–0.6)
EOSINOPHILS RELATIVE PERCENT: 4 %
GLUCOSE BLD-MCNC: 139 MG/DL (ref 70–99)
GLUCOSE BLD-MCNC: 153 MG/DL (ref 70–99)
GLUCOSE BLD-MCNC: 162 MG/DL (ref 70–99)
GLUCOSE BLD-MCNC: 170 MG/DL (ref 70–99)
GLUCOSE BLD-MCNC: 205 MG/DL (ref 70–99)
HCT VFR BLD CALC: 22.3 % (ref 36–48)
HCT VFR BLD CALC: 22.4 % (ref 36–48)
HCT VFR BLD CALC: 22.9 % (ref 36–48)
HCT VFR BLD CALC: 24.6 % (ref 36–48)
HEMOGLOBIN: 7.3 G/DL (ref 12–16)
HEMOGLOBIN: 7.4 G/DL (ref 12–16)
HEMOGLOBIN: 7.4 G/DL (ref 12–16)
HEMOGLOBIN: 7.9 G/DL (ref 12–16)
HYPOCHROMIA: ABNORMAL
LYMPHOCYTES ABSOLUTE: 2.1 K/UL (ref 1–5.1)
LYMPHOCYTES RELATIVE PERCENT: 16 %
MCH RBC QN AUTO: 30.6 PG (ref 26–34)
MCHC RBC AUTO-ENTMCNC: 33.4 G/DL (ref 31–36)
MCV RBC AUTO: 91.5 FL (ref 80–100)
MONOCYTES ABSOLUTE: 0.8 K/UL (ref 0–1.3)
MONOCYTES RELATIVE PERCENT: 6 %
NEUTROPHILS ABSOLUTE: 9.8 K/UL (ref 1.7–7.7)
NEUTROPHILS RELATIVE PERCENT: 71 %
OVALOCYTES: ABNORMAL
PDW BLD-RTO: 17 % (ref 12.4–15.4)
PERFORMED ON: ABNORMAL
PLATELET # BLD: 193 K/UL (ref 135–450)
PLATELET SLIDE REVIEW: ADEQUATE
PMV BLD AUTO: 8.8 FL (ref 5–10.5)
POIKILOCYTES: ABNORMAL
POLYCHROMASIA: ABNORMAL
RBC # BLD: 2.43 M/UL (ref 4–5.2)
SLIDE REVIEW: ABNORMAL
WBC # BLD: 13.2 K/UL (ref 4–11)

## 2020-07-12 PROCEDURE — 85014 HEMATOCRIT: CPT

## 2020-07-12 PROCEDURE — 2580000003 HC RX 258: Performed by: INTERNAL MEDICINE

## 2020-07-12 PROCEDURE — 36592 COLLECT BLOOD FROM PICC: CPT

## 2020-07-12 PROCEDURE — 85018 HEMOGLOBIN: CPT

## 2020-07-12 PROCEDURE — 85025 COMPLETE CBC W/AUTO DIFF WBC: CPT

## 2020-07-12 PROCEDURE — 6370000000 HC RX 637 (ALT 250 FOR IP): Performed by: INTERNAL MEDICINE

## 2020-07-12 PROCEDURE — 36415 COLL VENOUS BLD VENIPUNCTURE: CPT

## 2020-07-12 PROCEDURE — 6360000002 HC RX W HCPCS: Performed by: PHYSICIAN ASSISTANT

## 2020-07-12 PROCEDURE — 1200000000 HC SEMI PRIVATE

## 2020-07-12 PROCEDURE — 2580000003 HC RX 258: Performed by: HOSPITALIST

## 2020-07-12 PROCEDURE — C9113 INJ PANTOPRAZOLE SODIUM, VIA: HCPCS | Performed by: PHYSICIAN ASSISTANT

## 2020-07-12 RX ORDER — INSULIN LISPRO 100 [IU]/ML
0-12 INJECTION, SOLUTION INTRAVENOUS; SUBCUTANEOUS
Status: DISCONTINUED | OUTPATIENT
Start: 2020-07-12 | End: 2020-07-15 | Stop reason: HOSPADM

## 2020-07-12 RX ADMIN — CIPROFLOXACIN HYDROCHLORIDE 500 MG: 500 TABLET, FILM COATED ORAL at 08:38

## 2020-07-12 RX ADMIN — Medication 10 ML: at 21:39

## 2020-07-12 RX ADMIN — Medication 10 ML: at 10:30

## 2020-07-12 RX ADMIN — PANTOPRAZOLE SODIUM 40 MG: 40 INJECTION, POWDER, FOR SOLUTION INTRAVENOUS at 08:37

## 2020-07-12 RX ADMIN — Medication 10 ML: at 08:37

## 2020-07-12 RX ADMIN — DEXTROSE MONOHYDRATE 100 ML/HR: 50 INJECTION, SOLUTION INTRAVENOUS at 01:55

## 2020-07-12 RX ADMIN — INSULIN LISPRO 2 UNITS: 100 INJECTION, SOLUTION INTRAVENOUS; SUBCUTANEOUS at 08:36

## 2020-07-12 RX ADMIN — DESMOPRESSIN ACETATE 40 MG: 0.2 TABLET ORAL at 08:37

## 2020-07-12 RX ADMIN — VITAMIN D, TAB 1000IU (100/BT) 1000 UNITS: 25 TAB at 08:38

## 2020-07-12 RX ADMIN — Medication 10 ML: at 21:38

## 2020-07-12 RX ADMIN — METOPROLOL TARTRATE 25 MG: 25 TABLET, FILM COATED ORAL at 08:37

## 2020-07-12 RX ADMIN — PANTOPRAZOLE SODIUM 40 MG: 40 INJECTION, POWDER, FOR SOLUTION INTRAVENOUS at 21:38

## 2020-07-12 RX ADMIN — Medication 1 CAPSULE: at 08:37

## 2020-07-12 RX ADMIN — ASPIRIN 81 MG: 81 TABLET, COATED ORAL at 08:38

## 2020-07-12 RX ADMIN — CALCIUM 500 MG: 500 TABLET ORAL at 08:38

## 2020-07-12 RX ADMIN — INSULIN LISPRO 2 UNITS: 100 INJECTION, SOLUTION INTRAVENOUS; SUBCUTANEOUS at 21:46

## 2020-07-12 RX ADMIN — Medication 50 MCG: at 12:01

## 2020-07-12 RX ADMIN — INSULIN LISPRO 4 UNITS: 100 INJECTION, SOLUTION INTRAVENOUS; SUBCUTANEOUS at 01:52

## 2020-07-12 RX ADMIN — METOPROLOL TARTRATE 25 MG: 25 TABLET, FILM COATED ORAL at 21:37

## 2020-07-12 RX ADMIN — Medication 1 CAPSULE: at 17:22

## 2020-07-12 RX ADMIN — INSULIN LISPRO 2 UNITS: 100 INJECTION, SOLUTION INTRAVENOUS; SUBCUTANEOUS at 17:21

## 2020-07-12 ASSESSMENT — PAIN SCALES - GENERAL
PAINLEVEL_OUTOF10: 0

## 2020-07-12 NOTE — PROGRESS NOTES
Reassessment complete. See flowsheet. No acute changes at this time. Patient repositioned in bed and warm blanket provided. Fall precautions in place, call light in reach. Will continue to monitor.

## 2020-07-12 NOTE — PROGRESS NOTES
Assessment complete. See flow sheet. Pain evaluation complete. No complaints of pain at this time. Discussed POC for this shift. Patient is alert, oriented to self only. Incontinent of stool, diaper and bed pad changed, patient repositioned in bed. Garrett patent, draining clear, yellow urine. Patient encouraged to use call light with any needs. Patient states understanding, call light in reach, bed alarm on. Will continue to monitor.

## 2020-07-12 NOTE — PROGRESS NOTES
1,000 Units, Daily  [Held by provider] donepezil (ARICEPT) tablet 10 mg, Nightly  [Held by provider] DULoxetine (CYMBALTA) extended release capsule 30 mg, Daily  metoprolol tartrate (LOPRESSOR) tablet 25 mg, BID  calcium elemental (OSCAL) tablet 500 mg, Daily  vitamin B-12 (CYANOCOBALAMIN) tablet 50 mcg, Daily  albuterol sulfate  (90 Base) MCG/ACT inhaler 2 puff, Q4H PRN  acetaminophen (TYLENOL) tablet 650 mg, Q6H PRN    Or  acetaminophen (TYLENOL) suppository 650 mg, Q6H PRN  glucose (GLUTOSE) 40 % oral gel 15 g, PRN  dextrose 50 % IV solution, PRN  glucagon (rDNA) injection 1 mg, PRN  dextrose 5 % solution, PRN  insulin lispro (1 Unit Dial) 0-12 Units, Q4H  sodium chloride flush 0.9 % injection 10 mL, 2 times per day  sodium chloride flush 0.9 % injection 10 mL, PRN  acetaminophen (TYLENOL) tablet 650 mg, Q6H PRN    Or  acetaminophen (TYLENOL) suppository 650 mg, Q6H PRN  polyethylene glycol (GLYCOLAX) packet 17 g, Daily PRN  promethazine (PHENERGAN) tablet 12.5 mg, Q6H PRN    Or  ondansetron (ZOFRAN) injection 4 mg, Q6H PRN  0.9 % sodium chloride bolus, Once    Followed by  0.9 % sodium chloride infusion, Continuous  insulin regular (HUMULIN R;NOVOLIN R) injection 8 Units, Once  lidocaine PF 1 % injection 5 mL, Once  lidocaine PF 1 % injection 5 mL, Once  sodium chloride flush 0.9 % injection 10 mL, 2 times per day  sodium chloride flush 0.9 % injection 10 mL, PRN  aspirin chewable tablet 324 mg, Once        Objective:  BP (!) 161/63   Pulse 82   Temp 97.5 °F (36.4 °C) (Temporal)   Resp 18   Ht 5' 5\" (1.651 m)   Wt 167 lb 12.8 oz (76.1 kg)   SpO2 100%   BMI 27.92 kg/m²     Intake/Output Summary (Last 24 hours) at 7/12/2020 0919  Last data filed at 7/11/2020 5342  Gross per 24 hour   Intake 20 ml   Output 650 ml   Net -630 ml      Wt Readings from Last 3 Encounters:   07/11/20 167 lb 12.8 oz (76.1 kg)   07/13/18 170 lb 14.4 oz (77.5 kg)   02/28/18 163 lb (73.9 kg)       General appearance: Elderly lady, -American, obese appears comfortable  Eyes: Sclera clear. Pupils equal.  ENT: Moist oral mucosa. Trachea midline, no adenopathy. Cardiovascular: Regular rhythm, normal S1, S2. No murmur. No edema in lower extremities  Respiratory: Not using accessory muscles. Good inspiratory effort. Clear to auscultation bilaterally, no wheeze or crackles. GI: Abdomen soft, no tenderness, not distended, normal bowel sounds  Musculoskeletal: No cyanosis in digits, neck supple  Neurology: CN 2-12 grossly intact. No speech or motor deficits  Psych: Normal affect. Alert and oriented in time, place and person  Skin: Warm, dry, normal turgor    Labs and Tests:  CBC:   Recent Labs     07/10/20  1750 07/11/20  0336 07/11/20  0939 07/11/20  1706 07/12/20  0145   WBC 13.4* 12.4*  --   --  13.2*   HGB 7.4* 7.0* 7.4* 7.4* 7.4*  7.3*   HCT 23.8* 22.2* 23.0* 22.4* 22.3*  22.4*   MCV 91.8 91.5  --   --  91.5    194  --   --  193     BMP:    Recent Labs     07/10/20  2025 07/11/20  0336 07/11/20  0939 07/11/20  1706    142 142  --    K 3.3* 3.0* 3.0* 3.4*   * 113* 113*  --    CO2 17* 17* 17*  --    BUN 72* 65* 60*  --    CREATININE 2.8* 2.9* 2.8*  --    GLUCOSE 216* 174* 165*  --      Hepatic:   Recent Labs     07/09/20  1630   BILITOT 0.3         Problem List  Active Problems:    Acute kidney injury ([facility] [address]    DKA, type 1, not at goal [facility])    Suspected COVID-19 virus infection    Severe sepsis ([facility] [address]    Metabolic encephalopathy    Hypernatremia    Nursing home resident    History of arterial ischemic stroke    Essential hypertension    Mixed hyperlipidemia    History of depression    Diarrhea    Overweight (BMI 25.0-29. 9)    Encounter for medication counseling    Weight loss counseling, encounter for  Resolved Problems:    * No resolved hospital problems. *       Assessment & Plan: 1. Acute metabolic encephalopathy.  Could be secondary to multiple electrolyte abnormalities, acute kidney injury, underlying infectious process.  I am able to make a conversation with her she is sleepy however she is answering questions. I think patient reached to baseline level of cognition however underlying dementia noted. 2.Acute kidney injury on CKD stage III.  Baseline creatinine 1.2-1.3; creatinine 4.2-->3.7-->2.9-->2.8 today, improving, nephrology service following.   3.E. coli UTI significant pyuria noted, significant leukocytosis noted, E. coli growing in the urine culture, antibiotic treatment switched to ciprofloxacin by mouth on 7/10 , ID signed off on 7/10 , C. difficile toxin come back negative  4. Sepsis as manifested by tachycardia, white cell count improving , E. coli UTI, blood cultures pending   5. Acute respiratory failure with hypoxia.  Suspect secondary to underlying pulmonary infectious process.  Currently 96% and room air, posterior right-sided crackles noted COVID-19 negative .   6.Elevated troponin of 0.34, abnormal EKG.  Could be secondary to underlying sepsis and acute kidney injury.  Downtrending, continue with aspirin, statin.  Echocardiogram showed grade 2 diastolic dysfunction     7. Hypernatremia.   resolved   8. Diabetes type 2 currently blood sugar 153,  continue with sliding scale insulin, hold Lantus due to acute kidney injury  9. Anion gap metabolic acidosis with beta hydroxybutyrate increase considered DKA moved to ICU setting yesterday and start DKA protocol.   10. Lactic acidosis.  Likely secondary to underlying sepsis (as noted above). Resolved.   11. Abnormal chest x-ray with increased paratracheal opacity, rule out malignancy.  Consider CT- chest with contrast once renal function stable, especially if family willing to pursue further evaluation to rule out malignancy.   12. History of dementia without behavioral disturbance, continue donezepil  13. Black stools: Patient has history of remote peptic ulcer disease, multiple colonoscopies due to colon polyps,  Gastroenterology service performed upper GI endoscopy on 7/8/2020 with findings of moderate gastritis, biopsy performed no NSAIDs continue IV PPI for 72 hours then switch to oral PPI for 8 weeks twice daily  14. Anemia: Acute on chronic anemia today is 7.0 and a repeat was 7.4. We will continue to monitor no signs of symptoms of acute bleeding. 15.  Physical deconditioning: PT and OT will be ordered[place] [facility]. Diet: DIET CARB CONTROL; Mildly Thick (Nectar);  No Drinking Straw  Code:DNR-CC  DVT PPX lovenox       Ludy Barcenas MD   7/12/2020 9:19 AM

## 2020-07-12 NOTE — PLAN OF CARE
Problem: Falls - Risk of:  Goal: Will remain free from falls  Description: Will remain free from falls  Outcome: Ongoing   Fall precautions in place      Problem: Skin Integrity:  Goal: Will show no infection signs and symptoms  Description: Will show no infection signs and symptoms  Outcome: Ongoing   Skin care precautions in place   Problem: Discharge Planning:  Goal: Ability to perform activities of daily living will improve  Description: Ability to perform activities of daily living will improve  Outcome: Ongoing   Return to ECF    Problem: Psychomotor Activity - Altered:  Goal: Absence of psychomotor disturbance signs and symptoms  Description: Absence of psychomotor disturbance signs and symptoms  Outcome: Ongoing   Pt needs to be fed, but after she eats some and drinks a little netar thick, Pt becomes wheezy. Problem: Serum Glucose Level - Abnormal:  Goal: Ability to maintain appropriate glucose levels will improve  Description: Ability to maintain appropriate glucose levels will improve  Outcome: Ongoing   Will monitor & medicate as ordered.

## 2020-07-12 NOTE — PROGRESS NOTES
from medical records     Interval hx     BUN and creatinine trended down   UOP improved   Oral intake better   Sodium level better     I/O last 3 completed shifts:   In: 27 [I.V.:30]  Out: 0 [Urine:650]  I/O this shift:  In: 80 [P.O.:120]  Out: -         Past Medical History:   Diagnosis Date    Arthritis     Chronic kidney disease     Constipation     Dementia ([facility]     Depression     Diabetes mellitus (HCC)     GERD (gastroesophageal reflux disease)     Glaucoma     Hyperlipidemia     Hypertension     MI (myocardial infarction) ([facility]     Muscle weakness (generalized)     Unspecified cerebral artery occlusion with cerebral infarction     UTI (lower urinary tract infection)        Past Surgical History:   Procedure Laterality Date    APPENDECTOMY      BREAST SURGERY      CHOLECYSTECTOMY      COLONOSCOPY      DILATATION, ESOPHAGUS      ENDOSCOPY, COLON, DIAGNOSTIC      EYE SURGERY      HYSTERECTOMY      JOINT REPLACEMENT      SIGMOIDOSCOPY  3/18/15    TONSILLECTOMY      UPPER GASTROINTESTINAL ENDOSCOPY  3/18/15    UPPER GASTROINTESTINAL ENDOSCOPY N/A 7/8/2020    EGD BIOPSY performed by Anupama Rodrigues MD at [address]         Current Medications:    insulin lispro (1 Unit Dial) 0-12 Units, 4x Daily AC & HS  dextrose 5 % 1,000 mL infusion, Continuous  ciprofloxacin (CIPRO) tablet 500 mg, Daily  lactobacillus (CULTURELLE) capsule 1 capsule, BID WC  pantoprazole (PROTONIX) injection 40 mg, BID  aspirin EC tablet 81 mg, Daily  atorvastatin (LIPITOR) tablet 40 mg, Daily  bisacodyl (DULCOLAX) suppository 10 mg, Daily PRN  Vitamin D (CHOLECALCIFEROL) tablet 1,000 Units, Daily  [Held by provider] donepezil (ARICEPT) tablet 10 mg, Nightly  [Held by provider] DULoxetine (CYMBALTA) extended release capsule 30 mg, Daily  metoprolol tartrate (LOPRESSOR) tablet 25 mg, BID  calcium elemental (OSCAL) tablet 500 mg, Daily  vitamin B-12 (CYANOCOBALAMIN) tablet 50 mcg, Daily  albuterol sulfate  (90 Base) MCG/ACT inhaler 2 puff, Q4H PRN  acetaminophen (TYLENOL) tablet 650 mg, Q6H PRN    Or  acetaminophen (TYLENOL) suppository 650 mg, Q6H PRN  glucose (GLUTOSE) 40 % oral gel 15 g, PRN  dextrose 50 % IV solution, PRN  glucagon (rDNA) injection 1 mg, PRN  dextrose 5 % solution, PRN  sodium chloride flush 0.9 % injection 10 mL, 2 times per day  sodium chloride flush 0.9 % injection 10 mL, PRN  acetaminophen (TYLENOL) tablet 650 mg, Q6H PRN    Or  acetaminophen (TYLENOL) suppository 650 mg, Q6H PRN  polyethylene glycol (GLYCOLAX) packet 17 g, Daily PRN  promethazine (PHENERGAN) tablet 12.5 mg, Q6H PRN    Or  ondansetron (ZOFRAN) injection 4 mg, Q6H PRN  0.9 % sodium chloride bolus, Once    Followed by  0.9 % sodium chloride infusion, Continuous  insulin regular (HUMULIN R;NOVOLIN R) injection 8 Units, Once  lidocaine PF 1 % injection 5 mL, Once  lidocaine PF 1 % injection 5 mL, Once  sodium chloride flush 0.9 % injection 10 mL, 2 times per day  sodium chloride flush 0.9 % injection 10 mL, PRN  aspirin chewable tablet 324 mg, Once      Physical exam:     Vitals:  BP (!) 161/63   Pulse 105   Temp 97.5 °F (36.4 °C) (Temporal)   Resp 20   Ht 5' 5\" (1.651 m)   Wt 167 lb 12.8 oz (76.1 kg)   SpO2 100%   BMI 27.92 kg/m²   Constitutional:  Spontaneous eye opening , responding to simple commands   Skin: no rash, turgor - oral mucosa dry   Heent:  eomi, mmm  Neck: no bruits or jvd noted  Cardiovascular:  S1, S2 without m/r/g  Respiratory: CTA B without w/r/r  Abdomen:  +bs, soft, nt, nd  Ext: no  lower extremity edema    Labs:  CBC:   Recent Labs     07/10/20  1750 07/11/20  0336  07/11/20  1706 07/12/20  0145 07/12/20  1030   WBC 13.4* 12.4*  --   --  13.2*  --    HGB 7.4* 7.0*   < > 7.4* 7.4*  7.3* 7.9*    194  --   --  193  --     < > = values in this interval not displayed.      BMP:    Recent Labs     07/10/20  2025 07/11/20  0336 07/11/20  0939 07/11/20  1706    142 142  --    K 3.3* 3.0* 3.0* 3.4* * 113* 113*  --    CO2 17* 17* 17*  --    BUN 72* 65* 60*  --    CREATININE 2.8* 2.9* 2.8*  --    GLUCOSE 216* 174* 165*  --      Ca/Mg/Phos:   Recent Labs     07/10/20  0525  07/10/20  2025 07/11/20  0336 07/11/20  0939   CALCIUM 8.4  8.4   < > 8.1* 8.1* 8.6   MG 2.00  --   --   --   --    PHOS 3.5  --   --   --   --     < > = values in this interval not displayed. Hepatic:   Recent Labs     07/09/20  1630   BILITOT 0.3     Troponin:   No results for input(s): TROPONINI in the last 72 hours. BNP: No results for input(s): BNP in the last 72 hours. Lipids:   No results for input(s): CHOL, TRIG, HDL, LDLCALC, LABVLDL in the last 72 hours. ABGs: No results for input(s): PHART, PO2ART, GLT4ZBW in the last 72 hours. INR:   No results for input(s): INR in the last 72 hours. UA:  No results for input(s): [place], GLUCOSEU, BILIRUBINUR, KETUA, SPECGRAV, BLOODU, PHUR, PROTEINU, UROBILINOGEN, NITRU, LEUKOCYTESUR, Liza Fray in the last 72 hours. Urine Microscopic:   No results for input(s): LABCAST, BACTERIA, COMU, HYALCAST, WBCUA, RBCUA, EPIU in the last 72 hours. Urine Culture:   Recent Labs     07/09/20  1325   LABURIN No growth after 48 hours     Urine Chemistry:   No results for input(s): CLUR, LABCREA, PROTEINUR, NAUR in the last 72 hours. IMAGING:  CT CHEST ABDOMEN PELVIS WO CONTRAST   Final Result   Addendum 1 of 1   ADDENDUM:   Also in impression: Right adrenal nodule is slightly larger as compared to   prior exam, indeterminate by CT criteria. Further evaluation could    include   adrenal protocol CT or MR in the nonacute setting. Final      XR CHEST PORTABLE   Final Result   No acute findings         US RENAL COMPLETE   Final Result   Kidneys are bilaterally small. No gross hydronephrosis. 2.2 cm complex appearing cyst at the lower pole of the right kidney. XR CHEST PORTABLE   Final Result   1. No acute airspace consolidation.    2. Increased right paratracheal opacity, and enlargement of the right hilar   shadow, both possibly representing normal vascular anatomy. However,   lymphadenopathy or mass lesions in these locations are less likely diagnostic   considerations, and recommend further characterization with a dedicated chest   CT with contrast.             I/O last 3 completed shifts: In: 30 [I.V.:30]  Out: 650 [Urine:650]      Assessment/Plan :      1. CYNTHIA. pre renal   Hold losartan, metformin   CYNTHIA improving slowly   UOP is improving   Recommend to dose adjust all medications  based on renal functions  Maintain SBP> 90 mmHg   Daily weights   AVOID NSAIDs  Avoid Nephrotoxins  Monitor Intake/Output      2. HTN. Controlled BP     3. High anion gap acidosis 2/2 CYNTHIA   Improved   Hold metformin     4. AMS 2/2 to metabolic encephalopathy   namenda , aricept , cymbalata was held    5. Hypernatremia - 2/2 hypovolemia. Improved   \    6.  Hypokalemia - supplement po                   Thank you for allowing us to participate in care of Gerber Ruddy         Electronically signed by: Francine King MD, 7/12/2020, 11:10 AM      [facility] [address]  Office : [phone]  Fax :[phone]

## 2020-07-13 LAB
ANION GAP SERPL CALCULATED.3IONS-SCNC: 13 MMOL/L (ref 3–16)
BASOPHILS ABSOLUTE: 0 K/UL (ref 0–0.2)
BASOPHILS ABSOLUTE: 0 K/UL (ref 0–0.2)
BASOPHILS RELATIVE PERCENT: 0.2 %
BASOPHILS RELATIVE PERCENT: 0.4 %
BUN BLDV-MCNC: 34 MG/DL (ref 7–20)
CALCIUM SERPL-MCNC: 8.3 MG/DL (ref 8.3–10.6)
CHLORIDE BLD-SCNC: 110 MMOL/L (ref 99–110)
CO2: 18 MMOL/L (ref 21–32)
CREAT SERPL-MCNC: 2.3 MG/DL (ref 0.6–1.2)
EOSINOPHILS ABSOLUTE: 0.3 K/UL (ref 0–0.6)
EOSINOPHILS ABSOLUTE: 0.3 K/UL (ref 0–0.6)
EOSINOPHILS RELATIVE PERCENT: 2.1 %
EOSINOPHILS RELATIVE PERCENT: 2.6 %
GFR AFRICAN AMERICAN: 24
GFR NON-AFRICAN AMERICAN: 20
GLUCOSE BLD-MCNC: 104 MG/DL (ref 70–99)
GLUCOSE BLD-MCNC: 105 MG/DL (ref 70–99)
GLUCOSE BLD-MCNC: 113 MG/DL (ref 70–99)
GLUCOSE BLD-MCNC: 125 MG/DL (ref 70–99)
GLUCOSE BLD-MCNC: 88 MG/DL (ref 70–99)
HCT VFR BLD CALC: 23.8 % (ref 36–48)
HCT VFR BLD CALC: 26.2 % (ref 36–48)
HEMOGLOBIN: 7.6 G/DL (ref 12–16)
HEMOGLOBIN: 8.3 G/DL (ref 12–16)
LYMPHOCYTES ABSOLUTE: 0.9 K/UL (ref 1–5.1)
LYMPHOCYTES ABSOLUTE: 0.9 K/UL (ref 1–5.1)
LYMPHOCYTES RELATIVE PERCENT: 6.9 %
LYMPHOCYTES RELATIVE PERCENT: 7 %
MAGNESIUM: 1.4 MG/DL (ref 1.8–2.4)
MCH RBC QN AUTO: 28.9 PG (ref 26–34)
MCH RBC QN AUTO: 28.9 PG (ref 26–34)
MCHC RBC AUTO-ENTMCNC: 31.9 G/DL (ref 31–36)
MCHC RBC AUTO-ENTMCNC: 32 G/DL (ref 31–36)
MCV RBC AUTO: 90.4 FL (ref 80–100)
MCV RBC AUTO: 90.8 FL (ref 80–100)
MONOCYTES ABSOLUTE: 0.4 K/UL (ref 0–1.3)
MONOCYTES ABSOLUTE: 0.4 K/UL (ref 0–1.3)
MONOCYTES RELATIVE PERCENT: 3.3 %
MONOCYTES RELATIVE PERCENT: 3.3 %
NEUTROPHILS ABSOLUTE: 11.5 K/UL (ref 1.7–7.7)
NEUTROPHILS ABSOLUTE: 11.9 K/UL (ref 1.7–7.7)
NEUTROPHILS RELATIVE PERCENT: 87 %
NEUTROPHILS RELATIVE PERCENT: 87.2 %
PDW BLD-RTO: 17.1 % (ref 12.4–15.4)
PDW BLD-RTO: 17.4 % (ref 12.4–15.4)
PERFORMED ON: ABNORMAL
PERFORMED ON: NORMAL
PHOSPHORUS: 3.1 MG/DL (ref 2.5–4.9)
PLATELET # BLD: 218 K/UL (ref 135–450)
PLATELET # BLD: 219 K/UL (ref 135–450)
PMV BLD AUTO: 8.7 FL (ref 5–10.5)
PMV BLD AUTO: 9 FL (ref 5–10.5)
POTASSIUM SERPL-SCNC: 3.4 MMOL/L (ref 3.5–5.1)
RBC # BLD: 2.64 M/UL (ref 4–5.2)
RBC # BLD: 2.88 M/UL (ref 4–5.2)
SODIUM BLD-SCNC: 141 MMOL/L (ref 136–145)
WBC # BLD: 13.2 K/UL (ref 4–11)
WBC # BLD: 13.6 K/UL (ref 4–11)

## 2020-07-13 PROCEDURE — 83735 ASSAY OF MAGNESIUM: CPT

## 2020-07-13 PROCEDURE — 85025 COMPLETE CBC W/AUTO DIFF WBC: CPT

## 2020-07-13 PROCEDURE — 6370000000 HC RX 637 (ALT 250 FOR IP): Performed by: INTERNAL MEDICINE

## 2020-07-13 PROCEDURE — 1200000000 HC SEMI PRIVATE

## 2020-07-13 PROCEDURE — 84100 ASSAY OF PHOSPHORUS: CPT

## 2020-07-13 PROCEDURE — 6360000002 HC RX W HCPCS: Performed by: PHYSICIAN ASSISTANT

## 2020-07-13 PROCEDURE — 92526 ORAL FUNCTION THERAPY: CPT

## 2020-07-13 PROCEDURE — C9113 INJ PANTOPRAZOLE SODIUM, VIA: HCPCS | Performed by: PHYSICIAN ASSISTANT

## 2020-07-13 PROCEDURE — 80048 BASIC METABOLIC PNL TOTAL CA: CPT

## 2020-07-13 PROCEDURE — 97129 THER IVNTJ 1ST 15 MIN: CPT

## 2020-07-13 PROCEDURE — 2580000003 HC RX 258: Performed by: HOSPITALIST

## 2020-07-13 PROCEDURE — 36415 COLL VENOUS BLD VENIPUNCTURE: CPT

## 2020-07-13 PROCEDURE — 2580000003 HC RX 258: Performed by: INTERNAL MEDICINE

## 2020-07-13 RX ADMIN — Medication 10 ML: at 09:00

## 2020-07-13 RX ADMIN — VITAMIN D, TAB 1000IU (100/BT) 1000 UNITS: 25 TAB at 09:53

## 2020-07-13 RX ADMIN — Medication 10 ML: at 21:35

## 2020-07-13 RX ADMIN — Medication 1 CAPSULE: at 09:53

## 2020-07-13 RX ADMIN — METOPROLOL TARTRATE 25 MG: 25 TABLET, FILM COATED ORAL at 21:36

## 2020-07-13 RX ADMIN — PANTOPRAZOLE SODIUM 40 MG: 40 INJECTION, POWDER, FOR SOLUTION INTRAVENOUS at 21:34

## 2020-07-13 RX ADMIN — CALCIUM 500 MG: 500 TABLET ORAL at 09:53

## 2020-07-13 RX ADMIN — ASPIRIN 81 MG: 81 TABLET, COATED ORAL at 09:53

## 2020-07-13 RX ADMIN — DESMOPRESSIN ACETATE 40 MG: 0.2 TABLET ORAL at 09:53

## 2020-07-13 RX ADMIN — METOPROLOL TARTRATE 25 MG: 25 TABLET, FILM COATED ORAL at 09:53

## 2020-07-13 RX ADMIN — CIPROFLOXACIN HYDROCHLORIDE 500 MG: 500 TABLET, FILM COATED ORAL at 09:53

## 2020-07-13 RX ADMIN — PANTOPRAZOLE SODIUM 40 MG: 40 INJECTION, POWDER, FOR SOLUTION INTRAVENOUS at 09:52

## 2020-07-13 RX ADMIN — Medication 10 ML: at 09:53

## 2020-07-13 RX ADMIN — Medication 10 ML: at 21:34

## 2020-07-13 ASSESSMENT — PAIN SCALES - GENERAL
PAINLEVEL_OUTOF10: 0

## 2020-07-13 ASSESSMENT — PAIN SCALES - PAIN ASSESSMENT IN ADVANCED DEMENTIA (PAINAD)
TOTALSCORE: 0
CONSOLABILITY: 0
NEGVOCALIZATION: 0
FACIALEXPRESSION: 0
BREATHING: 0
BODYLANGUAGE: 0

## 2020-07-13 NOTE — PROGRESS NOTES
Speech Language Pathology  Dysphagia Treatment Note    Name:  Lora Wagner  :   1933  Medical Diagnosis:  Acute kidney injury ([facility] [N17.9]  Acute kidney injury ([facility] [address] [N17.9]  DKA, type 1, not at goal [facility]) [E10.10]  Treatment Diagnosis: Oropharyngeal Dysphagia  Pain level:  Pt did not report pain    Current Diet Level:   1)Diet advanced to Carb control with nectar thick liquids per nurse on 7/10/20. Current nurse is unaware of reason for diet advance but reports it may have been in error. Current nurse also reports Pt has eaten very little over the past several days. Dysphagia I Pureed with NECTAR thick liquids/no straws/meds (crushed) with applesauce, was recommended per SLP on 7/10/20. 2)Allow po only when Pt demonstrates sustained alertness and as tolerated    Assessment of Texture Tolerance:  -Impressions: Pt sleeping upon my arrival but was easily awakened with repositioning and verbal prompts. Po trials given to assess for texture tolerance. Pt demonstrates good bolus control and A-P propulsion with nectar thick liquids, purees and very soft solids. Rapid and premature bolus loss to pharynx noted with thin liquids and prolonged mastication with impaired oral clearing and pocketing in the L lateral sulcus noted with solids. Pt unable to independently clear solid from oral cavity and required manual removal. Clinical symptoms of penetration/aspiration noted with thins. No overt signs of aspiration noted with nectar thick, puree and soft solids. However increased delayed wheezing was noted with all textures over successive po trials. Diet and Treatment Recommendations:  1) Dysphagia II Minced and Moist with NECTAR thick liquids/no straws/meds (crushed) with applesauce  2)Allow po only when Pt demonstrates sustained alertness and as tolerated    Cognitive Assessment/Treatment Goals:  Impressions: pt awake/alert and both eyes opened throughout treatment session.  Pt currently oriented to place and month. Pt able to recall year with mild prompts. Pt unable to recall recent events, prior dysphagia recommendations or rationale for current diet level. Goals:  1)Pt will improve orientation to x4, for improved awareness of surroundings and reduced confusion. (ongoing 7/13/2020)   2)Pt will improve short term recall of daily events and newly learned information via graded tasks, to 50%, for improved safety with dysphagia recommendations. (ongoing 7/13/2020)     Plan:  Continued daily Dysphagia treatment with goals per plan of care. Patient/Family Education:Education given to the Pt and nurse, who verbalized understanding    Discharge Recommendations:  Pt will benefit from continued skilled Speech Therapy for Dysphagia services, prior to returning home. Timed Code Treatment:  10 min    Total Treatment Time:  25 min    If patient discharges prior to next session this note will serve as a discharge summary.      Estephania MUNOZ#[id number]   Speech-Language Pathologist

## 2020-07-13 NOTE — PROGRESS NOTES
[address]     Received order for OT/PT evaluation. Pt evaluated on 7/9/20, and, in discussion with ECF, pt was determined to be at her baseline level. Pt is dependent for ADLs, including feeding, is nonambulatory, and conrad lift used for transfers. Will discharge OT/PT order at this time. Thank you.  Maurizio Ricks, OTR/L, [id number], [place], [place], [id number]

## 2020-07-13 NOTE — PLAN OF CARE
Problem: Falls - Risk of:  Goal: Will remain free from falls  Description: Will remain free from falls  Outcome: Ongoing  Pt remains free from falls. Safety precautions in place. Bed in lowest position, bed wheels locked, call light with in reach, bed alarm on, yellow blanket in place, fall risk wrist band on, orange light or SAFE sign outside of doorway. Will continue to monitor.   Cara Lewis RN    Goal: Absence of physical injury  Description: Absence of physical injury  Outcome: Ongoing

## 2020-07-13 NOTE — PROGRESS NOTES
Office : [phone]     Fax :[phone]       Nephrology  Note          Chief Complaint:    Chief Complaint   Patient presents with    Altered Mental Status     pt brought by [facility] from [facility] with being found unresponsive by nurse; last seen normal approximately 1.5 hours ago; nurse came in and found patient like this; 35 mcg Fentanyl patch on patient and removed by EMS; given Narcan 2 mg IV by EMS; per EMS, pt has ST-elevation in v2, v3, and aVf         History of Present Ilness:    Lorraine Martínez is a 80 y.o. female with history of dementia  diabetes type 2 with hyperglycemia, gastroesophageal reflux disease, hyperlipidemia, essential hypertension, history of CVA, CKD stage III (baseline creatinine 1.2-1.3), who presents from nursing home with reports that patient was found unresponsive in bed. It is unclear how long patient had been unresponsive, suspected to have been anywhere from 1 to 1.5 hours prior to the time he was found. Paramedics were called and patient was brought to the emergency room, noted to respond to painful stimuli with eyes open in the emergency room, moving all extremities. He did not have much improvement with Narcan. Paramedics also report some abnormal ST depression on telemetry prior to arrival in the emergency room.     Patient has multiple electrolyte abnormalities on presentation to the emergency room, notably creatinine of 5.0 (baseline 1.2-1.3), sodium 150, potassium 5.4, bicarb 17, anion gap 27, leukocytosis of 26.1, troponin 0 0.34, lactic acid 3.3, proBNP 5257. Chest x-ray did not reveal any consolidation; revealed increased paratracheal opacity for which recommendation was made to rule out malignancy.     All history is gathered sulfate  (90 Base) MCG/ACT inhaler 2 puff, Q4H PRN  acetaminophen (TYLENOL) tablet 650 mg, Q6H PRN    Or  acetaminophen (TYLENOL) suppository 650 mg, Q6H PRN  glucose (GLUTOSE) 40 % oral gel 15 g, PRN  dextrose 50 % IV solution, PRN  glucagon (rDNA) injection 1 mg, PRN  dextrose 5 % solution, PRN  sodium chloride flush 0.9 % injection 10 mL, 2 times per day  sodium chloride flush 0.9 % injection 10 mL, PRN  acetaminophen (TYLENOL) tablet 650 mg, Q6H PRN    Or  acetaminophen (TYLENOL) suppository 650 mg, Q6H PRN  polyethylene glycol (GLYCOLAX) packet 17 g, Daily PRN  promethazine (PHENERGAN) tablet 12.5 mg, Q6H PRN    Or  ondansetron (ZOFRAN) injection 4 mg, Q6H PRN  0.9 % sodium chloride bolus, Once    Followed by  0.9 % sodium chloride infusion, Continuous  insulin regular (HUMULIN R;NOVOLIN R) injection 8 Units, Once  lidocaine PF 1 % injection 5 mL, Once  lidocaine PF 1 % injection 5 mL, Once  sodium chloride flush 0.9 % injection 10 mL, 2 times per day  sodium chloride flush 0.9 % injection 10 mL, PRN  aspirin chewable tablet 324 mg, Once      Physical exam:     Vitals:  BP (!) 153/60   Pulse 69   Temp 96.9 °F (36.1 °C) (Temporal)   Resp (!) 32   Ht 5' 5\" (1.651 m)   Wt 165 lb 3.2 oz (74.9 kg)   SpO2 95%   BMI 27.49 kg/m²   Constitutional:  Spontaneous eye opening , responding to simple commands   Skin: no rash, turgor - oral mucosa dry   Heent:  eomi, mmm  Neck: no bruits or jvd noted  Cardiovascular:  S1, S2 without m/r/g  Respiratory: CTA B without w/r/r  Abdomen:  +bs, soft, nt, nd  Ext: no  lower extremity edema    Labs:  CBC:   Recent Labs     07/10/20  1750 07/11/20  0336  07/12/20  0145 07/12/20  1030 07/12/20  2200   WBC 13.4* 12.4*  --  13.2*  --   --    HGB 7.4* 7.0*   < > 7.4*  7.3* 7.9* 7.4*    194  --  193  --   --     < > = values in this interval not displayed.      BMP:    Recent Labs     07/10/20  2025 07/11/20  0336 07/11/20  0939 07/11/20  1706    142 142  --    K normal vascular anatomy. However,   lymphadenopathy or mass lesions in these locations are less likely diagnostic   considerations, and recommend further characterization with a dedicated chest   CT with contrast.             I/O last 3 completed shifts: In: 210 [P.O.:210]  Out: 9984 [Urine:1450]      Assessment/Plan :      1. CYNTHIA. pre renal   Hold losartan, metformin   CYNTHIA improving slowly   UOP is improving   Recommend to dose adjust all medications  based on renal functions  Maintain SBP> 90 mmHg   Daily weights   AVOID NSAIDs  Avoid Nephrotoxins  Monitor Intake/Output      2. HTN. Controlled BP     3. High anion gap acidosis 2/2 CYNTHIA   Improved   Hold metformin     4. AMS 2/2 to metabolic encephalopathy   namenda , aricept , cymbalata was held    5. Hypernatremia - 2/2 hypovolemia. Improved   \    6.  Hypokalemia - supplement po                   Thank you for allowing us to participate in care of Fer Romeo         Electronically signed by: Radha Layne MD, 7/13/2020, 9:02 AM      [facility] [address]  Office : [phone]  Fax :[phone]

## 2020-07-13 NOTE — CARE COORDINATION
Discharge planning note:    Patient can return to [facility] at discharge. Currently not eating well but she is receiving Speech Therapy and is on Dysphagia II Minced and Moist with NECTAR thick liquids.     Eduardo Alexander RN BSN  Case Management  [phone]

## 2020-07-13 NOTE — PROGRESS NOTES
Assessment completed, see doc flowsheets. Pt is alert, awake and orient to self. Lung sounds are diminished. Tele on, rhythm NSR.  VSS. Medications given per MAR. Call light with in reach, will continue to monitor.

## 2020-07-13 NOTE — PLAN OF CARE
Nutrition Problem #1: Inadequate oral intake  Intervention: Food and/or Nutrient Delivery: Continue Current Diet, Start Oral Nutrition Supplement  Nutritional Goals: Pt will consume at least 50% of meals and supplements

## 2020-07-13 NOTE — PROGRESS NOTES
[facility]    7/13/2020 10:14 AM        Name: Margie Jang . Admitted: 7/5/2020  Primary Care Provider: Mo Jenkins MD (Tel: [phone])                        Hospital course:   80 y. o. female with history of dementia (without behavioral disturbance), diabetes type 2 with hyperglycemia, gastroesophageal reflux disease,PUD, colon ploys,  hyperlipidemia, essential hypertension, history of CVA, CKD stage III (baseline creatinine 1.2-1.3), who presents from nursing home with reports that patient was found unresponsive in bed.  It is unclear how long patient had been unresponsive, suspected to have been anywhere from 1 to 1.5 hours prior to the time he was found.  Paramedics were called and patient was brought to the emergency room.  The patient on presentation was acute encephalopathy due to metabolic problems such as hypernatremia and acute kidney injury.  And also developed increased beta hydroxybutyrate and hyperglycemia and developed DKA and transferred to ICU on 7/6/2020, started insulin drip.  Anion gap closed.  Insulin drip stopped on 7/7/2020      Subjective: The patient resting in the bed  No acute events overnight. Resting well. Pain control. Diet ok. Labs reviewed  Denies any chest pain sob.      Reviewed interval ancillary notes    Current Medications  insulin lispro (1 Unit Dial) 0-12 Units, 4x Daily AC & HS  dextrose 5 % 1,000 mL infusion, Continuous  ciprofloxacin (CIPRO) tablet 500 mg, Daily  lactobacillus (CULTURELLE) capsule 1 capsule, BID WC  pantoprazole (PROTONIX) injection 40 mg, BID  aspirin EC tablet 81 mg, Daily  atorvastatin (LIPITOR) tablet 40 mg, Daily  bisacodyl (DULCOLAX) suppository 10 mg, Daily PRN  Vitamin D (CHOLECALCIFEROL) tablet 1,000 Units, Daily  [Held by provider] donepezil (ARICEPT) tablet 10 mg, Nightly  [Held by provider] DULoxetine (CYMBALTA) extended release capsule 30 mg, Daily  metoprolol tartrate (LOPRESSOR) tablet 25 mg, BID  calcium elemental (OSCAL) tablet 500 mg, Daily  vitamin B-12 (CYANOCOBALAMIN) tablet 50 mcg, Daily  albuterol sulfate  (90 Base) MCG/ACT inhaler 2 puff, Q4H PRN  acetaminophen (TYLENOL) tablet 650 mg, Q6H PRN    Or  acetaminophen (TYLENOL) suppository 650 mg, Q6H PRN  glucose (GLUTOSE) 40 % oral gel 15 g, PRN  dextrose 50 % IV solution, PRN  glucagon (rDNA) injection 1 mg, PRN  dextrose 5 % solution, PRN  sodium chloride flush 0.9 % injection 10 mL, 2 times per day  sodium chloride flush 0.9 % injection 10 mL, PRN  acetaminophen (TYLENOL) tablet 650 mg, Q6H PRN    Or  acetaminophen (TYLENOL) suppository 650 mg, Q6H PRN  polyethylene glycol (GLYCOLAX) packet 17 g, Daily PRN  promethazine (PHENERGAN) tablet 12.5 mg, Q6H PRN    Or  ondansetron (ZOFRAN) injection 4 mg, Q6H PRN  0.9 % sodium chloride bolus, Once    Followed by  0.9 % sodium chloride infusion, Continuous  insulin regular (HUMULIN R;NOVOLIN R) injection 8 Units, Once  lidocaine PF 1 % injection 5 mL, Once  lidocaine PF 1 % injection 5 mL, Once  sodium chloride flush 0.9 % injection 10 mL, 2 times per day  sodium chloride flush 0.9 % injection 10 mL, PRN  aspirin chewable tablet 324 mg, Once        Objective:  BP (!) 144/65   Pulse 69   Temp 96.8 °F (36 °C) (Temporal)   Resp (!) 32   Ht 5' 5\" (1.651 m)   Wt 165 lb 3.2 oz (74.9 kg)   SpO2 94%   BMI 27.49 kg/m²     Intake/Output Summary (Last 24 hours) at 7/13/2020 1014  Last data filed at 7/13/2020 2676  Gross per 24 hour   Intake 100 ml   Output 1450 ml   Net -1350 ml      Wt Readings from Last 3 Encounters:   07/13/20 165 lb 3.2 oz (74.9 kg)   07/13/18 170 lb 14.4 oz (77.5 kg)   02/28/18 163 lb (73.9 kg)          General appearance: Elderly lady, -American, obese appears comfortable  Eyes: Sclera clear. Pupils equal.  ENT: Moist oral mucosa.  Trachea midline, no patient [facility], eventually she can go back to nursing home. Diet: DIET CARB CONTROL; Mildly Thick (Nectar);  No Drinking Straw  Code:[id number]-CC  DVT PPX shanita Boogie MD   7/13/2020 10:14 AM

## 2020-07-13 NOTE — PROGRESS NOTES
[facility]  [facility] for [facility] test for screening to go to [facility]. Awaiting response.       Alford Kayser

## 2020-07-13 NOTE — PROGRESS NOTES
Comprehensive Nutrition Assessment    Type and Reason for Visit:  Initial(LOS assessment)    Nutrition Recommendations/Plan:   1. Trial Boost pudding BID  2. Encourage PO intake  3. Document all PO intake in flow sheets     Nutrition Assessment:  Pt is nutritionally compromised as evidenced by PO intake less than 50% of meals on 4 CCC diet with mildly (nectar) thick liquids x 7 days during admission. Note pt has said she doesn't feel like eating and just wants to rest. Will offer Boost pudding BID. Malnutrition Assessment:  Malnutrition Status: At risk for malnutrition (Comment)    Context:  Acute Illness       Estimated Daily Nutrient Needs:  Energy (kcal):  9621-5858; Weight Used for Energy Requirements:  Current(75 kg)     Protein (g):  74-91 grams; Weight Used for Protein Requirements:  Ideal(57 kg; 1.3-1.6 grams per kg)        Fluid (ml/day):  1 mL per kcal; Weight Used for Fluid Requirements:  Current      Nutrition Related Findings:  Oriented to person. +2 pitting BLE edema. +7.7 liters. Wounds:  None       Current Nutrition Therapies:    DIET CARB CONTROL; Mildly Thick (Nectar); No Drinking Straw    Anthropometric Measures:  · Height: 5' 5\" (165.1 cm)  · Current Body Weight: 165 lb (74.8 kg)   · Admission Body Weight: 170 lb (77.1 kg)      · Ideal Body Weight: 125 lbs  · BMI: 27.5  · BMI Categories: Overweight (BMI 25.0-29. 9)       Nutrition Diagnosis:   · Inadequate oral intake related to inadequate protein-energy intake as evidenced by intake 0-25%, intake 26-50%    Nutrition Interventions:   Food and/or Nutrient Delivery:  Continue Current Diet, Start Oral Nutrition Supplement  Nutrition Education/Counseling:  Education not indicated   Coordination of Nutrition Care:  Continued Inpatient Monitoring    Goals:  Pt will consume at least 50% of meals and supplements       Nutrition Monitoring and Evaluation:   Behavioral-Environmental Outcomes:      Food/Nutrient Intake Outcomes:  Food and Nutrient Intake, Supplement Intake  Physical Signs/Symptoms Outcomes:        Discharge Planning:     Too soon to determine     Electronically signed by Jero Paul RD, LD on 7/13/20 at 10:54 AM EDT    Contact: [phone]

## 2020-07-13 NOTE — PROGRESS NOTES
[facility]   Progress Note      SUBJECTIVE:      Events noted. Patient feels better. No fever. OBJECTIVE    Physical  VITALS:  BP (!) 141/49   Pulse 61   Temp 96.8 °F (36 °C) (Temporal)   Resp 12   Ht 5' 5\" (1.651 m)   Wt 165 lb 3.2 oz (74.9 kg)   SpO2 93%   BMI 27.49 kg/m²   TEMPERATURE:  Current - Temp: 96.8 °F (36 °C); Max - Temp  Av.9 °F (36.1 °C)  Min: 96.6 °F (35.9 °C)  Max: 97.8 °F (36.6 °C)  BLOOD PRESSURE RANGE:  Systolic (01CNB), MKT:165 , Min:133 , VMA:886   ; Diastolic (25XLX), OZL:61, Min:49, Max:70    24HR INTAKE/OUTPUT:      Intake/Output Summary (Last 24 hours) at 2020 1257  Last data filed at 2020 0952  Gross per 24 hour   Intake 50 ml   Output 1450 ml   Net -1400 ml     Conscious alert and oriented. Respiratory efforts normal  Abdomen not distended  No edema  No focal deficits.     Data  Labs:  General Labs:  CBC with Differential:    Lab Results   Component Value Date    WBC 13.2 2020    RBC 2.43 2020    HGB 7.4 2020    HCT 22.9 2020     2020    MCV 91.5 2020    MCH 30.6 2020    MCHC 33.4 2020    RDW 17.0 2020    BANDSPCT 3 2020    LYMPHOPCT 16.0 2020    MONOPCT 6.0 2020    BASOPCT 0.0 2020    MONOSABS 0.8 2020    LYMPHSABS 2.1 2020    EOSABS 0.5 2020    BASOSABS 0.0 2020     BMP:    Lab Results   Component Value Date     2020    K 3.4 2020    K 5.4 2020     2020    CO2 17 2020    BUN 60 2020    LABALBU 2.2 2020    CREATININE 2.8 2020    CALCIUM 8.6 2020    GFRAA 19 2020    LABGLOM 16 2020    GLUCOSE 165 2020     Hepatic Function Panel:    Lab Results   Component Value Date    ALKPHOS 122 2020    ALT 18 2020    AST 46 2020    PROT 5.5 2020    BILITOT 0.3 2020    BILIDIR <0.2 2020    IBILI see below 2020    LABALBU 2.2 07/09/2020     LDH:    Lab Results   Component Value Date     07/09/2020     PT/INR:    Lab Results   Component Value Date    PROTIME 13.3 07/05/2020    INR 1.14 07/05/2020     PTT:    Lab Results   Component Value Date    APTT 32.9 07/13/2018   [APTT    Current Medications  Current Facility-Administered Medications: insulin lispro (1 Unit Dial) 0-12 Units, 0-12 Units, Subcutaneous, 4x Daily AC & HS  dextrose 5 % 1,000 mL infusion, , Intravenous, Continuous  ciprofloxacin (CIPRO) tablet 500 mg, 500 mg, Oral, Daily  lactobacillus (CULTURELLE) capsule 1 capsule, 1 capsule, Oral, BID WC  pantoprazole (PROTONIX) injection 40 mg, 40 mg, Intravenous, BID  aspirin EC tablet 81 mg, 81 mg, Oral, Daily  atorvastatin (LIPITOR) tablet 40 mg, 40 mg, Oral, Daily  bisacodyl (DULCOLAX) suppository 10 mg, 10 mg, Rectal, Daily PRN  Vitamin D (CHOLECALCIFEROL) tablet 1,000 Units, 1,000 Units, Oral, Daily  [Held by provider] donepezil (ARICEPT) tablet 10 mg, 10 mg, Oral, Nightly  [Held by provider] DULoxetine (CYMBALTA) extended release capsule 30 mg, 30 mg, Oral, Daily  metoprolol tartrate (LOPRESSOR) tablet 25 mg, 25 mg, Oral, BID  calcium elemental (OSCAL) tablet 500 mg, 500 mg, Oral, Daily  vitamin B-12 (CYANOCOBALAMIN) tablet 50 mcg, 50 mcg, Oral, Daily  albuterol sulfate  (90 Base) MCG/ACT inhaler 2 puff, 2 puff, Inhalation, Q4H PRN  acetaminophen (TYLENOL) tablet 650 mg, 650 mg, Oral, Q6H PRN **OR** acetaminophen (TYLENOL) suppository 650 mg, 650 mg, Rectal, Q6H PRN  glucose (GLUTOSE) 40 % oral gel 15 g, 15 g, Oral, PRN  dextrose 50 % IV solution, 12.5 g, Intravenous, PRN  glucagon (rDNA) injection 1 mg, 1 mg, Intramuscular, PRN  dextrose 5 % solution, 100 mL/hr, Intravenous, PRN  sodium chloride flush 0.9 % injection 10 mL, 10 mL, Intravenous, 2 times per day  sodium chloride flush 0.9 % injection 10 mL, 10 mL, Intravenous, PRN  acetaminophen (TYLENOL) tablet 650 mg, 650 mg, Oral, Q6H PRN **OR** acetaminophen

## 2020-07-14 LAB
ANION GAP SERPL CALCULATED.3IONS-SCNC: 11 MMOL/L (ref 3–16)
BASOPHILS ABSOLUTE: 0 K/UL (ref 0–0.2)
BASOPHILS RELATIVE PERCENT: 0.2 %
BUN BLDV-MCNC: 31 MG/DL (ref 7–20)
CALCIUM SERPL-MCNC: 8.5 MG/DL (ref 8.3–10.6)
CHLORIDE BLD-SCNC: 115 MMOL/L (ref 99–110)
CO2: 15 MMOL/L (ref 21–32)
CREAT SERPL-MCNC: 2.4 MG/DL (ref 0.6–1.2)
EOSINOPHILS ABSOLUTE: 0.2 K/UL (ref 0–0.6)
EOSINOPHILS RELATIVE PERCENT: 1.8 %
GFR AFRICAN AMERICAN: 23
GFR NON-AFRICAN AMERICAN: 19
GLUCOSE BLD-MCNC: 116 MG/DL (ref 70–99)
GLUCOSE BLD-MCNC: 156 MG/DL (ref 70–99)
GLUCOSE BLD-MCNC: 162 MG/DL (ref 70–99)
GLUCOSE BLD-MCNC: 86 MG/DL (ref 70–99)
GLUCOSE BLD-MCNC: 99 MG/DL (ref 70–99)
HCT VFR BLD CALC: 23.9 % (ref 36–48)
HCT VFR BLD CALC: 23.9 % (ref 36–48)
HCT VFR BLD CALC: 27.3 % (ref 36–48)
HEMOGLOBIN: 7.8 G/DL (ref 12–16)
HEMOGLOBIN: 7.8 G/DL (ref 12–16)
HEMOGLOBIN: 8.4 G/DL (ref 12–16)
LYMPHOCYTES ABSOLUTE: 1 K/UL (ref 1–5.1)
LYMPHOCYTES RELATIVE PERCENT: 7.6 %
MCH RBC QN AUTO: 29.9 PG (ref 26–34)
MCHC RBC AUTO-ENTMCNC: 32.6 G/DL (ref 31–36)
MCV RBC AUTO: 91.5 FL (ref 80–100)
MONOCYTES ABSOLUTE: 0.5 K/UL (ref 0–1.3)
MONOCYTES RELATIVE PERCENT: 4 %
NEUTROPHILS ABSOLUTE: 11.1 K/UL (ref 1.7–7.7)
NEUTROPHILS RELATIVE PERCENT: 86.4 %
PDW BLD-RTO: 17.7 % (ref 12.4–15.4)
PERFORMED ON: ABNORMAL
PERFORMED ON: NORMAL
PLATELET # BLD: 215 K/UL (ref 135–450)
PMV BLD AUTO: 9.1 FL (ref 5–10.5)
POTASSIUM SERPL-SCNC: 3.5 MMOL/L (ref 3.5–5.1)
RBC # BLD: 2.61 M/UL (ref 4–5.2)
SODIUM BLD-SCNC: 141 MMOL/L (ref 136–145)
WBC # BLD: 12.9 K/UL (ref 4–11)

## 2020-07-14 PROCEDURE — 6360000002 HC RX W HCPCS: Performed by: PHYSICIAN ASSISTANT

## 2020-07-14 PROCEDURE — 1200000000 HC SEMI PRIVATE

## 2020-07-14 PROCEDURE — 85014 HEMATOCRIT: CPT

## 2020-07-14 PROCEDURE — 97129 THER IVNTJ 1ST 15 MIN: CPT

## 2020-07-14 PROCEDURE — 6370000000 HC RX 637 (ALT 250 FOR IP): Performed by: INTERNAL MEDICINE

## 2020-07-14 PROCEDURE — 92526 ORAL FUNCTION THERAPY: CPT

## 2020-07-14 PROCEDURE — 85018 HEMOGLOBIN: CPT

## 2020-07-14 PROCEDURE — 85025 COMPLETE CBC W/AUTO DIFF WBC: CPT

## 2020-07-14 PROCEDURE — 2580000003 HC RX 258: Performed by: INTERNAL MEDICINE

## 2020-07-14 PROCEDURE — 36415 COLL VENOUS BLD VENIPUNCTURE: CPT

## 2020-07-14 PROCEDURE — C9113 INJ PANTOPRAZOLE SODIUM, VIA: HCPCS | Performed by: PHYSICIAN ASSISTANT

## 2020-07-14 PROCEDURE — 2580000003 HC RX 258: Performed by: HOSPITALIST

## 2020-07-14 PROCEDURE — 80048 BASIC METABOLIC PNL TOTAL CA: CPT

## 2020-07-14 PROCEDURE — U0003 INFECTIOUS AGENT DETECTION BY NUCLEIC ACID (DNA OR RNA); SEVERE ACUTE RESPIRATORY SYNDROME CORONAVIRUS 2 (SARS-COV-2) (CORONAVIRUS DISEASE [COVID-19]), AMPLIFIED PROBE TECHNIQUE, MAKING USE OF HIGH THROUGHPUT TECHNOLOGIES AS DESCRIBED BY CMS-2020-01-R: HCPCS

## 2020-07-14 RX ORDER — CIPROFLOXACIN 500 MG/1
500 TABLET, FILM COATED ORAL
Qty: 3 TABLET | Refills: 0 | Status: SHIPPED | OUTPATIENT
Start: 2020-07-15 | End: 2020-07-18

## 2020-07-14 RX ORDER — INSULIN LISPRO 100 [IU]/ML
0-12 INJECTION, SOLUTION INTRAVENOUS; SUBCUTANEOUS
Qty: 1 PEN | Refills: 1 | Status: SHIPPED | OUTPATIENT
Start: 2020-07-14

## 2020-07-14 RX ORDER — PANTOPRAZOLE SODIUM 40 MG/1
TABLET, DELAYED RELEASE ORAL
Qty: 180 TABLET | Refills: 1 | Status: SHIPPED | OUTPATIENT
Start: 2020-07-14

## 2020-07-14 RX ORDER — LACTOBACILLUS RHAMNOSUS GG 10B CELL
1 CAPSULE ORAL 2 TIMES DAILY WITH MEALS
Qty: 30 CAPSULE | Refills: 0 | Status: SHIPPED | OUTPATIENT
Start: 2020-07-14

## 2020-07-14 RX ADMIN — Medication 1 CAPSULE: at 09:34

## 2020-07-14 RX ADMIN — Medication 50 MCG: at 09:34

## 2020-07-14 RX ADMIN — Medication 10 ML: at 22:16

## 2020-07-14 RX ADMIN — VITAMIN D, TAB 1000IU (100/BT) 1000 UNITS: 25 TAB at 09:25

## 2020-07-14 RX ADMIN — Medication 1 CAPSULE: at 17:01

## 2020-07-14 RX ADMIN — DESMOPRESSIN ACETATE 40 MG: 0.2 TABLET ORAL at 09:24

## 2020-07-14 RX ADMIN — INSULIN LISPRO 2 UNITS: 100 INJECTION, SOLUTION INTRAVENOUS; SUBCUTANEOUS at 22:09

## 2020-07-14 RX ADMIN — PANTOPRAZOLE SODIUM 40 MG: 40 INJECTION, POWDER, FOR SOLUTION INTRAVENOUS at 22:16

## 2020-07-14 RX ADMIN — Medication 10 ML: at 09:25

## 2020-07-14 RX ADMIN — PANTOPRAZOLE SODIUM 40 MG: 40 INJECTION, POWDER, FOR SOLUTION INTRAVENOUS at 09:25

## 2020-07-14 RX ADMIN — METOPROLOL TARTRATE 25 MG: 25 TABLET, FILM COATED ORAL at 09:25

## 2020-07-14 RX ADMIN — CALCIUM 500 MG: 500 TABLET ORAL at 09:24

## 2020-07-14 RX ADMIN — METOPROLOL TARTRATE 25 MG: 25 TABLET, FILM COATED ORAL at 22:16

## 2020-07-14 RX ADMIN — ASPIRIN 81 MG: 81 TABLET, COATED ORAL at 09:24

## 2020-07-14 RX ADMIN — CIPROFLOXACIN HYDROCHLORIDE 500 MG: 500 TABLET, FILM COATED ORAL at 09:24

## 2020-07-14 ASSESSMENT — PAIN SCALES - GENERAL
PAINLEVEL_OUTOF10: 0

## 2020-07-14 NOTE — PLAN OF CARE
Problem: Falls - Risk of:  Goal: Will remain free from falls  Description: Will remain free from falls  Outcome: Ongoing  Goal: Absence of physical injury  Description: Absence of physical injury  Outcome: Ongoing     Problem: Skin Integrity:  Goal: Will show no infection signs and symptoms  Description: Will show no infection signs and symptoms  Outcome: Ongoing  Goal: Absence of new skin breakdown  Description: Absence of new skin breakdown  Outcome: Ongoing     Problem: Confusion - Acute:  Goal: Absence of continued neurological deterioration signs and symptoms  Description: Absence of continued neurological deterioration signs and symptoms  Outcome: Ongoing  Goal: Mental status will be restored to baseline  Description: Mental status will be restored to baseline  Outcome: Ongoing     Problem: Discharge Planning:  Goal: Ability to perform activities of daily living will improve  Description: Ability to perform activities of daily living will improve  Outcome: Ongoing  Goal: Participates in care planning  Description: Participates in care planning  Outcome: Ongoing  Goal: Discharged to appropriate level of care  Description: Discharged to appropriate level of care  Outcome: Ongoing     Problem: Injury - Risk of, Physical Injury:  Goal: Will remain free from falls  Description: Will remain free from falls  Outcome: Ongoing  Goal: Absence of physical injury  Description: Absence of physical injury  Outcome: Ongoing     Problem: Mood - Altered:  Goal: Mood stable  Description: Mood stable  Outcome: Ongoing  Goal: Absence of abusive behavior  Description: Absence of abusive behavior  Outcome: Ongoing  Goal: Verbalizations of feeling emotionally comfortable while being cared for will increase  Description: Verbalizations of feeling emotionally comfortable while being cared for will increase  Outcome: Ongoing     Problem: Psychomotor Activity - Altered:  Goal: Absence of psychomotor disturbance signs and intake and output will improve  Description: Ability to achieve a balanced intake and output will improve  Outcome: Ongoing

## 2020-07-14 NOTE — PLAN OF CARE
Problem: Falls - Risk of:  Goal: Will remain free from falls  Description: Will remain free from falls  7/14/2020 1531 by Rock Zhou RN  Outcome: Completed  7/14/2020 1507 by Rock Zhou RN  Outcome: Ongoing  Goal: Absence of physical injury  Description: Absence of physical injury  7/14/2020 1531 by Rock Zhou RN  Outcome: Completed  7/14/2020 1507 by Rock Zhou RN  Outcome: Ongoing     Problem: Skin Integrity:  Goal: Will show no infection signs and symptoms  Description: Will show no infection signs and symptoms  7/14/2020 1531 by Rock Zhou RN  Outcome: Completed  7/14/2020 1507 by Rock Zhou RN  Outcome: Ongoing  Goal: Absence of new skin breakdown  Description: Absence of new skin breakdown  7/14/2020 1531 by Rock Zhou RN  Outcome: Completed  7/14/2020 1507 by Rock Zhou RN  Outcome: Ongoing     Problem: Confusion - Acute:  Goal: Absence of continued neurological deterioration signs and symptoms  Description: Absence of continued neurological deterioration signs and symptoms  7/14/2020 1531 by Rock Zhou RN  Outcome: Completed  7/14/2020 1507 by Rock Zhou RN  Outcome: Ongoing  Goal: Mental status will be restored to baseline  Description: Mental status will be restored to baseline  7/14/2020 1531 by Rock Zhou RN  Outcome: Completed  7/14/2020 1507 by Rock Zhou RN  Outcome: Ongoing     Problem: Discharge Planning:  Goal: Ability to perform activities of daily living will improve  Description: Ability to perform activities of daily living will improve  7/14/2020 1531 by Rock Zhou RN  Outcome: Completed  7/14/2020 1507 by Rock Zhou RN  Outcome: Ongoing  Goal: Participates in care planning  Description: Participates in care planning  7/14/2020 1531 by Rock Zhou RN  Outcome: Completed  7/14/2020 1507 by Rock Zhou RN  Outcome: Ongoing  Goal: Discharged to appropriate level of care  Description: Discharged to misperception frequency  7/14/2020 1531 by Guicho Sandoval RN  Outcome: Completed  7/14/2020 1507 by Guicho Sandoval RN  Outcome: Ongoing  Goal: Able to refrain from responding to false sensory perceptions  Description: Able to refrain from responding to false sensory perceptions  7/14/2020 1531 by Guicho Sandoval RN  Outcome: Completed  7/14/2020 1507 by Guicho Sandoval RN  Outcome: Ongoing  Goal: Demonstrates accurate environmental perceptions  Description: Demonstrates accurate environmental perceptions  7/14/2020 1531 by Guicho Sandoval RN  Outcome: Completed  7/14/2020 1507 by Guicho Sandoval RN  Outcome: Ongoing  Goal: Able to distinguish between reality-based and nonreality-based thinking  Description: Able to distinguish between reality-based and nonreality-based thinking  7/14/2020 1531 by Guicho Sandoval RN  Outcome: Completed  7/14/2020 1507 by Guicho Sandoval RN  Outcome: Ongoing  Goal: Able to interrupt nonreality-based thinking  Description: Able to interrupt nonreality-based thinking  7/14/2020 1531 by Guicho Sandoval RN  Outcome: Completed  7/14/2020 1507 by Guicho Sandoval RN  Outcome: Ongoing     Problem: Sleep Pattern Disturbance:  Goal: Appears well-rested  Description: Appears well-rested  7/14/2020 1531 by Guicho Sandoval RN  Outcome: Completed  7/14/2020 1507 by Guicho Sandoval RN  Outcome: Ongoing     Problem: Nutrition  Goal: Optimal nutrition therapy  7/14/2020 1531 by Guicho Sandoval RN  Outcome: Completed  7/14/2020 1507 by Guicho Sandoval RN  Outcome: Ongoing     Problem: Serum Glucose Level - Abnormal:  Goal: Ability to maintain appropriate glucose levels will improve  Description: Ability to maintain appropriate glucose levels will improve  7/14/2020 1531 by Guicho Sandoval RN  Outcome: Completed  7/14/2020 1507 by Guicho Sandoval RN  Outcome: Ongoing     Problem: Tissue Perfusion - Renal, Altered:  Goal: Electrolytes within specified parameters  Description:

## 2020-07-14 NOTE — CARE COORDINATION
Discharge planning note:    Discharge cancelled for today. Facility requiring a new COVID test.  Can admit to [facility] when Neg COVID test completed.

## 2020-07-14 NOTE — PROGRESS NOTES
(CYANOCOBALAMIN) tablet 50 mcg, Daily  albuterol sulfate  (90 Base) MCG/ACT inhaler 2 puff, Q4H PRN  acetaminophen (TYLENOL) tablet 650 mg, Q6H PRN    Or  acetaminophen (TYLENOL) suppository 650 mg, Q6H PRN  glucose (GLUTOSE) 40 % oral gel 15 g, PRN  dextrose 50 % IV solution, PRN  glucagon (rDNA) injection 1 mg, PRN  dextrose 5 % solution, PRN  sodium chloride flush 0.9 % injection 10 mL, 2 times per day  sodium chloride flush 0.9 % injection 10 mL, PRN  polyethylene glycol (GLYCOLAX) packet 17 g, Daily PRN  promethazine (PHENERGAN) tablet 12.5 mg, Q6H PRN    Or  ondansetron (ZOFRAN) injection 4 mg, Q6H PRN  0.9 % sodium chloride bolus, Once    Followed by  0.9 % sodium chloride infusion, Continuous  lidocaine PF 1 % injection 5 mL, Once  lidocaine PF 1 % injection 5 mL, Once  sodium chloride flush 0.9 % injection 10 mL, 2 times per day  sodium chloride flush 0.9 % injection 10 mL, PRN  aspirin chewable tablet 324 mg, Once        Objective:  BP (!) 144/54   Pulse 85   Temp 97.2 °F (36.2 °C) (Temporal)   Resp 15   Ht 5' 5\" (1.651 m)   Wt 164 lb 4.8 oz (74.5 kg)   SpO2 99%   BMI 27.34 kg/m²     Intake/Output Summary (Last 24 hours) at 7/14/2020 1835  Last data filed at 7/14/2020 1654  Gross per 24 hour   Intake --   Output 525 ml   Net -525 ml      Wt Readings from Last 3 Encounters:   07/14/20 164 lb 4.8 oz (74.5 kg)   07/13/18 170 lb 14.4 oz (77.5 kg)   02/28/18 163 lb (73.9 kg)          General appearance: Elderly lady, -American, obese appears comfortable  Eyes: Sclera clear. Pupils equal.  ENT: Moist oral mucosa. Trachea midline, no adenopathy. Cardiovascular: Regular rhythm, normal S1, S2. No murmur. No edema in lower extremities  Respiratory: Not using accessory muscles. Good inspiratory effort. Clear to auscultation bilaterally, no wheeze or crackles.    GI: Abdomen soft, no tenderness, not distended, normal bowel sounds  Musculoskeletal: No cyanosis in digits, neck supple  Neurology: CN 2-12 grossly intact. No speech or motor deficits  Psych: Normal affect. Alert and oriented in time, place and person  Skin: Warm, dry, normal turgor    Labs and Tests:  CBC:   Recent Labs     07/13/20  1606 07/13/20  2118 07/14/20  0238 07/14/20  0239 07/14/20  1055   WBC 13.6* 13.2* 12.9*  --   --    HGB 7.6* 8.3* 7.8* 7.8* 8.4*   HCT 23.8* 26.2* 23.9* 23.9* 27.3*   MCV 90.4 90.8 91.5  --   --     219 215  --   --      BMP:    Recent Labs     07/13/20  1606 07/14/20  0458    141   K 3.4* 3.5    115*   CO2 18* 15*   BUN 34* 31*   CREATININE 2.3* 2.4*   GLUCOSE 113* 99         Problem List  Active Problems:    Acute kidney injury (HCC)    DKA, type 1, not at goal [facility])    Suspected COVID-19 virus infection    Severe sepsis ([facility] [address]    Metabolic encephalopathy    Hypernatremia    Nursing home resident    History of arterial ischemic stroke    Essential hypertension    Mixed hyperlipidemia    History of depression    Diarrhea    Overweight (BMI 25.0-29. 9)    Encounter for medication counseling    Weight loss counseling, encounter for  Resolved Problems:    * No resolved hospital problems. *     Assessment & Plan:      1. Acute metabolic encephalopathy. At her baseline cognition level now, she has baseline dementia as well  2. Acute kidney injury on CKD stage III.  Baseline creatinine 1.2-1.3; creatinine 4.2-->3.7-->2.9-->2.8-->2.3-->2.4 today. Probably her new baseline. Nephrology service following.  Nephrology okayed for discharge and follow-up. 3.E. coli UTI significant pyuria noted, significant leukocytosis noted, E. coli growing in the urine culture, antibiotic treatment switched to ciprofloxacin by mouth on 7/10 , C. difficile toxin come back negative. Plan to continue antibiotic until 717  4. Sepsis as manifested by tachycardia,   leukocytosis and lactic acidosis: Secondary to E. coli UTI. Resolved. 5.Acute respiratory failure with hypoxia: COVID-19 negative .  Resolved.   6.Elevated troponin of

## 2020-07-14 NOTE — PROGRESS NOTES
Office : [phone]     Fax :[phone]       Nephrology  Note          Chief Complaint:    Chief Complaint   Patient presents with    Altered Mental Status     pt brought by [facility] from [facility] with being found unresponsive by nurse; last seen normal approximately 1.5 hours ago; nurse came in and found patient like this; 35 mcg Fentanyl patch on patient and removed by EMS; given Narcan 2 mg IV by EMS; per EMS, pt has ST-elevation in v2, v3, and aVf         History of Present Ilness:    Orbie Favre is a 80 y.o. female with history of dementia  diabetes type 2 with hyperglycemia, gastroesophageal reflux disease, hyperlipidemia, essential hypertension, history of CVA, CKD stage III (baseline creatinine 1.2-1.3), who presents from nursing home with reports that patient was found unresponsive in bed. It is unclear how long patient had been unresponsive, suspected to have been anywhere from 1 to 1.5 hours prior to the time he was found. Paramedics were called and patient was brought to the emergency room, noted to respond to painful stimuli with eyes open in the emergency room, moving all extremities. He did not have much improvement with Narcan. Paramedics also report some abnormal ST depression on telemetry prior to arrival in the emergency room.     Patient has multiple electrolyte abnormalities on presentation to the emergency room, notably creatinine of 5.0 (baseline 1.2-1.3), sodium 150, potassium 5.4, bicarb 17, anion gap 27, leukocytosis of 26.1, troponin 0 0.34, lactic acid 3.3, proBNP 5257. Chest x-ray did not reveal any consolidation; revealed increased paratracheal opacity for which recommendation was made to rule out malignancy.     All history is gathered from medical records     Interval hx     BUN and creatinine level much better   UOP improved   Oral intake better   Sodium level better       I/O last 3 completed shifts: In: 20 [I.V.:20]  Out: 225 [Urine:225]  No intake/output data recorded.         Past Medical History:   Diagnosis Date    Arthritis     Chronic kidney disease     Constipation     D[facility]     Depression     [facility]     GERD (gastroesophageal reflux disease)     Glaucoma     Hyperlipidemia     Hypertension     [facility]     Muscle weakness (generalized)     Unspecified cerebral artery occlusion with cerebral infarction     UTI (lower urinary tract infection)        Past Surgical History:   Procedure Laterality Date    APPENDECTOMY      BREAST SURGERY      CHOLECYSTECTOMY      COLONOSCOPY      DILATATION, ESOPHAGUS      ENDOSCOPY, COLON, DIAGNOSTIC      EYE SURGERY      HYSTERECTOMY      JOINT REPLACEMENT      SIGMOIDOSCOPY  3/18/15    TONSILLECTOMY      UPPER GASTROINTESTINAL ENDOSCOPY  3/18/15    UPPER GASTROINTESTINAL ENDOSCOPY N/A 7/8/2020    EGD BIOPSY performed by Saima Andrews MD at [address] [place] [address]         Current Medications:    insulin lispro (1 Unit Dial) 0-12 Units, 4x Daily AC & HS  dextrose 5 % 1,000 mL infusion, Continuous  ciprofloxacin (CIPRO) tablet 500 mg, Daily  lactobacillus (CULTURELLE) capsule 1 capsule, BID WC  pantoprazole (PROTONIX) injection 40 mg, BID  aspirin EC tablet 81 mg, Daily  atorvastatin (LIPITOR) tablet 40 mg, Daily  bisacodyl (DULCOLAX) suppository 10 mg, Daily PRN  Vitamin D (CHOLECALCIFEROL) tablet 1,000 Units, Daily  [Held by provider] donepezil (ARICEPT) tablet 10 mg, Nightly  [Held by provider] DULoxetine (CYMBALTA) extended release capsule 30 mg, Daily  metoprolol tartrate (LOPRESSOR) tablet 25 mg, BID  calcium elemental (OSCAL) tablet 500 mg, Daily  vitamin B-12 (CYANOCOBALAMIN) tablet 50 mcg, Daily  albuterol sulfate  (90 Base) MCG/ACT inhaler 2 puff, Q4H PRN  acetaminophen (TYLENOL) tablet 650 mg, Q6H PRN    Or  acetaminophen (TYLENOL) suppository 650 mg, Q6H PRN  glucose (GLUTOSE) 40 % oral gel 15 g, PRN  dextrose 50 % IV solution, PRN  glucagon (rDNA) injection 1 mg, PRN  dextrose 5 % solution, PRN  sodium chloride flush 0.9 % injection 10 mL, 2 times per day  sodium chloride flush 0.9 % injection 10 mL, PRN  acetaminophen (TYLENOL) tablet 650 mg, Q6H PRN    Or  acetaminophen (TYLENOL) suppository 650 mg, Q6H PRN  polyethylene glycol (GLYCOLAX) packet 17 g, Daily PRN  promethazine (PHENERGAN) tablet 12.5 mg, Q6H PRN    Or  ondansetron (ZOFRAN) injection 4 mg, Q6H PRN  0.9 % sodium chloride bolus, Once    Followed by  0.9 % sodium chloride infusion, Continuous  insulin regular (HUMULIN R;NOVOLIN R) injection 8 Units, Once  lidocaine PF 1 % injection 5 mL, Once  lidocaine PF 1 % injection 5 mL, Once  sodium chloride flush 0.9 % injection 10 mL, 2 times per day  sodium chloride flush 0.9 % injection 10 mL, PRN  aspirin chewable tablet 324 mg, Once      Physical exam:     Vitals:  BP (!) 135/47   Pulse 82   Temp 96.8 °F (36 °C) (Temporal)   Resp 16   Ht 5' 5\" (1.651 m)   Wt 164 lb 4.8 oz (74.5 kg)   SpO2 100%   BMI 27.34 kg/m²   Constitutional:  Spontaneous eye opening , responding to simple commands   Skin: no rash, turgor - oral mucosa dry   Heent:  eomi, mmm  Neck: no bruits or jvd noted  Cardiovascular:  S1, S2 without m/r/g  Respiratory: CTA B without w/r/r  Abdomen:  +bs, soft, nt, nd  Ext: no  lower extremity edema    Labs:  CBC:   Recent Labs     07/13/20  1606 07/13/20  2118 07/14/20  0238 07/14/20  0239   WBC 13.6* 13.2* 12.9*  --    HGB 7.6* 8.3* 7.8* 7.8*    219 215  --      BMP:    Recent Labs     07/11/20  0939 07/11/20  1706 07/13/20  1606 07/14/20  0458     --  141 141   K 3.0* 3.4* 3.4* 3.5   *  --  110 115*   CO2 17*  --  18* 15*   BUN 60*  --  34* 31*   CREATININE 2.8*  --  2.3* 2.4*   GLUCOSE 165*  --  113* 99     Ca/Mg/Phos:   Recent Labs     07/11/20  0939 07/13/20  1606 07/14/20  0458   CALCIUM 8.6 8.3 8.5   MG  --  1.40*  --    PHOS  --  3.1  --      Hepatic:   No results for input(s): AST, ALT, ALB, BILITOT, ALKPHOS in the last 72 hours. Troponin:   No results for input(s): TROPONINI in the last 72 hours. BNP: No results for input(s): BNP in the last 72 hours. Lipids:   No results for input(s): CHOL, TRIG, HDL, LDLCALC, LABVLDL in the last 72 hours. ABGs: No results for input(s): PHART, PO2ART, DTD6YQZ in the last 72 hours. INR:   No results for input(s): INR in the last 72 hours. UA:  No results for input(s): Morteza Munir, GLUCOSEU, BILIRUBINUR, KETUA, SPECGRAV, BLOODU, PHUR, PROTEINU, UROBILINOGEN, NITRU, LEUKOCYTESUR, Kelleen Will in the last 72 hours. Urine Microscopic:   No results for input(s): LABCAST, BACTERIA, COMU, HYALCAST, WBCUA, RBCUA, EPIU in the last 72 hours. Urine Culture:   No results for input(s): LABURIN in the last 72 hours. Urine Chemistry:   No results for input(s): Ariana Willis, PROTEINUR, NAUR in the last 72 hours. IMAGING:  CT CHEST ABDOMEN PELVIS WO CONTRAST   Final Result   Addendum 1 of 1   ADDENDUM:   Also in impression: Right adrenal nodule is slightly larger as compared to   prior exam, indeterminate by CT criteria. Further evaluation could    include   adrenal protocol CT or MR in the nonacute setting. Final      XR CHEST PORTABLE   Final Result   No acute findings         US RENAL COMPLETE   Final Result   Kidneys are bilaterally small. No gross hydronephrosis. 2.2 cm complex appearing cyst at the lower pole of the right kidney. XR CHEST PORTABLE   Final Result   1. No acute airspace consolidation. 2. Increased right paratracheal opacity, and enlargement of the right hilar   shadow, both possibly representing normal vascular anatomy.   However,   lymphadenopathy or mass lesions in these locations are less likely diagnostic   considerations, and recommend further characterization with a dedicated chest   CT with contrast.             I/O last 3 completed shifts: In: 20 [I.V.:20]  Out: 225 [Urine:225]      Assessment/Plan :      1. CYNTHIA. pre renal   Hold losartan, metformin   CYNTHIA improving slowly   UOP is improving   Recommend to dose adjust all medications  based on renal functions  Maintain SBP> 90 mmHg   Daily weights   AVOID NSAIDs  Avoid Nephrotoxins  Monitor Intake/Output      2. HTN. Controlled BP     3. High anion gap acidosis 2/2 CYNTHIA   Improved   Hold metformin     4. AMS 2/2 to metabolic encephalopathy   namenda , aricept , cymbalata was held  Can be resumed . Will defer to primary attending     5. Hypernatremia - 2/2 hypovolemia. Improved   \    6.  Hypokalemia - supplement po                   Thank you for allowing us to participate in care of Elda Puentes         Electronically signed by: Mauro Cano MD, 7/14/2020, 8:57 AM      [facility] [address]  Office : [phone]  Fax :[phone]

## 2020-07-14 NOTE — PROGRESS NOTES
Shift assessment complete. Evening medications given, see MAR for details. Pt oriented to name and place only. Pt denies any pain or needs at this time. Bed alarm activated. Pt resting in bed with no signs of distress. Will continue to monitor. Call light within reach.

## 2020-07-14 NOTE — DISCHARGE SUMMARY
Hospital Medicine Discharge Summary    Patient ID: Lora Wagner      Patient's PCP: Selin Basurto MD    Admit Date: 7/5/2020     Discharge Date:   7/15/2020    Admitting Physician: Otto Mclean MD     Discharge Physician: Lianne Hong MD     Discharge Diagnoses: Active Hospital Problems    Diagnosis    Overweight (BMI 25.0-29. 9) [E66.3]    Encounter for medication counseling [Z71.89]    Weight loss counseling, encounter for [Z71.3]    Suspected COVID-19 virus infection [Z20.828]    Severe sepsis ([facility] [A41.9, U53.48]    Metabolic encephalopathy [X86.68]    Hypernatremia [E87.0]    Nursing home resident [Z59.3]    History of arterial ischemic stroke [Z86.73]    Essential hypertension [I10]    Mixed hyperlipidemia [E78.2]    History of depression [Z86.59]    Diarrhea [R19.7]    DKA, type 1, not at goal [facility]) [E10.10]    Acute kidney injury ([facility] [N17.9]       The patient was seen and examined on day of discharge and this discharge summary is in conjunction with any daily progress note from day of discharge. History of Presenting Illness: This is a pleasant 80 y.o. female with history of dementia (without behavioral disturbance), diabetes type 2 with hyperglycemia, gastroesophageal reflux disease, hyperlipidemia, essential hypertension, history of CVA, CKD stage III (baseline creatinine 1.2-1.3), who presents from nursing home with reports that patient was found unresponsive in bed. It is unclear how long patient had been unresponsive, suspected to have been anywhere from 1 to 1.5 hours prior to the time he was found. Paramedics were called and patient was brought to the emergency room, noted to respond to painful stimuli with eyes open in the emergency room, moving all extremities. He did not have much improvement with Narcan.   Paramedics also report some abnormal ST depression on telemetry prior to arrival in the emergency room.     Patient has multiple electrolyte abnormalities on presentation to the emergency room, notably creatinine of 5.0 (baseline 1.2-1.3), sodium 150, potassium 5.4, bicarb 17, anion gap 27, leukocytosis of 26.1, troponin 0 0.34, lactic acid 3.3, proBNP 5257. Chest x-ray did not reveal any consolidation; revealed increased paratracheal opacity for which recommendation was made to rule out malignancy. Hospital Course:     80 y. o. female with history of dementia (without behavioral disturbance), diabetes type 2 with hyperglycemia, gastroesophageal reflux disease,PUD, colon ploys,  hyperlipidemia, essential hypertension, history of CVA, CKD stage III (baseline creatinine 1.2-1.3), who presents from nursing home with reports that patient was found unresponsive in bed.  It is unclear how long patient had been unresponsive, suspected to have been anywhere from 1 to 1.5 hours prior to the time he was found.  Paramedics were called and patient was brought to the emergency room.  The patient on presentation was acute encephalopathy due to metabolic problems such as hypernatremia and acute kidney injury.  And also developed increased beta hydroxybutyrate and hyperglycemia and developed DKA and transferred to ICU on 7/6/2020, started insulin drip.  Anion gap closed.  Insulin drip stopped on 7/7/2020         1. Acute metabolic encephalopathy. sec to hypernatremia and CYNTHIA. At her baseline cognition level now, she has baseline dementia as well    2. Acute kidney injury on CKD stage III.  Baseline creatinine 1.2-1.3; nephro consulted. Cozaar and metformin were stopped. ivf given. creatinine 4.2-->3.7-->2.9-->2.8-->2.3 -->2. 1. improved over all    3.E. coli UTI : treated with cipro per ID. 4.Sepsis as manifested by tachycardia, leukocytosis. Sec to UTI. Resolved    5. Acute respiratory failure with hypoxia.  Suspect secondary to underlying pulmonary infectious process.   COVID-19 negative .treated with cipro as above     6.Elevated troponin of 0.34, abnormal EKG.  Could be secondary to underlying sepsis and acute kidney injury.  Downtrending, continued with aspirin, statin.  Echocardiogram showed grade 2 diastolic dysfunction       7. Hypernatremia :improved with ivf. 8.Diabetes type 2: BS continued to be on lower side due to poor po intake. Discharged with SSI only. 9. History of dementia without behavioral disturbance, continued donezepil    10. Black stools: Patient has history of remote peptic ulcer disease, multiple colonoscopies due to colon polyps,  Gastroenterology service performed upper GI endoscopy on 7/8/2020 with findings of moderate gastritis. Advised  no NSAIDs. Treated with PPI. Given  oral PPI for 8 weeks twice daily    11. Anemia: chronic. Hb around 8    12.  Physical deconditioning: PT and OT - sent back to [place] [address]. Physical Exam Performed:     BP (!) 134/51   Pulse 86   Temp 97 °F (36.1 °C) (Temporal)   Resp 15   Ht 5' 5\" (1.651 m)   Wt 164 lb 4.8 oz (74.5 kg)   SpO2 100%   BMI 27.34 kg/m²     General appearance: Elderly lady, -American, obese appears comfortable  Eyes: Sclera clear. Pupils equal.  ENT: Moist oral mucosa. Trachea midline, no adenopathy. Cardiovascular: Regular rhythm, normal S1, S2. No murmur. No edema in lower extremities  Respiratory: Not using accessory muscles. Good inspiratory effort. Clear to auscultation bilaterally, no wheeze or crackles. GI: Abdomen soft, no tenderness, not distended, normal bowel sounds  Musculoskeletal: No cyanosis in digits, neck supple  Neurology: CN 2-12 grossly intact. No speech or motor deficits  Psych: Normal affect. Alert and oriented in time, place and person  Skin: Warm, dry, normal turgor    Labs:  For convenience and continuity at follow-up the following most recent labs are provided:      CBC:    Lab Results   Component Value Date    WBC 12.9 07/14/2020    HGB 8.4 07/14/2020    HCT 27.3 07/14/2020     07/14/2020       Renal:    Lab Results   Component Value Date     07/14/2020    K 3.5 07/14/2020    K 5.4 07/05/2020     07/14/2020    CO2 15 07/14/2020    BUN 31 07/14/2020    CREATININE 2.4 07/14/2020    CALCIUM 8.5 07/14/2020    PHOS 3.1 07/13/2020         Significant Diagnostic Studies    Radiology:   CT CHEST ABDOMEN PELVIS WO CONTRAST   Final Result   Addendum 1 of 1   ADDENDUM:   Also in impression: Right adrenal nodule is slightly larger as compared to   prior exam, indeterminate by CT criteria. Further evaluation could    include   adrenal protocol CT or MR in the nonacute setting. Final      XR CHEST PORTABLE   Final Result   No acute findings         US RENAL COMPLETE   Final Result   Kidneys are bilaterally small. No gross hydronephrosis. 2.2 cm complex appearing cyst at the lower pole of the right kidney. XR CHEST PORTABLE   Final Result   1. No acute airspace consolidation. 2. Increased right paratracheal opacity, and enlargement of the right hilar   shadow, both possibly representing normal vascular anatomy. However,   lymphadenopathy or mass lesions in these locations are less likely diagnostic   considerations, and recommend further characterization with a dedicated chest   CT with contrast.                Consults:     IP CONSULT TO HOSPITALIST  IP CONSULT TO NEPHROLOGY  IP CONSULT TO CRITICAL CARE  IP CONSULT TO GI  IP CONSULT TO INFECTIOUS DISEASES  IP CONSULT TO ONCOLOGY  PHARMACY TO DOSE MEDICATION    Disposition:  SNF     Condition at Discharge: Stable    Discharge Instructions/Follow-up:  BMP in 1 week- fax it to dr Lionel bhat's office(nephrology)     use SSI only for now.  F/u BS closely and pt may need to be started back on lantus nightly dose    Code Status:  DNR-CC     Activity: activity as tolerated    Diet: diabetic diet      Discharge Medications:     Current Discharge Medication List           Details   ciprofloxacin (CIPRO) 500 MG tablet Take 1 tablet by mouth every 24 hours for 3 days  Qty: 3 tablet, Refills: 0      lactobacillus (CULTURELLE) capsule Take 1 capsule by mouth 2 times daily (with meals)  Qty: 30 capsule, Refills: 0      pantoprazole (PROTONIX) 40 MG tablet 1 tab two times daily for 8 weeks and then 1 tab daily  Qty: 180 tablet, Refills: 1      insulin lispro, 1 Unit Dial, 100 UNIT/ML SOPN Inject 0-12 Units into the skin 4 times daily (before meals and nightly) **NPO Medium Dose Correction Algorithm**  Glucose:  Dose:     No Insulin  140-199  2 Units  200-249  4 Units  250-299  6 Units  300-349  8 Units  350-399  10 Units  400 and above 12 Units  Qty: 1 pen, Refills: 1              Details   psyllium (HYDROCIL) 95 % PACK packet Take 1 packet by mouth daily  Qty: 56 each, Refills: 0      fentaNYL (DURAGESIC) 25 MCG/HR Place 1 patch onto the skin every 72 hours . Earliest Fill Date: 11/1/17  Qty: 10 patch, Refills: 0      ferrous sulfate 325 (65 Fe) MG tablet Take 325 mg by mouth daily (with breakfast)      memantine ER (NAMENDA XR) 14 MG CP24 extended release capsule Take 28 mg by mouth daily      oyster calcium (OYST-DICKSON) 500 MG TABS Take 500 mg by mouth daily      Cholecalciferol (VITAMIN D3) 2000 UNITS CAPS Take 1,000 Units by mouth daily      Ascorbic Acid (VITAMIN C) 250 MG tablet Take 500 mg by mouth daily      vitamin B-12 (CYANOCOBALAMIN) 100 MCG tablet Take 50 mcg by mouth daily      acetaminophen (TYLENOL) 500 MG tablet Take 500 mg by mouth 3 times daily. polyethylene glycol (GAVILAX) powder Take 17 g by mouth daily. nystatin (MYCOSTATIN) 891462 UNIT/GM powder Apply  topically 2 times daily. Under bilateral breasts with peleverus spray      bisacodyl (BISAC-EVAC) 10 MG suppository Place 10 mg rectally daily as needed for Constipation. aspirin 81 MG EC tablet Take 81 mg by mouth daily. albuterol (PROVENTIL) (2.5 MG/3ML) 0.083% nebulizer solution Take 2.5 mg by nebulization every 4 hours as needed for Wheezing.       guaiFENesin (ROBITUSSIN) 100 MG/5ML liquid Take 200 mg by mouth every 4 hours as needed for Cough. polyvinyl alcohol (LIQUIFILM TEARS) 1.4 % ophthalmic solution Place 2 drops into the right eye 2 times daily. senna-docusate (PERICOLACE) 8.6-50 MG per tablet Take 2 tablets by mouth daily. donepezil (ARICEPT) 10 MG tablet Take 10 mg by mouth nightly. metoprolol (LOPRESSOR) 25 MG tablet Take 25 mg by mouth 2 times daily. Hold for bp less than 110/60      TRAZODONE HCL Take 25 mg by mouth nightly       atorvastatin (LIPITOR) 40 MG tablet Take 40 mg by mouth daily. DULoxetine (CYMBALTA) 30 MG capsule Take 30 mg by mouth daily. Time Spent on discharge is more than 30 minutes in the examination, evaluation, counseling and review of medications and discharge plan. Signed:    Lanre Austin MD   7/14/2020      Thank you Edward Loredo MD for the opportunity to be involved in this patient's care. If you have any questions or concerns please feel free to contact me at [phone].

## 2020-07-14 NOTE — PROGRESS NOTES
Speech Language Pathology  Dysphagia Treatment Note    Name:  Fer Romeo  :   1933  Medical Diagnosis:  Acute kidney injury ([facility] [N17.9]  Acute kidney injury ([facility] [N17.9]  DKA, type 1, not at goal [facility]) [E10.10]  Treatment Diagnosis: Oropharyngeal Dysphagia  Pain level:  Pt did not report pain    Current Diet Level:   1) Dysphagia II Minced and Moist with NECTAR thick liquids/no straws/meds (crushed) with applesauce  2)Allow po only when Pt demonstrates sustained alertness and as tolerated    Assessment of Texture Tolerance:  -Impressions: Pt sleeping upon my arrival but was easily awakened with repositioning and verbal prompts. Po trials given to assess for texture tolerance. Pt demonstrates good bolus control and A-P propulsion with nectar thick liquids, purees and very soft solids. Rapid and premature bolus loss to pharynx noted with thin liquids and prolonged mastication with delayed but improved oral clearing noted with soft solids. Intermittent wheezing over successive po trials continues to be noted    Diet and Treatment Recommendations:  1) Dysphagia II Minced and Moist with NECTAR thick liquids/no straws/meds (crushed) with applesauce  2)Allow po only when Pt demonstrates sustained alertness and as tolerated    Cognitive Assessment/Treatment Goals:  Impressions: Pt intermittent verbally responsive to direct questions. Pt demonstrates improved \"purposeful\" gaze throughout questioning which appears improved. Pt oriented to hospital as well as month and year with prompts. Pt unable to recall recent events or items from meal tray. However, Pt was able to recall 2/4 orientation questions given a 10 min delay. Goals:  1)Pt will improve orientation to x4, for improved awareness of surroundings and reduced confusion. (ongoing 2020)   2)Pt will improve short term recall of daily events and newly learned information via graded tasks, to 50%, for improved safety with dysphagia recommendations. (ongoing 7/14/2020)     Plan:  Continued daily Dysphagia treatment with goals per plan of care. Patient/Family Education:Education given to the Pt and nurse, who verbalized understanding    Discharge Recommendations:  Pt will benefit from continued skilled Speech Therapy for Dysphagia services, prior to returning home. Timed Code Treatment:  10 min    Total Treatment Time:  25 min    If patient discharges prior to next session this note will serve as a discharge summary.      Dejuan Pardo [id number]   Speech-Language Pathologist

## 2020-07-15 VITALS
HEIGHT: 65 IN | DIASTOLIC BLOOD PRESSURE: 55 MMHG | SYSTOLIC BLOOD PRESSURE: 155 MMHG | TEMPERATURE: 97.7 F | OXYGEN SATURATION: 100 % | RESPIRATION RATE: 16 BRPM | BODY MASS INDEX: 28.54 KG/M2 | HEART RATE: 82 BPM | WEIGHT: 171.3 LBS

## 2020-07-15 LAB
ANION GAP SERPL CALCULATED.3IONS-SCNC: 11 MMOL/L (ref 3–16)
BASOPHILS ABSOLUTE: 0 K/UL (ref 0–0.2)
BASOPHILS RELATIVE PERCENT: 0.3 %
BUN BLDV-MCNC: 25 MG/DL (ref 7–20)
CALCIUM SERPL-MCNC: 8.4 MG/DL (ref 8.3–10.6)
CHLORIDE BLD-SCNC: 111 MMOL/L (ref 99–110)
CO2: 16 MMOL/L (ref 21–32)
CREAT SERPL-MCNC: 2.1 MG/DL (ref 0.6–1.2)
EOSINOPHILS ABSOLUTE: 0.2 K/UL (ref 0–0.6)
EOSINOPHILS RELATIVE PERCENT: 1.2 %
GFR AFRICAN AMERICAN: 27
GFR NON-AFRICAN AMERICAN: 22
GLUCOSE BLD-MCNC: 104 MG/DL (ref 70–99)
GLUCOSE BLD-MCNC: 107 MG/DL (ref 70–99)
GLUCOSE BLD-MCNC: 153 MG/DL (ref 70–99)
GLUCOSE BLD-MCNC: 192 MG/DL (ref 70–99)
HCT VFR BLD CALC: 23.9 % (ref 36–48)
HCT VFR BLD CALC: 25.2 % (ref 36–48)
HEMOGLOBIN: 7.6 G/DL (ref 12–16)
HEMOGLOBIN: 8 G/DL (ref 12–16)
LYMPHOCYTES ABSOLUTE: 1.2 K/UL (ref 1–5.1)
LYMPHOCYTES RELATIVE PERCENT: 8.8 %
MAGNESIUM: 1.4 MG/DL (ref 1.8–2.4)
MCH RBC QN AUTO: 29.3 PG (ref 26–34)
MCHC RBC AUTO-ENTMCNC: 31.6 G/DL (ref 31–36)
MCV RBC AUTO: 92.8 FL (ref 80–100)
MONOCYTES ABSOLUTE: 0.5 K/UL (ref 0–1.3)
MONOCYTES RELATIVE PERCENT: 3.8 %
NEUTROPHILS ABSOLUTE: 11.4 K/UL (ref 1.7–7.7)
NEUTROPHILS RELATIVE PERCENT: 85.9 %
PDW BLD-RTO: 17.7 % (ref 12.4–15.4)
PERFORMED ON: ABNORMAL
PLATELET # BLD: 221 K/UL (ref 135–450)
PMV BLD AUTO: 8.7 FL (ref 5–10.5)
POTASSIUM SERPL-SCNC: 3.2 MMOL/L (ref 3.5–5.1)
RBC # BLD: 2.71 M/UL (ref 4–5.2)
REPORT: NORMAL
SARS-COV-2: NOT DETECTED
SODIUM BLD-SCNC: 138 MMOL/L (ref 136–145)
THIS TEST SENT TO: NORMAL
WBC # BLD: 13.2 K/UL (ref 4–11)

## 2020-07-15 PROCEDURE — 85025 COMPLETE CBC W/AUTO DIFF WBC: CPT

## 2020-07-15 PROCEDURE — 6370000000 HC RX 637 (ALT 250 FOR IP): Performed by: INTERNAL MEDICINE

## 2020-07-15 PROCEDURE — 85014 HEMATOCRIT: CPT

## 2020-07-15 PROCEDURE — 6360000002 HC RX W HCPCS: Performed by: INTERNAL MEDICINE

## 2020-07-15 PROCEDURE — 94760 N-INVAS EAR/PLS OXIMETRY 1: CPT

## 2020-07-15 PROCEDURE — 2580000003 HC RX 258: Performed by: HOSPITALIST

## 2020-07-15 PROCEDURE — 6360000002 HC RX W HCPCS: Performed by: PHYSICIAN ASSISTANT

## 2020-07-15 PROCEDURE — 83735 ASSAY OF MAGNESIUM: CPT

## 2020-07-15 PROCEDURE — 36415 COLL VENOUS BLD VENIPUNCTURE: CPT

## 2020-07-15 PROCEDURE — 85018 HEMOGLOBIN: CPT

## 2020-07-15 PROCEDURE — 80048 BASIC METABOLIC PNL TOTAL CA: CPT

## 2020-07-15 PROCEDURE — 2580000003 HC RX 258: Performed by: INTERNAL MEDICINE

## 2020-07-15 PROCEDURE — C9113 INJ PANTOPRAZOLE SODIUM, VIA: HCPCS | Performed by: PHYSICIAN ASSISTANT

## 2020-07-15 RX ORDER — MAGNESIUM SULFATE IN WATER 40 MG/ML
2 INJECTION, SOLUTION INTRAVENOUS ONCE
Status: COMPLETED | OUTPATIENT
Start: 2020-07-15 | End: 2020-07-15

## 2020-07-15 RX ORDER — POTASSIUM CHLORIDE 20 MEQ/1
20 TABLET, EXTENDED RELEASE ORAL ONCE
Status: COMPLETED | OUTPATIENT
Start: 2020-07-15 | End: 2020-07-15

## 2020-07-15 RX ADMIN — INSULIN LISPRO 2 UNITS: 100 INJECTION, SOLUTION INTRAVENOUS; SUBCUTANEOUS at 17:14

## 2020-07-15 RX ADMIN — ASPIRIN 81 MG: 81 TABLET, COATED ORAL at 10:23

## 2020-07-15 RX ADMIN — METOPROLOL TARTRATE 25 MG: 25 TABLET, FILM COATED ORAL at 10:23

## 2020-07-15 RX ADMIN — MAGNESIUM SULFATE HEPTAHYDRATE 2 G: 40 INJECTION, SOLUTION INTRAVENOUS at 16:19

## 2020-07-15 RX ADMIN — CALCIUM 500 MG: 500 TABLET ORAL at 10:23

## 2020-07-15 RX ADMIN — VITAMIN D, TAB 1000IU (100/BT) 1000 UNITS: 25 TAB at 10:23

## 2020-07-15 RX ADMIN — DESMOPRESSIN ACETATE 40 MG: 0.2 TABLET ORAL at 10:23

## 2020-07-15 RX ADMIN — Medication 1 CAPSULE: at 10:23

## 2020-07-15 RX ADMIN — CIPROFLOXACIN HYDROCHLORIDE 500 MG: 500 TABLET, FILM COATED ORAL at 10:22

## 2020-07-15 RX ADMIN — PANTOPRAZOLE SODIUM 40 MG: 40 INJECTION, POWDER, FOR SOLUTION INTRAVENOUS at 10:22

## 2020-07-15 RX ADMIN — POTASSIUM CHLORIDE 20 MEQ: 1500 TABLET, EXTENDED RELEASE ORAL at 10:48

## 2020-07-15 RX ADMIN — INSULIN LISPRO 2 UNITS: 100 INJECTION, SOLUTION INTRAVENOUS; SUBCUTANEOUS at 12:11

## 2020-07-15 RX ADMIN — Medication 10 ML: at 10:24

## 2020-07-15 RX ADMIN — Medication 10 ML: at 10:22

## 2020-07-15 ASSESSMENT — PAIN SCALES - GENERAL
PAINLEVEL_OUTOF10: 0

## 2020-07-15 NOTE — PROGRESS NOTES
Handoff complete. Pt resting in bed eyes open, introduced self to pt. Pt oriented to self and place. Follows commands. Denies pain or SOB. On room air. Lungs clear. Pt drank small cup nectar thick water. , warm blankets placed. Pt denies further needs. Reinforced call light. Safety precautions in place.

## 2020-07-15 NOTE — PROGRESS NOTES
from medical records     Interval hx     BUN and creatinine level much better   UOP improved   Oral intake better   Sodium level better       I/O last 3 completed shifts: In: 240 [P.O.:240]  Out: 725 [Urine:725]  I/O this shift:  In: 150 [P.O.:120;  I.V.:30]  Out: -         Past Medical History:   Diagnosis Date    Arthritis     Chronic kidney disease     Constipation     Dementia ([facility]     Depression     Diabetes mellitus (HCC)     GERD (gastroesophageal reflux disease)     Glaucoma     Hyperlipidemia     Hypertension     MI (myocardial infarction) ([facility]     Muscle weakness (generalized)     Unspecified cerebral artery occlusion with cerebral infarction     UTI (lower urinary tract infection)        Past Surgical History:   Procedure Laterality Date    APPENDECTOMY      BREAST SURGERY      CHOLECYSTECTOMY      COLONOSCOPY      DILATATION, ESOPHAGUS      ENDOSCOPY, COLON, DIAGNOSTIC      EYE SURGERY      HYSTERECTOMY      JOINT REPLACEMENT      SIGMOIDOSCOPY  3/18/15    TONSILLECTOMY      UPPER GASTROINTESTINAL ENDOSCOPY  3/18/15    UPPER GASTROINTESTINAL ENDOSCOPY N/A 7/8/2020    EGD BIOPSY performed by Kb Samaniego MD at [address] [place] [address]         Current Medications:    insulin lispro (1 Unit Dial) 0-12 Units, 4x Daily AC & HS  ciprofloxacin (CIPRO) tablet 500 mg, Daily  lactobacillus (CULTURELLE) capsule 1 capsule, BID WC  pantoprazole (PROTONIX) injection 40 mg, BID  aspirin EC tablet 81 mg, Daily  atorvastatin (LIPITOR) tablet 40 mg, Daily  bisacodyl (DULCOLAX) suppository 10 mg, Daily PRN  Vitamin D (CHOLECALCIFEROL) tablet 1,000 Units, Daily  [Held by provider] donepezil (ARICEPT) tablet 10 mg, Nightly  [Held by provider] DULoxetine (CYMBALTA) extended release capsule 30 mg, Daily  metoprolol tartrate (LOPRESSOR) tablet 25 mg, BID  calcium elemental (OSCAL) tablet 500 mg, Daily  vitamin B-12 (CYANOCOBALAMIN) tablet 50 mcg, Daily  albuterol sulfate  (90 Base) MCG/ACT inhaler 2 PHOS 3.1  --   --      Hepatic:   No results for input(s): AST, ALT, ALB, BILITOT, ALKPHOS in the last 72 hours. Troponin:   No results for input(s): TROPONINI in the last 72 hours. BNP: No results for input(s): BNP in the last 72 hours. Lipids:   No results for input(s): CHOL, TRIG, HDL, LDLCALC, LABVLDL in the last 72 hours. ABGs: No results for input(s): PHART, PO2ART, [id number] in the last 72 hours. INR:   No results for input(s): INR in the last 72 hours. UA:  No results for input(s): Ivy Slovak, GLUCOSEU, BILIRUBINUR, KETUA, SPECGRAV, BLOODU, PHUR, PROTEINU, UROBILINOGEN, NITRU, LEUKOCYTESUR, Truett Jeremías in the last 72 hours. Urine Microscopic:   No results for input(s): LABCAST, BACTERIA, COMU, HYALCAST, WBCUA, RBCUA, EPIU in the last 72 hours. Urine Culture:   No results for input(s): LABURIN in the last 72 hours. Urine Chemistry:   No results for input(s): Hermes Florin, PROTEINUR, NAUR in the last 72 hours. IMAGING:  CT CHEST ABDOMEN PELVIS WO CONTRAST   Final Result   Addendum 1 of 1   ADDENDUM:   Also in impression: Right adrenal nodule is slightly larger as compared to   prior exam, indeterminate by CT criteria. Further evaluation could    include   adrenal protocol CT or MR in the nonacute setting. Final      XR CHEST PORTABLE   Final Result   No acute findings         US RENAL COMPLETE   Final Result   Kidneys are bilaterally small. No gross hydronephrosis. 2.2 cm complex appearing cyst at the lower pole of the right kidney. XR CHEST PORTABLE   Final Result   1. No acute airspace consolidation. 2. Increased right paratracheal opacity, and enlargement of the right hilar   shadow, both possibly representing normal vascular anatomy.   However,   lymphadenopathy or mass lesions in these locations are less likely diagnostic   considerations, and recommend further characterization with a dedicated chest   CT with contrast.             I/O last 3 completed shifts: In: 240 [P.O.:240]  Out: 725 [Urine:725]      Assessment/Plan :      1. CYNTIHA. pre renal   Hold losartan, metformin   CYNTHIA improving slowly   UOP is improving   Recommend to dose adjust all medications  based on renal functions  Maintain SBP> 90 mmHg   Daily weights   AVOID NSAIDs  Avoid Nephrotoxins  Monitor Intake/Output      2. HTN. Controlled BP     3. High anion gap acidosis 2/2 CYNTHIA   Improved   Hold metformin     4. AMS 2/2 to metabolic encephalopathy   namenda , aricept , cymbalata was held  Can be resumed . Will defer to primary attending     5. Hypernatremia - 2/2 hypovolemia. Improved   \    6.  Hypokalemia - supplement po                   Thank you for allowing us to participate in care of Kinza Sandoval         Electronically signed by: Beatriz Harrell MD, 7/15/2020, 1:23 PM      [facility] [address]  Office : [phone]  Fax :[phone]

## 2020-07-15 NOTE — CARE COORDINATION
CM initiated a tentative transport with [facility] for 1800 this evening pending the result of the Covid testing. Voice message left for Candida/ admissions to notify of transport time. CM spoke with patient's sister to advise of transport.     BRAULIO MoreN, CCM, RN  [facility]  [phone]

## 2020-07-15 NOTE — PROGRESS NOTES
Assessment completed, see doc flowsheets. Pt is alert, awake and orient to self only. Lung sounds are diminished with rhonchi in right upper lobe. VSS. Call light with in reach, will continue to monitor.

## 2020-07-15 NOTE — CARE COORDINATION
Discharge Plan:     Patient discharged [facility] Del[facility]  Phone: [phone]  Fax: [phone]  SW/DC Planner faxed, [phone] and AVS   Narcotic Prescriptions faxed were: none  RN: [phone]) will call report to:       Medical Transport with: [facility]  [phone]   time:1800  Family advised of discharge?:yes  HENS Submitted?:  Returns to LTC, not needed  All discharge needs met per case management.   BRAULIO KiserN, CCM, RN  [facility]  [phone]

## (undated) DEVICE — FORCEPS BX L240CM WRK CHN 2.8MM STD CAP W/ NDL MIC MESH

## (undated) DEVICE — BW-412T DISP COMBO CLEANING BRUSH: Brand: SINGLE USE COMBINATION CLEANING BRUSH

## (undated) DEVICE — MOUTHPIECE ENDOSCP L CTRL OPN AND SIDE PORTS DISP

## (undated) DEVICE — SET VLV 3 PC AWS DISPOSABLE GRDIAN SCOPEVALET

## (undated) DEVICE — GOWN AURORA NONREINF LG: Brand: MEDLINE INDUSTRIES, INC.

## (undated) DEVICE — SOLUTION IV IRRIG WATER 500ML POUR BRL ST 2F7113

## (undated) DEVICE — PROCEDURE KIT ENDOSCP CUST